# Patient Record
Sex: FEMALE | Race: WHITE | NOT HISPANIC OR LATINO | Employment: FULL TIME | URBAN - METROPOLITAN AREA
[De-identification: names, ages, dates, MRNs, and addresses within clinical notes are randomized per-mention and may not be internally consistent; named-entity substitution may affect disease eponyms.]

---

## 2017-03-17 ENCOUNTER — TRANSCRIBE ORDERS (OUTPATIENT)
Dept: LAB | Facility: HOSPITAL | Age: 32
End: 2017-03-17

## 2017-03-17 ENCOUNTER — APPOINTMENT (OUTPATIENT)
Dept: LAB | Facility: HOSPITAL | Age: 32
End: 2017-03-17
Attending: SPECIALIST

## 2017-03-17 DIAGNOSIS — Z32.00 PREGNANCY EXAMINATION OR TEST, PREGNANCY UNCONFIRMED: Primary | ICD-10-CM

## 2017-03-17 LAB — HCG INTACT+B SERPL-ACNC: 124 MIU/ML

## 2017-03-17 PROCEDURE — 36415 COLL VENOUS BLD VENIPUNCTURE: CPT | Performed by: SPECIALIST

## 2017-03-17 PROCEDURE — 84702 CHORIONIC GONADOTROPIN TEST: CPT | Performed by: SPECIALIST

## 2017-03-24 ENCOUNTER — APPOINTMENT (OUTPATIENT)
Dept: LAB | Facility: HOSPITAL | Age: 32
End: 2017-03-24

## 2017-03-24 ENCOUNTER — TRANSCRIBE ORDERS (OUTPATIENT)
Dept: LAB | Facility: HOSPITAL | Age: 32
End: 2017-03-24

## 2017-03-24 DIAGNOSIS — E07.9 THYROID DISORDER: Primary | ICD-10-CM

## 2017-03-24 LAB — TSH SERPL DL<=0.05 MIU/L-ACNC: 1.14 MIU/ML (ref 0.35–5.35)

## 2017-03-24 PROCEDURE — 36415 COLL VENOUS BLD VENIPUNCTURE: CPT

## 2017-03-24 PROCEDURE — 84443 ASSAY THYROID STIM HORMONE: CPT | Performed by: ORTHOPAEDIC SURGERY

## 2017-04-27 ENCOUNTER — LAB (OUTPATIENT)
Dept: LAB | Facility: HOSPITAL | Age: 32
End: 2017-04-27

## 2017-04-27 DIAGNOSIS — O02.1 MISSED AB: Primary | ICD-10-CM

## 2017-04-27 LAB — HCG INTACT+B SERPL-ACNC: 382 MIU/ML

## 2017-04-27 PROCEDURE — 84702 CHORIONIC GONADOTROPIN TEST: CPT | Performed by: SPECIALIST

## 2017-04-27 PROCEDURE — 36415 COLL VENOUS BLD VENIPUNCTURE: CPT

## 2017-05-19 ENCOUNTER — TRANSCRIBE ORDERS (OUTPATIENT)
Dept: LAB | Facility: HOSPITAL | Age: 32
End: 2017-05-19

## 2017-05-19 ENCOUNTER — LAB (OUTPATIENT)
Dept: LAB | Facility: HOSPITAL | Age: 32
End: 2017-05-19

## 2017-05-19 DIAGNOSIS — O02.1 MISSED AB: Primary | ICD-10-CM

## 2017-05-19 DIAGNOSIS — O02.1 MISSED AB: ICD-10-CM

## 2017-05-19 LAB — HCG INTACT+B SERPL-ACNC: <5 MIU/ML

## 2017-05-19 PROCEDURE — 84702 CHORIONIC GONADOTROPIN TEST: CPT | Performed by: SPECIALIST

## 2017-05-19 PROCEDURE — 36415 COLL VENOUS BLD VENIPUNCTURE: CPT

## 2017-10-16 ENCOUNTER — TRANSCRIBE ORDERS (OUTPATIENT)
Dept: ADMINISTRATIVE | Facility: HOSPITAL | Age: 32
End: 2017-10-16

## 2017-10-16 DIAGNOSIS — Z31.41 FERTILITY TESTING: Primary | ICD-10-CM

## 2017-10-20 ENCOUNTER — HOSPITAL ENCOUNTER (OUTPATIENT)
Dept: GENERAL RADIOLOGY | Facility: HOSPITAL | Age: 32
Discharge: HOME | End: 2017-10-20
Attending: SPECIALIST | Admitting: SPECIALIST

## 2017-10-20 ENCOUNTER — TRANSCRIBE ORDERS (OUTPATIENT)
Dept: LAB | Facility: HOSPITAL | Age: 32
End: 2017-10-20

## 2017-10-20 ENCOUNTER — LAB (OUTPATIENT)
Dept: LAB | Facility: HOSPITAL | Age: 32
End: 2017-10-20

## 2017-10-20 DIAGNOSIS — E07.9 THYROID DISORDER: Primary | ICD-10-CM

## 2017-10-20 DIAGNOSIS — Z31.41 FERTILITY TESTING: ICD-10-CM

## 2017-10-20 DIAGNOSIS — N92.6 IRREGULAR PERIODS: ICD-10-CM

## 2017-10-20 DIAGNOSIS — E07.9 THYROID DISORDER: ICD-10-CM

## 2017-10-20 LAB — TSH SERPL DL<=0.05 MIU/L-ACNC: 0.01 MIU/ML (ref 0.35–5.35)

## 2017-10-20 PROCEDURE — 0 IOPAMIDOL 61 % SOLUTION: Performed by: SPECIALIST

## 2017-10-20 PROCEDURE — 36415 COLL VENOUS BLD VENIPUNCTURE: CPT

## 2017-10-20 PROCEDURE — 82397 CHEMILUMINESCENT ASSAY: CPT | Performed by: SPECIALIST

## 2017-10-20 PROCEDURE — 74740 X-RAY FEMALE GENITAL TRACT: CPT

## 2017-10-20 PROCEDURE — 84443 ASSAY THYROID STIM HORMONE: CPT | Performed by: SPECIALIST

## 2017-10-20 RX ADMIN — IOPAMIDOL 15 ML: 612 INJECTION, SOLUTION INTRAVENOUS at 08:16

## 2017-10-23 LAB — MIS SERPL-MCNC: 3.9 NG/ML

## 2018-08-16 ENCOUNTER — LAB REQUISITION (OUTPATIENT)
Dept: LAB | Facility: HOSPITAL | Age: 33
End: 2018-08-16

## 2018-08-16 DIAGNOSIS — Z00.00 ROUTINE GENERAL MEDICAL EXAMINATION AT A HEALTH CARE FACILITY: ICD-10-CM

## 2018-08-16 LAB — HCG INTACT+B SERPL-ACNC: 16 MIU/ML

## 2018-08-16 PROCEDURE — 84702 CHORIONIC GONADOTROPIN TEST: CPT | Performed by: OBSTETRICS & GYNECOLOGY

## 2018-08-18 ENCOUNTER — LAB REQUISITION (OUTPATIENT)
Dept: LAB | Facility: HOSPITAL | Age: 33
End: 2018-08-18

## 2018-08-18 DIAGNOSIS — Z00.00 ROUTINE GENERAL MEDICAL EXAMINATION AT A HEALTH CARE FACILITY: ICD-10-CM

## 2018-08-18 DIAGNOSIS — Z32.00 ENCOUNTER FOR PREGNANCY TEST: ICD-10-CM

## 2018-08-18 LAB — HCG INTACT+B SERPL-ACNC: 23 MIU/ML

## 2018-08-18 PROCEDURE — 84702 CHORIONIC GONADOTROPIN TEST: CPT

## 2018-08-23 ENCOUNTER — LAB REQUISITION (OUTPATIENT)
Dept: LAB | Facility: HOSPITAL | Age: 33
End: 2018-08-23

## 2018-08-23 DIAGNOSIS — Z00.00 ROUTINE GENERAL MEDICAL EXAMINATION AT A HEALTH CARE FACILITY: ICD-10-CM

## 2018-08-23 LAB — HCG INTACT+B SERPL-ACNC: 136 MIU/ML

## 2018-08-23 PROCEDURE — 84702 CHORIONIC GONADOTROPIN TEST: CPT

## 2018-09-10 ENCOUNTER — LAB REQUISITION (OUTPATIENT)
Dept: LAB | Facility: HOSPITAL | Age: 33
End: 2018-09-10

## 2018-09-10 DIAGNOSIS — Z00.00 ROUTINE GENERAL MEDICAL EXAMINATION AT A HEALTH CARE FACILITY: ICD-10-CM

## 2018-09-10 PROCEDURE — 36415 COLL VENOUS BLD VENIPUNCTURE: CPT | Performed by: NURSE PRACTITIONER

## 2018-09-13 ENCOUNTER — LAB REQUISITION (OUTPATIENT)
Dept: LAB | Facility: HOSPITAL | Age: 33
End: 2018-09-13

## 2018-09-13 DIAGNOSIS — Z00.00 ROUTINE GENERAL MEDICAL EXAMINATION AT A HEALTH CARE FACILITY: ICD-10-CM

## 2018-09-13 LAB
ALBUMIN SERPL-MCNC: 4.31 G/DL (ref 3.2–4.8)
ALBUMIN/GLOB SERPL: 2 G/DL (ref 1.5–2.5)
ALP SERPL-CCNC: 101 U/L (ref 25–100)
ALT SERPL W P-5'-P-CCNC: 108 U/L (ref 7–40)
ANION GAP SERPL CALCULATED.3IONS-SCNC: 6 MMOL/L (ref 3–11)
AST SERPL-CCNC: 51 U/L (ref 0–33)
BILIRUB SERPL-MCNC: 0.4 MG/DL (ref 0.3–1.2)
BUN BLD-MCNC: 6 MG/DL (ref 9–23)
BUN/CREAT SERPL: 8.5 (ref 7–25)
CALCIUM SPEC-SCNC: 10 MG/DL (ref 8.7–10.4)
CHLORIDE SERPL-SCNC: 101 MMOL/L (ref 99–109)
CO2 SERPL-SCNC: 28 MMOL/L (ref 20–31)
CREAT BLD-MCNC: 0.71 MG/DL (ref 0.6–1.3)
GFR SERPL CREATININE-BSD FRML MDRD: 94 ML/MIN/1.73
GLOBULIN UR ELPH-MCNC: 2.2 GM/DL
GLUCOSE BLD-MCNC: 103 MG/DL (ref 70–100)
HCG INTACT+B SERPL-ACNC: 7988 MIU/ML
POTASSIUM BLD-SCNC: 4.4 MMOL/L (ref 3.5–5.5)
PROT SERPL-MCNC: 6.5 G/DL (ref 5.7–8.2)
SODIUM BLD-SCNC: 135 MMOL/L (ref 132–146)

## 2018-09-13 PROCEDURE — 80053 COMPREHEN METABOLIC PANEL: CPT | Performed by: NURSE PRACTITIONER

## 2018-09-13 PROCEDURE — 36415 COLL VENOUS BLD VENIPUNCTURE: CPT | Performed by: NURSE PRACTITIONER

## 2018-09-13 PROCEDURE — 84702 CHORIONIC GONADOTROPIN TEST: CPT | Performed by: NURSE PRACTITIONER

## 2018-09-20 ENCOUNTER — LAB REQUISITION (OUTPATIENT)
Dept: LAB | Facility: HOSPITAL | Age: 33
End: 2018-09-20

## 2018-09-20 DIAGNOSIS — Z00.00 ROUTINE GENERAL MEDICAL EXAMINATION AT A HEALTH CARE FACILITY: ICD-10-CM

## 2018-09-20 PROCEDURE — 36415 COLL VENOUS BLD VENIPUNCTURE: CPT | Performed by: NURSE PRACTITIONER

## 2018-09-27 ENCOUNTER — LAB REQUISITION (OUTPATIENT)
Dept: LAB | Facility: HOSPITAL | Age: 33
End: 2018-09-27

## 2018-09-27 DIAGNOSIS — Z00.00 ROUTINE GENERAL MEDICAL EXAMINATION AT A HEALTH CARE FACILITY: ICD-10-CM

## 2018-09-27 PROCEDURE — 36415 COLL VENOUS BLD VENIPUNCTURE: CPT | Performed by: NURSE PRACTITIONER

## 2018-10-09 ENCOUNTER — LAB REQUISITION (OUTPATIENT)
Dept: LAB | Facility: HOSPITAL | Age: 33
End: 2018-10-09

## 2018-10-09 DIAGNOSIS — Z00.00 ROUTINE GENERAL MEDICAL EXAMINATION AT A HEALTH CARE FACILITY: ICD-10-CM

## 2018-10-09 PROCEDURE — 36415 COLL VENOUS BLD VENIPUNCTURE: CPT | Performed by: NURSE PRACTITIONER

## 2018-10-15 ENCOUNTER — OFFICE VISIT (OUTPATIENT)
Dept: ORTHOPEDIC SURGERY | Facility: CLINIC | Age: 33
End: 2018-10-15

## 2018-10-15 VITALS — WEIGHT: 182.54 LBS | OXYGEN SATURATION: 98 % | HEIGHT: 66 IN | BODY MASS INDEX: 29.34 KG/M2 | HEART RATE: 103 BPM

## 2018-10-15 DIAGNOSIS — M79.672 FOOT PAIN, LEFT: Primary | ICD-10-CM

## 2018-10-15 DIAGNOSIS — S96.911A RIGHT FOOT STRAIN, INITIAL ENCOUNTER: ICD-10-CM

## 2018-10-15 PROCEDURE — 99203 OFFICE O/P NEW LOW 30 MIN: CPT | Performed by: PHYSICIAN ASSISTANT

## 2018-10-15 RX ORDER — LEVOTHYROXINE SODIUM 0.12 MG/1
68.5 TABLET ORAL
COMMUNITY
Start: 2018-09-25 | End: 2021-07-22

## 2018-10-15 RX ORDER — LAMOTRIGINE 100 MG/1
150 TABLET ORAL DAILY
COMMUNITY
Start: 2018-09-25

## 2018-10-15 NOTE — PROGRESS NOTES
[facility] [place] [facility] Note    Subjective     Pain of the Left Foot      HPI    Bianca Mercado is a 33 y.o. female.  Presents to orthopedic clinic for evaluation of left foot pain.  She states onset of pain 8-10 weeks ago.  No history of injury or trauma.  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  Pain is localized to the dorsum of foot that extends from the fourth digit into the dorsal foot.  No redness or warmth but she does note occasional swelling.  She was sick for a period 2 weeks at which time she was offered feet symptoms improved.  When she returned to weightbearing activities pain worsened.  No radiculopathy or paresthesias.  She reports current pain scale 4/10.  Severity the pain moderate.  Quality pain dull and stabbing.  Symptoms are worse with walking, stair climbing.  She states she does feel better when she is in her flip flops.  No reported fever, chills, night sweats or other constitutional symptoms.     Past Medical History:   Diagnosis Date   • Anxiety    • Hypothyroid    • Thyroid disease       Past Surgical History:   Procedure Laterality Date   • CHOLECYSTECTOMY     • KNEE SURGERY Right    • NASAL SEPTUM SURGERY     • NOSE SURGERY     • SHOULDER SURGERY Left       Family History   Problem Relation Age of Onset   • Cancer Mother      Social History     Social History   • Marital status: Single     Spouse name: N/A   • Number of children: N/A   • Years of education: N/A     Occupational History   • Not on file.     Social History Main Topics   • Smoking status: Never Smoker   • Smokeless tobacco: Never Used   • Alcohol use No   • Drug use: No   • Sexual activity: Defer     Other Topics Concern   • Not on file     Social History Narrative   • No narrative on file      No current outpatient prescriptions on file prior to visit.     No current facility-administered medications on file prior to  "visit.       Allergies   Allergen Reactions   • Sulfa Antibiotics Hives        The following portions of the patient's history were reviewed and updated as appropriate: allergies, current medications, past family history, past medical history, past social history, past surgical history and problem list.    Review of Systems   Constitutional: Negative.    HENT: Positive for congestion.    Eyes: Negative.    Respiratory: Negative.    Cardiovascular: Negative.    Gastrointestinal: Negative.    Endocrine: Negative.    Genitourinary: Negative.    Musculoskeletal: Positive for arthralgias.   Skin: Negative.    Allergic/Immunologic: Negative.    Neurological: Negative.    Hematological: Negative.    Psychiatric/Behavioral: Negative.         Objective      Physical Exam  Pulse 103   Ht 167.6 cm (66\")   Wt 82.8 kg (182 lb 8.7 oz)   SpO2 98%   BMI 29.46 kg/m²     Body mass index is 29.46 kg/m².    General  Mental Status - alert  General Appearance - cooperative, well groomed, not in acute distress  Orientation - Oriented X3  Build & Nutrition - well developed and well nourished  Posture - normal posture  Gait - normal gait     Integumentary  Global Assessment  Examination of related systems reveals - no lymphadenopathy  Ears:  No abnormality  Nose:  No mucous drainage  General Characteristics  Overall examination of the patient's skin reveals - no rashes, no evidence of scars, no suspicious lesions and no bruises.  Color - normal coloration of skin.  Vascular: Brisk capillary refill in all extremities    Ortho Exam  V:  Dorsalis Pedis:  Right: 2+; Left:2+    Posterior Tibial: Right:2+; Left:2+    Capillary Refill:  Brisk  MSK:  Hand:right handed      Tibia:  Right:  non tender and normal motor function; Left:  non tender and normal motor function      Ankle:  Right: non tender, ROM  normal and motor function  normal; Left:  non tender, ROM  normal and motor function  normal      Foot:  Right:  non tender, ROM  normal and " motor function  normal; Left:  tender from fourth and third digits extending proximally along the dorsum foot, ROM  normal and motor function  normal and Positive pain with resisted lesser toe extension.  No evidence of Ferguson's neuroma.      NEURO: Heel Walking:  Right:  normal; Left:  Painful but able to perform    Toe Walking:  Right:  normal; Left:  Painful but able to perform     [place]-Stanislaw 5.07 monofilament test: normal    Lower extremity sensation: intact     Calf Atrophy:none    Motor Function: all 5/5        Imaging/Studies  Imaging Results (last 24 hours)     Procedure Component Value Units Date/Time    XR Foot 3+ View Left [[id number]] Resulted:  10/15/18 1321     Updated:  10/15/18 1321    Narrative:       Left Foot Radiographs  Indication: left foot pain  Views: Weight-bearing AP and lateral, with oblique views of the left foot    Comparison: no prior studies available for review    Findings:  No acute or chronic bony abnormalities seen, with normal alignment.      Ordered 3 views of left foot.  Reviewed by Dr. Berry.  No acute bony abnormality fracture or dislocation.  Joint spaces are preserved.  See chart for official report.    Assessment:  1. Foot pain, left    2. Right foot strain, initial encounter        Plan:  1. Discussion was had with patient regarding exam, imaging, assessment and treatment options.    2. At this time appears as is more of a muscular strain however cannot rule out stress fracture.    3. Patient placed in a boot to give support and stability.    4. MRI will be ordered to rule out stress fracture versus inflammation/strain to the tendons versus other pathology.    5. Recommend over-the-counter medication as needed for discomfort.  6. Follow-up 3 weeks or after MRI completed to review results.  7. Question and concerns answered.    Patient was examined by Dr. Berry and he agrees with the above assessment and plan.      Medical Decision Making  Management Options :  over-the-counter medicine  Data/Risk: radiology tests    Nani Boothe PA-C  10/16/18  8:38 AM

## 2018-10-16 ENCOUNTER — LAB REQUISITION (OUTPATIENT)
Dept: LAB | Facility: HOSPITAL | Age: 33
End: 2018-10-16

## 2018-10-16 DIAGNOSIS — Z00.00 ROUTINE GENERAL MEDICAL EXAMINATION AT A HEALTH CARE FACILITY: ICD-10-CM

## 2018-10-16 PROCEDURE — 36415 COLL VENOUS BLD VENIPUNCTURE: CPT | Performed by: NURSE PRACTITIONER

## 2018-10-25 ENCOUNTER — LAB REQUISITION (OUTPATIENT)
Dept: LAB | Facility: HOSPITAL | Age: 33
End: 2018-10-25

## 2018-10-25 ENCOUNTER — HOSPITAL ENCOUNTER (OUTPATIENT)
Dept: MRI IMAGING | Facility: HOSPITAL | Age: 33
Discharge: HOME | End: 2018-10-25
Admitting: PHYSICIAN ASSISTANT

## 2018-10-25 DIAGNOSIS — Z00.00 ROUTINE GENERAL MEDICAL EXAMINATION AT A HEALTH CARE FACILITY: ICD-10-CM

## 2018-10-25 DIAGNOSIS — M79.672 FOOT PAIN, LEFT: ICD-10-CM

## 2018-10-25 DIAGNOSIS — S96.911A RIGHT FOOT STRAIN, INITIAL ENCOUNTER: ICD-10-CM

## 2018-10-25 PROCEDURE — 36415 COLL VENOUS BLD VENIPUNCTURE: CPT | Performed by: NURSE PRACTITIONER

## 2018-10-25 PROCEDURE — 73718 MRI LOWER EXTREMITY W/O DYE: CPT

## 2018-11-01 ENCOUNTER — LAB REQUISITION (OUTPATIENT)
Dept: LAB | Facility: HOSPITAL | Age: 33
End: 2018-11-01

## 2018-11-01 DIAGNOSIS — Z00.00 ROUTINE GENERAL MEDICAL EXAMINATION AT A HEALTH CARE FACILITY: ICD-10-CM

## 2018-11-01 PROCEDURE — 36415 COLL VENOUS BLD VENIPUNCTURE: CPT | Performed by: NURSE PRACTITIONER

## 2018-11-05 ENCOUNTER — OFFICE VISIT (OUTPATIENT)
Dept: ORTHOPEDIC SURGERY | Facility: CLINIC | Age: 33
End: 2018-11-05

## 2018-11-05 VITALS — HEIGHT: 66 IN | BODY MASS INDEX: 29.34 KG/M2 | HEART RATE: 110 BPM | WEIGHT: 182.54 LBS | OXYGEN SATURATION: 98 %

## 2018-11-05 DIAGNOSIS — M77.8 EXTENSOR TENDONITIS OF FOOT: ICD-10-CM

## 2018-11-05 DIAGNOSIS — M79.672 FOOT PAIN, LEFT: Primary | ICD-10-CM

## 2018-11-05 PROCEDURE — 99213 OFFICE O/P EST LOW 20 MIN: CPT | Performed by: PHYSICIAN ASSISTANT

## 2018-11-05 NOTE — PROGRESS NOTES
"    [facility] [place] Orthopa[facility] Surgery Clinic Note    Subjective     CC: Follow-up of the Left Foot (After MRI 10/25/2018)      ELADIO Mercado is a 33 y.o. female.  A she returns today for MRI results of her left foot.  No significant change since last visit.  She has been wearing the cam boot.  She still notes pain scale 2/10.  Severity the pain mild to moderate.  Quality the pain dull.  Symptoms are worse with walking, prolonged standing, stair climbing or at work where she is standing on concrete.  No reported radiculopathy or paresthesias.  Denies fever, chills, night sweats or other constitutional symptoms.       ROS:    Constiutional:Pt denies fever, chills, nausea, or vomiting.  MSK:as above    Objective      Past Medical History  Past Medical History:   Diagnosis Date   • Anxiety    • Hypothyroid    • Thyroid disease          Physical Exam  Pulse 110   Ht 167.6 cm (65.98\")   Wt 82.8 kg (182 lb 8.7 oz)   LMP  (LMP Unknown)   SpO2 98%   BMI 29.48 kg/m²     Body mass index is 29.48 kg/m².    Patient is well nourished and well developed.        Ortho Exam  V:                     Dorsalis Pedis:  Right: 2+; Left:2+                          Posterior Tibial: Right:2+; Left:2+                          Capillary Refill:  Brisk  MSK:               Hand:right handed                             Tibia:  Right:  non tender and normal motor function; Left:  non tender and normal motor function                             Ankle:  Right: non tender, ROM  normal and motor function  normal; Left:  non tender, ROM  normal and motor function  normal                             Foot:  Right:  non tender, ROM  normal and motor function  normal; Left:  tender along dorsum foot over third MT , ROM  normal and motor function  normal and Positive pain with resisted lesser toe extension.                              NEURO:         Heel Walking:  not attempted                          Toe Walking:  not attempted                  "                 [place]-Stanislaw 5.07 monofilament test: normal                          Lower extremity sensation: intact                           Calf Atrophy:none                          Motor Function: all 5/5       Imaging/Labs/EMG Reviewed:  Imaging Results (last 24 hours)     ** No results found for the last 24 hours. **      Reviewed noncontrast MRI performed on the left foot 10/25/18.  No evidence of stress fracture or marrow edema.  Evidence of tendinitis noted along third extensor tendon at level of the proximal third metatarsal which does correspond where the patient is tender.  See chart for official report.    Assessment:  1. Foot pain, left    2. Extensor tendonitis of foot        Plan:  1. Discussion was had with patient regarding exam, imaging, assessment and treatment options.    2. Continue use of CAM boot for another 3-4 weeks and then gradually transition into regular shoes.  3. Offered to send the patient physical therapy but she declined stating she's been in the past and knows which exercises to be performing.    4. Recommend over-the-counter medication as needed for discomfort.  5. Follow-up 6 weeks for repeat evaluation, sooner if issues arise or symptoms worsen/change.  6. Question and concerns answered.    Case was discussed with Dr. Berry who agreed with the above assessment and plan.      Medical Decision Making  Management Options : over-the-counter medicine  Data/Risk: radiology tests    Nani Boothe PA-C  11/06/18  10:28 AM

## 2018-11-06 ENCOUNTER — LAB REQUISITION (OUTPATIENT)
Dept: LAB | Facility: HOSPITAL | Age: 33
End: 2018-11-06

## 2018-11-06 DIAGNOSIS — Z00.00 ROUTINE GENERAL MEDICAL EXAMINATION AT A HEALTH CARE FACILITY: ICD-10-CM

## 2018-11-06 PROCEDURE — 36415 COLL VENOUS BLD VENIPUNCTURE: CPT | Performed by: NURSE PRACTITIONER

## 2018-11-21 ENCOUNTER — LAB REQUISITION (OUTPATIENT)
Dept: LAB | Facility: HOSPITAL | Age: 33
End: 2018-11-21

## 2018-11-21 DIAGNOSIS — Z00.00 ROUTINE GENERAL MEDICAL EXAMINATION AT A HEALTH CARE FACILITY: ICD-10-CM

## 2018-11-21 PROCEDURE — 36415 COLL VENOUS BLD VENIPUNCTURE: CPT | Performed by: NURSE PRACTITIONER

## 2018-12-05 ENCOUNTER — LAB REQUISITION (OUTPATIENT)
Dept: LAB | Facility: HOSPITAL | Age: 33
End: 2018-12-05

## 2018-12-05 DIAGNOSIS — Z00.00 ROUTINE GENERAL MEDICAL EXAMINATION AT A HEALTH CARE FACILITY: ICD-10-CM

## 2018-12-05 PROCEDURE — 36415 COLL VENOUS BLD VENIPUNCTURE: CPT | Performed by: NURSE PRACTITIONER

## 2018-12-17 ENCOUNTER — OFFICE VISIT (OUTPATIENT)
Dept: ORTHOPEDIC SURGERY | Facility: CLINIC | Age: 33
End: 2018-12-17

## 2018-12-17 VITALS — OXYGEN SATURATION: 99 % | BODY MASS INDEX: 30.22 KG/M2 | HEIGHT: 66 IN | HEART RATE: 106 BPM | WEIGHT: 188.05 LBS

## 2018-12-17 DIAGNOSIS — M77.8 EXTENSOR TENDONITIS OF FOOT: ICD-10-CM

## 2018-12-17 DIAGNOSIS — M79.672 FOOT PAIN, LEFT: Primary | ICD-10-CM

## 2018-12-17 PROCEDURE — 99212 OFFICE O/P EST SF 10 MIN: CPT | Performed by: PHYSICIAN ASSISTANT

## 2018-12-17 RX ORDER — OXCARBAZEPINE 300 MG/1
450 TABLET, FILM COATED ORAL NIGHTLY
COMMUNITY
Start: 2018-11-28

## 2018-12-17 RX ORDER — CLONAZEPAM 0.5 MG/1
0.5 TABLET ORAL 2 TIMES DAILY PRN
COMMUNITY
Start: 2018-11-06

## 2018-12-17 NOTE — PROGRESS NOTES
"    [facility] [place] Orthopaedic Surgery Clinic Note    Subjective     CC: Follow-up of the Left Foot (Extensor Tendonitis of Foot /6 week f/u)      HPI    Bianca Mercado is a 33 y.o. female.  Patient returns today for follow-up evaluation of her left foot.  She reports symptoms have improved.  She currently endorses 2/10 pain.  Severity the pain mild.  Quality pain dull.  Pains worse with walking, stair climbing and at work.  If she is at home doing chores she doesn't have any pain, but at work standing all day on concrete that's when she notices the pain along the dorsum of foot.  No reported radiculopathy or paresthesias.       ROS:    Constiutional:Pt denies fever, chills, nausea, or vomiting.  MSK:as above    Objective      Past Medical History  Past Medical History:   Diagnosis Date   • Anxiety    • Hypothyroid    • Thyroid disease          Physical Exam  Pulse 106   Ht 167.6 cm (65.98\")   Wt 85.3 kg (188 lb 0.8 oz)   SpO2 99%   BMI 30.37 kg/m²     Body mass index is 30.37 kg/m².    Patient is well nourished and well developed.        Ortho Exam  V:  Dorsalis Pedis:  Left:2+    Posterior Tibial: Left:2+    Capillary Refill:  Brisk  MSK:  Tibia:  Left:  non tender and normal motor function      Ankle:  Left:  non tender, ROM  normal and motor function  normal      Foot:  Left:  tender Positive dorsum of foot and along the second through fourth metatarsals, ROM  normal and motor function  normal      NEURO: Heel Walking:  Right:  normal; Left:  normal    Toe Walking:  Right:  normal; Left:  normal     [place]-Stanislaw 5.07 monofilament test: not evaluated    Lower extremity sensation: intact     Calf Atrophy:none    Motor Function: all 5/5        Imaging/Labs/EMG Reviewed:    Imaging Results (last 24 hours)     ** No results found for the last 24 hours. **      None today.    Assessment:  1. Foot pain, left    2. Extensor tendonitis of foot        Plan:  1. Discussion was had with patient regarding exam, assessment " and treatment options for her right foot pain with known extensor tendinitis.  2. Because she is starting [place] break, she knows she'll have a couple weeks off to continue to allow the foot rest.    3. We discussed the use of custom-made orthotics but because of the price she doesn't believe she'll be able to proceed with this treatment course.  Instead she'll try over-the-counter orthotics.  Regardless a prescription for custom-made orthotics was given to the patient today.  4. After she returns to work in the new year, if she has a return of the pain she'll follow back up for casting to allow complete and total rest.  Patient is amenable to this treatment plan and we'll therefore follow up after the new year as needed.       5. I did once again offered to send the patient physical therapy but she declined.    6. Recommend over-the-counter medication as needed for discomfort.  7. Follow-up as needed.  8. Question and concerns answered.    Case was discussed with Dr. Berry who agreed with the above assessment and plan.    Medical Decision Making  Management Options : over-the-counter medicine      Nani Boothe PA-C  12/18/18  8:21 AM

## 2019-02-28 ENCOUNTER — TRANSCRIBE ORDERS (OUTPATIENT)
Dept: LAB | Facility: HOSPITAL | Age: 34
End: 2019-02-28

## 2019-02-28 ENCOUNTER — LAB (OUTPATIENT)
Dept: LAB | Facility: HOSPITAL | Age: 34
End: 2019-02-28

## 2019-02-28 DIAGNOSIS — Z32.00 PREGNANCY EXAMINATION OR TEST, PREGNANCY UNCONFIRMED: ICD-10-CM

## 2019-02-28 DIAGNOSIS — Z32.00 PREGNANCY EXAMINATION OR TEST, PREGNANCY UNCONFIRMED: Primary | ICD-10-CM

## 2019-02-28 LAB — HCG INTACT+B SERPL-ACNC: <5 MIU/ML

## 2019-02-28 PROCEDURE — 84702 CHORIONIC GONADOTROPIN TEST: CPT

## 2019-02-28 PROCEDURE — 36415 COLL VENOUS BLD VENIPUNCTURE: CPT

## 2019-03-25 ENCOUNTER — LAB REQUISITION (OUTPATIENT)
Dept: LAB | Facility: HOSPITAL | Age: 34
End: 2019-03-25

## 2019-03-25 DIAGNOSIS — Z00.00 ROUTINE GENERAL MEDICAL EXAMINATION AT A HEALTH CARE FACILITY: ICD-10-CM

## 2019-03-25 LAB — HCG INTACT+B SERPL-ACNC: 184 MIU/ML

## 2019-03-25 PROCEDURE — 84702 CHORIONIC GONADOTROPIN TEST: CPT

## 2019-03-28 ENCOUNTER — LAB REQUISITION (OUTPATIENT)
Dept: LAB | Facility: HOSPITAL | Age: 34
End: 2019-03-28

## 2019-03-28 DIAGNOSIS — Z00.00 ROUTINE GENERAL MEDICAL EXAMINATION AT A HEALTH CARE FACILITY: ICD-10-CM

## 2019-03-28 LAB — HCG INTACT+B SERPL-ACNC: 724 MIU/ML

## 2019-03-28 PROCEDURE — 84702 CHORIONIC GONADOTROPIN TEST: CPT | Performed by: OBSTETRICS & GYNECOLOGY

## 2019-05-22 ENCOUNTER — LAB REQUISITION (OUTPATIENT)
Dept: LAB | Facility: HOSPITAL | Age: 34
End: 2019-05-22

## 2019-05-22 DIAGNOSIS — Z00.00 ROUTINE GENERAL MEDICAL EXAMINATION AT A HEALTH CARE FACILITY: ICD-10-CM

## 2019-05-22 PROCEDURE — 36415 COLL VENOUS BLD VENIPUNCTURE: CPT | Performed by: OBSTETRICS & GYNECOLOGY

## 2019-05-22 PROCEDURE — 36415 COLL VENOUS BLD VENIPUNCTURE: CPT

## 2019-06-12 ENCOUNTER — TRANSCRIBE ORDERS (OUTPATIENT)
Dept: WOMENS IMAGING | Facility: HOSPITAL | Age: 34
End: 2019-06-12

## 2019-06-12 DIAGNOSIS — O09.522 AMA (ADVANCED MATERNAL AGE) MULTIGRAVIDA 35+, SECOND TRIMESTER: Primary | ICD-10-CM

## 2019-07-03 ENCOUNTER — OFFICE VISIT (OUTPATIENT)
Dept: OBSTETRICS AND GYNECOLOGY | Facility: HOSPITAL | Age: 34
End: 2019-07-03

## 2019-07-03 ENCOUNTER — HOSPITAL ENCOUNTER (OUTPATIENT)
Dept: WOMENS IMAGING | Facility: HOSPITAL | Age: 34
Discharge: HOME | End: 2019-07-03
Admitting: OBSTETRICS & GYNECOLOGY

## 2019-07-03 VITALS
DIASTOLIC BLOOD PRESSURE: 78 MMHG | WEIGHT: 198 LBS | SYSTOLIC BLOOD PRESSURE: 123 MMHG | BODY MASS INDEX: 31.82 KG/M2 | HEIGHT: 66 IN

## 2019-07-03 DIAGNOSIS — O44.20 MARGINAL PLACENTA PREVIA: ICD-10-CM

## 2019-07-03 DIAGNOSIS — O09.512 ELDERLY PRIMIGRAVIDA IN SECOND TRIMESTER: Primary | ICD-10-CM

## 2019-07-03 DIAGNOSIS — O09.522 AMA (ADVANCED MATERNAL AGE) MULTIGRAVIDA 35+, SECOND TRIMESTER: ICD-10-CM

## 2019-07-03 DIAGNOSIS — Z34.90 PREGNANCY, UNSPECIFIED GESTATIONAL AGE: ICD-10-CM

## 2019-07-03 DIAGNOSIS — O35.BXX0 ECHOGENIC FOCUS OF HEART OF FETUS AFFECTING ANTEPARTUM CARE OF MOTHER, SINGLE OR UNSPECIFIED FETUS: ICD-10-CM

## 2019-07-03 PROCEDURE — 76811 OB US DETAILED SNGL FETUS: CPT | Performed by: OBSTETRICS & GYNECOLOGY

## 2019-07-03 PROCEDURE — 76811 OB US DETAILED SNGL FETUS: CPT

## 2019-07-03 RX ORDER — PRENATAL WITH FERROUS FUM AND FOLIC ACID 3080; 920; 120; 400; 22; 1.84; 3; 20; 10; 1; 12; 200; 27; 25; 2 [IU]/1; [IU]/1; MG/1; [IU]/1; MG/1; MG/1; MG/1; MG/1; MG/1; MG/1; UG/1; MG/1; MG/1; MG/1; MG/1
TABLET ORAL DAILY
COMMUNITY
End: 2021-07-22

## 2019-07-03 RX ORDER — LEVOMEFOLATE CALCIUM 15 MG
5 TABLET ORAL 3 TIMES WEEKLY
COMMUNITY
End: 2019-10-11 | Stop reason: HOSPADM

## 2019-07-03 NOTE — PROGRESS NOTES
Documentation of the ultasound findings, images, and interpretations with be available in the patient's Viewpoint report located in the Chart Review Imaging tab in [facility].

## 2019-07-03 NOTE — PROGRESS NOTES
"Pt denies problems with pregnancy.  Next OB appt 7/17/19.  Pt reports \"DS test was normal and a girl.\"  "

## 2019-08-09 ENCOUNTER — LAB REQUISITION (OUTPATIENT)
Dept: LAB | Facility: HOSPITAL | Age: 34
End: 2019-08-09

## 2019-08-09 DIAGNOSIS — Z00.00 ROUTINE GENERAL MEDICAL EXAMINATION AT A HEALTH CARE FACILITY: ICD-10-CM

## 2019-08-09 PROCEDURE — 36415 COLL VENOUS BLD VENIPUNCTURE: CPT | Performed by: NURSE PRACTITIONER

## 2019-09-09 ENCOUNTER — HOSPITAL ENCOUNTER (INPATIENT)
Facility: HOSPITAL | Age: 34
LOS: 3 days | Discharge: HOME | End: 2019-09-12
Attending: OBSTETRICS & GYNECOLOGY | Admitting: OBSTETRICS & GYNECOLOGY

## 2019-09-09 ENCOUNTER — PREP FOR SURGERY (OUTPATIENT)
Dept: OTHER | Facility: HOSPITAL | Age: 34
End: 2019-09-09

## 2019-09-09 ENCOUNTER — DOCUMENTATION (OUTPATIENT)
Dept: OBSTETRICS AND GYNECOLOGY | Facility: HOSPITAL | Age: 34
End: 2019-09-09

## 2019-09-09 DIAGNOSIS — Z3A.28 28 WEEKS GESTATION OF PREGNANCY: ICD-10-CM

## 2019-09-09 DIAGNOSIS — Z3A.28 28 WEEKS GESTATION OF PREGNANCY: Primary | ICD-10-CM

## 2019-09-09 PROBLEM — O14.90 PREECLAMPSIA: Status: ACTIVE | Noted: 2019-09-09

## 2019-09-09 LAB
ABO GROUP BLD: NORMAL
ABO GROUP BLD: NORMAL
ALP SERPL-CCNC: 122 U/L (ref 39–117)
ALT SERPL W P-5'-P-CCNC: 15 U/L (ref 1–33)
AMPHET+METHAMPHET UR QL: NEGATIVE
AMPHETAMINES UR QL: NEGATIVE
ANTI-D, PASSIVE: NORMAL
AST SERPL-CCNC: 17 U/L (ref 1–32)
BARBITURATES UR QL SCN: NEGATIVE
BENZODIAZ UR QL SCN: NEGATIVE
BILIRUB SERPL-MCNC: <0.2 MG/DL (ref 0.2–1.2)
BLD GP AB SCN SERPL QL: POSITIVE
BLD GP AB SCN SERPL QL: POSITIVE
BUPRENORPHINE SERPL-MCNC: NEGATIVE NG/ML
CANNABINOIDS SERPL QL: NEGATIVE
COCAINE UR QL: NEGATIVE
CREAT BLD-MCNC: 0.57 MG/DL (ref 0.57–1)
DEPRECATED RDW RBC AUTO: 43.5 FL (ref 37–54)
ERYTHROCYTE [DISTWIDTH] IN BLOOD BY AUTOMATED COUNT: 13.4 % (ref 12.3–15.4)
FETAL BLEED: NEGATIVE
GLUCOSE 1H P 100 G GLC PO SERPL-MCNC: 150 MG/DL (ref 74–180)
HCT VFR BLD AUTO: 34.7 % (ref 34–46.6)
HGB BLD-MCNC: 11.5 G/DL (ref 12–15.9)
LDH SERPL-CCNC: 160 U/L (ref 135–214)
MCH RBC QN AUTO: 29.6 PG (ref 26.6–33)
MCHC RBC AUTO-ENTMCNC: 33.1 G/DL (ref 31.5–35.7)
MCV RBC AUTO: 89.2 FL (ref 79–97)
METHADONE UR QL SCN: NEGATIVE
NUMBER OF DOSES: NORMAL
OPIATES UR QL: NEGATIVE
OXYCODONE UR QL SCN: NEGATIVE
PCP UR QL SCN: NEGATIVE
PLATELET # BLD AUTO: 256 10*3/MM3 (ref 140–450)
PMV BLD AUTO: 11.8 FL (ref 6–12)
PROPOXYPH UR QL: NEGATIVE
RBC # BLD AUTO: 3.89 10*6/MM3 (ref 3.77–5.28)
RH BLD: NEGATIVE
RH BLD: NEGATIVE
T&S EXPIRATION DATE: NORMAL
TRICYCLICS UR QL SCN: NEGATIVE
URATE SERPL-MCNC: 4.9 MG/DL (ref 2.4–5.7)
WBC NRBC COR # BLD: 14.87 10*3/MM3 (ref 3.4–10.8)

## 2019-09-09 PROCEDURE — 85461 HEMOGLOBIN FETAL: CPT | Performed by: OBSTETRICS & GYNECOLOGY

## 2019-09-09 PROCEDURE — 84075 ASSAY ALKALINE PHOSPHATASE: CPT | Performed by: NURSE PRACTITIONER

## 2019-09-09 PROCEDURE — 82247 BILIRUBIN TOTAL: CPT | Performed by: NURSE PRACTITIONER

## 2019-09-09 PROCEDURE — 86901 BLOOD TYPING SEROLOGIC RH(D): CPT | Performed by: NURSE PRACTITIONER

## 2019-09-09 PROCEDURE — 84450 TRANSFERASE (AST) (SGOT): CPT | Performed by: NURSE PRACTITIONER

## 2019-09-09 PROCEDURE — 59025 FETAL NON-STRESS TEST: CPT

## 2019-09-09 PROCEDURE — 84443 ASSAY THYROID STIM HORMONE: CPT | Performed by: OBSTETRICS & GYNECOLOGY

## 2019-09-09 PROCEDURE — 86870 RBC ANTIBODY IDENTIFICATION: CPT | Performed by: OBSTETRICS & GYNECOLOGY

## 2019-09-09 PROCEDURE — 85027 COMPLETE CBC AUTOMATED: CPT | Performed by: NURSE PRACTITIONER

## 2019-09-09 PROCEDURE — 83615 LACTATE (LD) (LDH) ENZYME: CPT | Performed by: NURSE PRACTITIONER

## 2019-09-09 PROCEDURE — 80306 DRUG TEST PRSMV INSTRMNT: CPT | Performed by: NURSE PRACTITIONER

## 2019-09-09 PROCEDURE — 25010000002 BETAMETHASONE ACET & SOD PHOS PER 4 MG: Performed by: NURSE PRACTITIONER

## 2019-09-09 PROCEDURE — 84460 ALANINE AMINO (ALT) (SGPT): CPT | Performed by: NURSE PRACTITIONER

## 2019-09-09 PROCEDURE — 84550 ASSAY OF BLOOD/URIC ACID: CPT | Performed by: NURSE PRACTITIONER

## 2019-09-09 PROCEDURE — 86900 BLOOD TYPING SEROLOGIC ABO: CPT | Performed by: NURSE PRACTITIONER

## 2019-09-09 PROCEDURE — 86850 RBC ANTIBODY SCREEN: CPT | Performed by: NURSE PRACTITIONER

## 2019-09-09 PROCEDURE — 82565 ASSAY OF CREATININE: CPT | Performed by: NURSE PRACTITIONER

## 2019-09-09 RX ORDER — ACETAMINOPHEN 325 MG/1
650 TABLET ORAL EVERY 4 HOURS PRN
Status: DISCONTINUED | OUTPATIENT
Start: 2019-09-09 | End: 2019-09-12 | Stop reason: HOSPADM

## 2019-09-09 RX ORDER — SODIUM CHLORIDE 0.9 % (FLUSH) 0.9 %
3 SYRINGE (ML) INJECTION EVERY 12 HOURS SCHEDULED
Status: CANCELLED | OUTPATIENT
Start: 2019-09-09

## 2019-09-09 RX ORDER — MAGNESIUM SULFATE HEPTAHYDRATE 40 MG/ML
4 INJECTION, SOLUTION INTRAVENOUS ONCE
Status: CANCELLED | OUTPATIENT
Start: 2019-09-09

## 2019-09-09 RX ORDER — SODIUM CHLORIDE 0.9 % (FLUSH) 0.9 %
3 SYRINGE (ML) INJECTION EVERY 12 HOURS SCHEDULED
Status: DISCONTINUED | OUTPATIENT
Start: 2019-09-09 | End: 2019-09-12 | Stop reason: HOSPADM

## 2019-09-09 RX ORDER — MAGNESIUM SULFATE HEPTAHYDRATE 40 MG/ML
2 INJECTION, SOLUTION INTRAVENOUS
Status: CANCELLED | OUTPATIENT
Start: 2019-09-09 | End: 2019-09-11

## 2019-09-09 RX ORDER — LEVOTHYROXINE SODIUM 0.12 MG/1
62.5 TABLET ORAL
Status: DISCONTINUED | OUTPATIENT
Start: 2019-09-10 | End: 2019-09-12 | Stop reason: HOSPADM

## 2019-09-09 RX ORDER — SODIUM CHLORIDE 0.9 % (FLUSH) 0.9 %
3-10 SYRINGE (ML) INJECTION AS NEEDED
Status: DISCONTINUED | OUTPATIENT
Start: 2019-09-09 | End: 2019-09-12 | Stop reason: HOSPADM

## 2019-09-09 RX ORDER — ONDANSETRON 4 MG/1
4 TABLET, FILM COATED ORAL EVERY 8 HOURS PRN
Status: DISCONTINUED | OUTPATIENT
Start: 2019-09-09 | End: 2019-09-12 | Stop reason: HOSPADM

## 2019-09-09 RX ORDER — CALCIUM CARBONATE 200(500)MG
1 TABLET,CHEWABLE ORAL 3 TIMES DAILY PRN
Status: DISCONTINUED | OUTPATIENT
Start: 2019-09-09 | End: 2019-09-12 | Stop reason: HOSPADM

## 2019-09-09 RX ORDER — BETAMETHASONE SODIUM PHOSPHATE AND BETAMETHASONE ACETATE 3; 3 MG/ML; MG/ML
12 INJECTION, SUSPENSION INTRA-ARTICULAR; INTRALESIONAL; INTRAMUSCULAR; SOFT TISSUE EVERY 24 HOURS
Status: COMPLETED | OUTPATIENT
Start: 2019-09-09 | End: 2019-09-10

## 2019-09-09 RX ORDER — ONDANSETRON 4 MG/1
4 TABLET, FILM COATED ORAL EVERY 8 HOURS PRN
Status: CANCELLED | OUTPATIENT
Start: 2019-09-09

## 2019-09-09 RX ORDER — BETAMETHASONE SODIUM PHOSPHATE AND BETAMETHASONE ACETATE 3; 3 MG/ML; MG/ML
12 INJECTION, SUSPENSION INTRA-ARTICULAR; INTRALESIONAL; INTRAMUSCULAR; SOFT TISSUE EVERY 24 HOURS
Status: CANCELLED | OUTPATIENT
Start: 2019-09-09 | End: 2019-09-11

## 2019-09-09 RX ORDER — OXCARBAZEPINE 150 MG/1
300 TABLET, FILM COATED ORAL NIGHTLY
Status: DISCONTINUED | OUTPATIENT
Start: 2019-09-10 | End: 2019-09-12 | Stop reason: HOSPADM

## 2019-09-09 RX ORDER — SODIUM CHLORIDE 0.9 % (FLUSH) 0.9 %
3-10 SYRINGE (ML) INJECTION AS NEEDED
Status: CANCELLED | OUTPATIENT
Start: 2019-09-09

## 2019-09-09 RX ORDER — MAGNESIUM SULFATE HEPTAHYDRATE 40 MG/ML
2 INJECTION, SOLUTION INTRAVENOUS CONTINUOUS
Status: DISCONTINUED | OUTPATIENT
Start: 2019-09-09 | End: 2019-09-11

## 2019-09-09 RX ORDER — MAGNESIUM SULFATE HEPTAHYDRATE 40 MG/ML
2 INJECTION, SOLUTION INTRAVENOUS
Status: DISCONTINUED | OUTPATIENT
Start: 2019-09-09 | End: 2019-09-09

## 2019-09-09 RX ORDER — MAGNESIUM SULFATE HEPTAHYDRATE 40 MG/ML
4 INJECTION, SOLUTION INTRAVENOUS ONCE
Status: DISCONTINUED | OUTPATIENT
Start: 2019-09-09 | End: 2019-09-09

## 2019-09-09 RX ORDER — CALCIUM CARBONATE 200(500)MG
1 TABLET,CHEWABLE ORAL 3 TIMES DAILY PRN
Status: CANCELLED | OUTPATIENT
Start: 2019-09-09

## 2019-09-09 RX ORDER — DEXTROSE AND SODIUM CHLORIDE 5; .2 G/100ML; G/100ML
96 INJECTION, SOLUTION INTRAVENOUS CONTINUOUS
Status: DISCONTINUED | OUTPATIENT
Start: 2019-09-09 | End: 2019-09-11

## 2019-09-09 RX ORDER — ACETAMINOPHEN 325 MG/1
650 TABLET ORAL EVERY 4 HOURS PRN
Status: CANCELLED | OUTPATIENT
Start: 2019-09-09

## 2019-09-09 RX ADMIN — BETAMETHASONE SODIUM PHOSPHATE AND BETAMETHASONE ACETATE 12 MG: 3; 3 INJECTION, SUSPENSION INTRA-ARTICULAR; INTRALESIONAL; INTRAMUSCULAR at 20:41

## 2019-09-09 NOTE — PROGRESS NOTES
Obstetric History and Physical    No chief complaint on file.      Subjective     Patient is a 34 y.o. female  currently at 28w1d, who presents with nausea, fatigue, and elevated BP at work (160's/90's). In office /98, 158/90. She denies current HA, vision changes, epigastric pain. Has intermittent HA and seeing spots. 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  Her previous obstetric/gynecological history is noted for is remarkable for SAB x 2.    The following portions of the patients history were reviewed and updated as appropriate: past medical history, past surgical history, past family history and past social history .       Prenatal Information:  Prenatal Results     Initial Prenatal Labs     Test Value Reference Range Date Time    Hemoglobin        Hematocrit        Platelets 351 10*3/mm3 150 - 450 10*3/mm3 16 1515    Rubella IgG 79.5 IU/mL IU/mL 16 1515      Immune  Immune 16 1515    Hepatitis B SAg Negative  Negative 16 1515    Hepatitis C Ab        RPR        ABO O   16 1515    Rh Negative   16 1515    Antibody Screen        HIV        Urine Culture        Gonorrhea        Chlamydia Negative  Negative 16 1515    TSH 0.006 mIU/mL 0.350 - 5.350 mIU/mL 10/20/17 0834          2nd and 3rd Trimester     Test Value Reference Range Date Time    Hemoglobin (repeated)        Hematocrit (repeated)        GCT        Antibody Screen (repeated)        GTT Fasting        GTT 1 Hr        GTT 2 Hr        GTT 3 Hr        Group B Strep              Drug Screening     Test Value Reference Range Date Time    Amphetamine Screen        Barbiturate Screen        Benzodiazepine Screen        Methadone Screen        Phencyclidine Screen        Opiates Screen        THC Screen        Cocaine Screen        Propoxyphene Screen        Buprenorphine Screen        Methamphetamine Screen        Oxycodone Screen        Tricyclic  Antidepressants Screen              Other (Risk screening)     Test Value Reference Range Date Time    Varicella IgG        Parvovirus IgG        CMV IgG        Cystic Fibrosis        Hemoglobin electrophoresis        NIPT        MSAFP-4        AFP (for NTD only)                  External Prenatal Results     Pregnancy Outside Results - Transcribed From Office Records - See Scanned Records For Details     Test Value Date Time    Hgb 12.7 g/dL 16 1515    Hct 38.6 % 16 1515    ABO O  16 1515    Rh Negative  16 1515    Antibody Screen Negative  16 1515    Glucose Fasting GTT       Glucose Tolerance Test 1 hour       Glucose Tolerance Test 3 hour       Gonorrhea (discrete)       Chlamydia (discrete) Negative  16 1515    RPR       VDRL       Syphilis Antibody       Rubella 79.5 IU/mL 16 1515      Immune  16 1515    HBsAg Negative  16 1515    Herpes Simplex Virus PCR       Herpes Simplex VIrus Culture       HIV       Hep C RNA Quant PCR       Hep C Antibody       AFP       Group B Strep       GBS Susceptibility to Clindamycin       GBS Susceptibility to Erythromycin       Fetal Fibronectin       Genetic Testing, Maternal Blood             Drug Screening     Test Value Date Time    Urine Drug Screen       Amphetamine Screen       Barbiturate Screen       Benzodiazepine Screen       Methadone Screen       Phencyclidine Screen       Opiates Screen       THC Screen       Cocaine Screen       Propoxyphene Screen       Buprenorphine Screen       Methamphetamine Screen       Oxycodone Screen       Tricyclic Antidepressants Screen                    Past OB History:     Obstetric History       T0      L0     SAB1   TAB0   Ectopic1   Molar0   Multiple0   Live Births0       # Outcome Date GA Lbr [place]/2nd Weight Sex Delivery Anes PTL Lv   3 Current            2 Ectopic 2018 8w0d   U       1 SAB 2017 6w0d   U              Past Medical History: Past Medical History:    Diagnosis Date   • Anxiety    • Depression    • History of pneumonia 2018   • Hypothyroid    • Thyroid disease       Past Surgical History Past Surgical History:   Procedure Laterality Date   • CHOLECYSTECTOMY     • KNEE SURGERY Right    • NASAL SEPTUM SURGERY     • NOSE SURGERY     • SHOULDER SURGERY Left       Family History: Family History   Problem Relation Age of Onset   • Cancer Mother       Social History:  reports that she has never smoked. She has never used smokeless tobacco.   reports that she drinks alcohol.   reports that she does not use drugs.        General ROS: Pertinent items are noted in HPI    Objective       Vital Signs Range for the last 24 hours  Temperature:     Temp Source:     BP: BP: ()/()    Pulse:     Respirations:     SPO2:     O2 Amount (l/min):     O2 Devices     Weight:       Physical Examination: General appearance - alert, well appearing, and in no distress    Presentation: NA   Cervix: Exam by:     Dilation:     Effacement:     Station:         Fetal Heart Rate Assessment   Method:     Beats/min:     Baseline:     Varibility:     Accels:     Decels:     Tracing Category:       Uterine Assessment   Method:     Frequency (min):     Ctx Count in 10 min:     Duration:     Intensity:     Intensity by IUPC:     Resting Tone:     Resting Tone by IUPC:     Ponder Units:       Laboratory Results: 2+proteinuria  Radiology Review:   Other Studies: NST reactive    Assessment/Plan       * No active hospital problems. *        Assessment:  1.  Intrauterine pregnancy at 28w1d weeks gestation with reactive fetal status.    2.  Pre-eclampsia   3.  Obstetrical history significant for is non-contributory.  4.  GBS status: No results found for: GBSANTIGEN  5. RH negative    Plan:  1. fetal and uterine monitoring  continuously, maternal labs, 24 hour urine for  total protein, check daily weights, fetal ultrasound for growth, steroids for fetal benefits and  Consultation  2. Plan of care  has been reviewed with patient and she VU  3.  Risks, benefits of treatment plan have been discussed.  4.  All questions have been answered.  5. Administer rhogam  6. GTT prior to steroids      Yesy Card, APRN  9/9/2019  3:55 PM

## 2019-09-10 ENCOUNTER — APPOINTMENT (OUTPATIENT)
Dept: WOMENS IMAGING | Facility: HOSPITAL | Age: 34
End: 2019-09-10

## 2019-09-10 LAB
COLLECT DURATION TIME UR: 24 HRS
CREAT UR-MCNC: 40.1 MG/DL
PROT 24H UR-MRATE: 607.7 MG/24HOURS (ref 0–150)
PROT UR-MCNC: 11.8 MG/DL
PROT UR-MCNC: 15.5 MG/DL
SPECIMEN VOL 24H UR: 5150 ML
TSH SERPL DL<=0.05 MIU/L-ACNC: 2.16 UIU/ML (ref 0.27–4.2)

## 2019-09-10 PROCEDURE — 76816 OB US FOLLOW-UP PER FETUS: CPT | Performed by: OBSTETRICS & GYNECOLOGY

## 2019-09-10 PROCEDURE — 82575 CREATININE CLEARANCE TEST: CPT | Performed by: NURSE PRACTITIONER

## 2019-09-10 PROCEDURE — 84156 ASSAY OF PROTEIN URINE: CPT | Performed by: OBSTETRICS & GYNECOLOGY

## 2019-09-10 PROCEDURE — 59025 FETAL NON-STRESS TEST: CPT

## 2019-09-10 PROCEDURE — 82570 ASSAY OF URINE CREATININE: CPT | Performed by: OBSTETRICS & GYNECOLOGY

## 2019-09-10 PROCEDURE — 25010000002 RHO D IMMUNE GLOBULIN 1500 UNIT/2ML SOLUTION PREFILLED SYRINGE: Performed by: OBSTETRICS & GYNECOLOGY

## 2019-09-10 PROCEDURE — 76816 OB US FOLLOW-UP PER FETUS: CPT

## 2019-09-10 PROCEDURE — 84156 ASSAY OF PROTEIN URINE: CPT | Performed by: NURSE PRACTITIONER

## 2019-09-10 PROCEDURE — 25010000002 BETAMETHASONE ACET & SOD PHOS PER 4 MG: Performed by: NURSE PRACTITIONER

## 2019-09-10 PROCEDURE — 81050 URINALYSIS VOLUME MEASURE: CPT | Performed by: NURSE PRACTITIONER

## 2019-09-10 RX ORDER — BUTALBITAL, ACETAMINOPHEN AND CAFFEINE 50; 325; 40 MG/1; MG/1; MG/1
1 TABLET ORAL ONCE
Status: COMPLETED | OUTPATIENT
Start: 2019-09-10 | End: 2019-09-10

## 2019-09-10 RX ORDER — NIFEDIPINE 30 MG/1
30 TABLET, EXTENDED RELEASE ORAL
Status: DISCONTINUED | OUTPATIENT
Start: 2019-09-10 | End: 2019-09-11

## 2019-09-10 RX ADMIN — LEVOTHYROXINE SODIUM 62.5 MCG: 125 TABLET ORAL at 06:16

## 2019-09-10 RX ADMIN — ACETAMINOPHEN 650 MG: 325 TABLET ORAL at 10:45

## 2019-09-10 RX ADMIN — MAGNESIUM SULFATE HEPTAHYDRATE 2 G/HR: 40 INJECTION, SOLUTION INTRAVENOUS at 12:00

## 2019-09-10 RX ADMIN — HUMAN RHO(D) IMMUNE GLOBULIN 1500 UNITS: 1500 SOLUTION INTRAMUSCULAR; INTRAVENOUS at 21:01

## 2019-09-10 RX ADMIN — MAGNESIUM SULFATE HEPTAHYDRATE 2 G/HR: 40 INJECTION, SOLUTION INTRAVENOUS at 00:13

## 2019-09-10 RX ADMIN — BUTALBITAL, ACETAMINOPHEN AND CAFFEINE 1 TABLET: 50; 325; 40 TABLET ORAL at 19:01

## 2019-09-10 RX ADMIN — OXCARBAZEPINE 300 MG: 150 TABLET, FILM COATED ORAL at 21:26

## 2019-09-10 RX ADMIN — ACETAMINOPHEN 650 MG: 325 TABLET ORAL at 04:47

## 2019-09-10 RX ADMIN — MAGNESIUM SULFATE HEPTAHYDRATE 2 G/HR: 40 INJECTION, SOLUTION INTRAVENOUS at 23:36

## 2019-09-10 RX ADMIN — DEXTROSE AND SODIUM CHLORIDE 75 ML/HR: 5; 200 INJECTION, SOLUTION INTRAVENOUS at 18:27

## 2019-09-10 RX ADMIN — ONDANSETRON HYDROCHLORIDE 4 MG: 4 TABLET, FILM COATED ORAL at 22:36

## 2019-09-10 RX ADMIN — BETAMETHASONE SODIUM PHOSPHATE AND BETAMETHASONE ACETATE 12 MG: 3; 3 INJECTION, SUSPENSION INTRA-ARTICULAR; INTRALESIONAL; INTRAMUSCULAR at 21:00

## 2019-09-10 RX ADMIN — LAMOTRIGINE 150 MG: 100 TABLET ORAL at 09:22

## 2019-09-10 RX ADMIN — OXCARBAZEPINE 300 MG: 150 TABLET, FILM COATED ORAL at 00:14

## 2019-09-10 RX ADMIN — NIFEDIPINE 30 MG: 30 TABLET, FILM COATED, EXTENDED RELEASE ORAL at 10:45

## 2019-09-10 RX ADMIN — ONDANSETRON HYDROCHLORIDE 4 MG: 4 TABLET, FILM COATED ORAL at 14:20

## 2019-09-10 NOTE — PROGRESS NOTES
9/10/2019  HD:1  34 y.o. yo female  at 28w2d    Subjective   Bianca feels well. Denies headache, reports good FM.      BPs since admission have been mild range. Labs normal.   PDC US this am normal.     She did 1h GTT last pm prior to steroids, and it was elevated at 150. She received 1st dose steroids last pm.        Objective   Temp: Temp:  [97.2 °F (36.2 °C)-98.6 °F (37 °C)] 97.2 °F (36.2 °C) Temp src: Axillary   BP: BP: (133-162)/(74-88) 133/81        Pulse: Heart Rate:  [] 100  RR: Resp:  [16-18] 16    General:  nad   Abdomen: Gravid, nontender         Lab Results   Component Value Date    WBC 14.87 (H) 2019    HGB 11.5 (L) 2019    HCT 34.7 2019    MCV 89.2 2019     2019    HEPBSAG Negative 2016     Results from last 7 days   Lab Units 19  1736   AST (SGOT) U/L 17     Results from last 7 days   Lab Units 19  1736   ALT (SGPT) U/L 15      Results from last 7 days   Lab Units 19  1736   CREATININE mg/dL 0.57      Results from last 7 days   Lab Units 19  1736   BILIRUBIN mg/dL <0.2*         Assessment  1.   34 y.o. yo female  at 28w2d  2. GHTN, currently no evidence severe disease. She is getting steroids, 24h urine pending. Will start daily procardia. Plan on turning off magnesium tomorrow. If cont to be stable, will likely be candidate for outpatient management of GHTN.   3. abnl glucola, will need 3hGTT after steroids.         This note has been electronically signed.    Monique Dasilva MD  September 10, 2019

## 2019-09-10 NOTE — H&P
History and Physical:    Subjective     Chief Complaint   Patient presents with   • Elevated Blood Pressure       Bianca Mercado is a 34 y.o. year old  with an Estimated Date of Delivery: 19 currently at 28w1d presenting with fatigue, nausea, intermittent headache, vision changes and elevated BP at work of 160/90. .    Prenatal care has been with Monique Dasilva MD.  It has been significant for low lying placenta with small amount vb at 20-21 weeks.She is also AMA, obese and used donor sperm.        Review of Systems  Pertinent items are noted in HPI.     Past Medical History:   Diagnosis Date   • Anxiety    • Depression    • History of pneumonia 2018   • Hypothyroid    • Thyroid disease      Past Surgical History:   Procedure Laterality Date   • CHOLECYSTECTOMY     • KNEE SURGERY Right    • NASAL SEPTUM SURGERY     • NOSE SURGERY     • SHOULDER SURGERY Left      Family History   Problem Relation Age of Onset   • Cancer Mother      Social History     Tobacco Use   • Smoking status: Never Smoker   • Smokeless tobacco: Never Used   Substance Use Topics   • Alcohol use: Yes     Comment: rare when not pregnant   • Drug use: No     Medications Prior to Admission   Medication Sig Dispense Refill Last Dose   • clonazePAM (KlonoPIN) 0.5 MG tablet    Past Month at Unknown time   • l-methylfolate 15 MG tablet tablet Take 5 mg by mouth 3 (Three) Times a Week.   2019 at Unknown time   • lamoTRIgine (LaMICtal) 100 MG tablet 150 mg Daily.   2019 at Unknown time   • levothyroxine (SYNTHROID, LEVOTHROID) 125 MCG tablet 62.5 mcg.   2019 at Unknown time   • OXcarbazepine (TRILEPTAL) 300 MG tablet    2019 at Unknown time   • Prenatal Vit-Fe Fumarate-FA (PRENATAL 27) 27-1 MG tablet tablet Take  by mouth Daily.   2019 at Unknown time     Allergies:  Sulfa antibiotics  OB History    Para Term  AB Living   3 0 0 0 2 0   SAB TAB Ectopic Molar Multiple Live Births   1 0 1 0 0 0      #  "Outcome Date GA Lbr [place]/2nd Weight Sex Delivery Anes PTL Lv   3 Current            2 Ectopic 2018 8w0d   U       1 SAB 2017 6w0d   U                Objective     /78 (BP Location: Left arm, Patient Position: Sitting)   Pulse 97   Temp 97.8 °F (36.6 °C) (Oral)   Resp 16   Ht 167.6 cm (66\")   Wt 98.9 kg (218 lb)   LMP 02/24/2019   SpO2 100%   Breastfeeding? Yes   BMI 35.19 kg/m²     Physical Exam    General:  No acute distress           Abdomen: Gravid, nontender       FHT's: reactive    Cervix: deferred   [employer]: Contraction are none     Lab Review   External Prenatal Results     Pregnancy Outside Results - Transcribed From Office Records - See Scanned Records For Details     Test Value Date Time    Hgb 11.5 g/dL 09/09/19 1736    Hct 34.7 % 09/09/19 1736    ABO O  09/09/19 1736    Rh Negative  09/09/19 1736    Antibody Screen Positive  09/09/19 1736    Glucose Fasting GTT       Glucose Tolerance Test 1 hour       Glucose Tolerance Test 3 hour       Gonorrhea (discrete)       Chlamydia (discrete) Negative  12/05/16 1515    RPR       VDRL       Syphilis Antibody       Rubella 79.5 IU/mL 12/05/16 1515      Immune  12/05/16 1515    HBsAg Negative  12/05/16 1515    Herpes Simplex Virus PCR       Herpes Simplex VIrus Culture       HIV       Hep C RNA Quant PCR       Hep C Antibody       AFP       Group B Strep       GBS Susceptibility to Clindamycin       GBS Susceptibility to Erythromycin       Fetal Fibronectin       Genetic Testing, Maternal Blood             Drug Screening     Test Value Date Time    Urine Drug Screen       Amphetamine Screen Negative  09/09/19 1805    Barbiturate Screen Negative  09/09/19 1805    Benzodiazepine Screen Negative  09/09/19 1805    Methadone Screen Negative  09/09/19 1805    Phencyclidine Screen Negative  09/09/19 1805    Opiates Screen Negative  09/09/19 1805    THC Screen Negative  09/09/19 1805    Cocaine Screen       Propoxyphene Screen Negative  09/09/19 1805    " Buprenorphine Screen Negative  09/09/19 1805    Methamphetamine Screen       Oxycodone Screen Negative  09/09/19 1805    Tricyclic Antidepressants Screen Negative  09/09/19 1805                    Assessment/Plan     ASSESSMENT  1. IUP at 28w1d   2Gestational HTN-   PLAN  1. Admit to [facility]   2.  Magnesium, GTT before steroid administration, BP control, labs and 24h urine.          Yue Hernandez MD  9/9/2019@

## 2019-09-11 ENCOUNTER — APPOINTMENT (OUTPATIENT)
Dept: WOMENS IMAGING | Facility: HOSPITAL | Age: 34
End: 2019-09-11

## 2019-09-11 LAB
COLLECT DURATION TIME UR: 24 HRS
CREAT CL 24H UR+SERPL-VRATE: 161.5 ML/MIN (ref 88–128)
CREAT CL 24H UR+SERPL-VRATE: 232.6 L/24 HR (ref 126.7–184.3)
CREAT UR-MCNC: 30.8 MG/DL
CREATINE 24H UR-MRATE: 1.59 G/24 HR (ref 0.7–1.6)
SPECIMEN VOL 24H UR: 5150 ML

## 2019-09-11 PROCEDURE — 59025 FETAL NON-STRESS TEST: CPT

## 2019-09-11 RX ORDER — DOCUSATE SODIUM 100 MG/1
100 CAPSULE, LIQUID FILLED ORAL 2 TIMES DAILY PRN
Status: DISCONTINUED | OUTPATIENT
Start: 2019-09-11 | End: 2019-09-12 | Stop reason: HOSPADM

## 2019-09-11 RX ADMIN — ONDANSETRON HYDROCHLORIDE 4 MG: 4 TABLET, FILM COATED ORAL at 08:40

## 2019-09-11 RX ADMIN — ACETAMINOPHEN 650 MG: 325 TABLET ORAL at 03:17

## 2019-09-11 RX ADMIN — DOCUSATE SODIUM 100 MG: 100 CAPSULE, LIQUID FILLED ORAL at 17:38

## 2019-09-11 RX ADMIN — CALCIUM CARBONATE 1 TABLET: 500 TABLET ORAL at 23:09

## 2019-09-11 RX ADMIN — LEVOTHYROXINE SODIUM 62.5 MCG: 125 TABLET ORAL at 06:12

## 2019-09-11 RX ADMIN — SODIUM CHLORIDE, PRESERVATIVE FREE 3 ML: 5 INJECTION INTRAVENOUS at 08:44

## 2019-09-11 RX ADMIN — ONDANSETRON HYDROCHLORIDE 4 MG: 4 TABLET, FILM COATED ORAL at 19:42

## 2019-09-11 RX ADMIN — OXCARBAZEPINE 300 MG: 150 TABLET, FILM COATED ORAL at 21:38

## 2019-09-11 RX ADMIN — LAMOTRIGINE 150 MG: 100 TABLET ORAL at 08:40

## 2019-09-11 NOTE — PROGRESS NOTES
2019  HD:2  34 y.o. yo female  at 28w3d    Subjective   Bianca feels well this morning. Mag was stopped this morning. No headache. BPs stable.     24h urine returned 600mg.       Objective   Temp: Temp:  [97.4 °F (36.3 °C)-98.9 °F (37.2 °C)] 98.4 °F (36.9 °C) Temp src: Oral   BP: BP: (118-143)/(61-81) 122/67        Pulse: Heart Rate:  [] 87  RR: Resp:  [15-18] 16    General:  nad   Abdomen: Gravid, nontender         Lab Results   Component Value Date    WBC 14.87 (H) 2019    HGB 11.5 (L) 2019    HCT 34.7 2019    MCV 89.2 2019     2019    HEPBSAG Negative 2016     Results from last 7 days   Lab Units 19  1736   AST (SGOT) U/L 17     Results from last 7 days   Lab Units 19  1736   ALT (SGPT) U/L 15      Results from last 7 days   Lab Units 19  1736   CREATININE mg/dL 0.57      Results from last 7 days   Lab Units 19  1736   BILIRUBIN mg/dL <0.2*     nst reviewed    Assessment  1.   34 y.o. yo female  at 28w3d  2. Preeclampsia, no severe si/sx. Given that her 24urine protein was elevated, will not use BP medication at this point. If she remains stable, she might be a candidate for outpatient management, but will monitor another day off magnesium.     Plan  1. Cont current hospital care.       This note has been electronically signed.    Monique Dasilva MD  2019

## 2019-09-11 NOTE — PLAN OF CARE
Problem: Patient Care Overview  Goal: Plan of Care Review  Outcome: Ongoing (interventions implemented as appropriate)   09/11/19 1733   Coping/Psychosocial   Plan of Care Reviewed With patient   OTHER   Outcome Summary Mag turned off this morning. Pt. doing good throughout the day. BP stable off medications.      Goal: Individualization and Mutuality  Outcome: Ongoing (interventions implemented as appropriate)   09/11/19 1733   Individualization   Patient Specific Goals (Include Timeframe) Pt. would like to go home within 1-2 days if possible and if doing better.      Goal: Discharge Needs Assessment  Outcome: Ongoing (interventions implemented as appropriate)   09/11/19 1733   Discharge Needs Assessment   Readmission Within the Last 30 Days no previous admission in last 30 days   Concerns to be Addressed no discharge needs identified   Patient/Family Anticipates Transition to home   Patient/Family Anticipated Services at Transition none   Transportation Concerns car, none   Anticipated Changes Related to Illness none   Equipment Needed After Discharge none   Disability   Equipment Currently Used at Home none     Goal: Interprofessional Rounds/Family Conf  Outcome: Ongoing (interventions implemented as appropriate)      Problem: Hypertensive Disorders in Pregnancy (Adult,Obstetrics,Pediatric)  Intervention: Monitor Neurologic Status and Fluid Balance   09/11/19 1733   Safety Management   Medication Review/Management medications reviewed     Intervention: Closely Monitor Maternal Fluid Balance   09/11/19 0616 09/11/19 0840   Edema   Edema dependent;foot, left;foot, right --    Dependent Edema --  2+ (Mild)   Generalized Edema --  1+ (Trace)   Foot, Left Edema --  2+ (Mild)   Foot, Right Edema --  2+ (Mild)       Goal: Signs and Symptoms of Listed Potential Problems Will be Absent, Minimized or Managed (Hypertensive Disorders in Pregnancy)  Outcome: Ongoing (interventions implemented as appropriate)   09/11/19 1733    Goal/Outcome Evaluation   Problems Assessed (Hypertensive Disorders in Pregnancy) all   Problems Present (Hypertensive/in PG) none

## 2019-09-12 VITALS
BODY MASS INDEX: 34.55 KG/M2 | DIASTOLIC BLOOD PRESSURE: 55 MMHG | HEART RATE: 57 BPM | HEIGHT: 66 IN | RESPIRATION RATE: 16 BRPM | OXYGEN SATURATION: 97 % | TEMPERATURE: 98.6 F | WEIGHT: 215 LBS | SYSTOLIC BLOOD PRESSURE: 113 MMHG

## 2019-09-12 LAB
ALP SERPL-CCNC: 100 U/L (ref 39–117)
ALT SERPL W P-5'-P-CCNC: 14 U/L (ref 1–33)
AST SERPL-CCNC: 12 U/L (ref 1–32)
BILIRUB SERPL-MCNC: <0.2 MG/DL (ref 0.2–1.2)
CREAT BLD-MCNC: 0.62 MG/DL (ref 0.57–1)
DEPRECATED RDW RBC AUTO: 46.8 FL (ref 37–54)
ERYTHROCYTE [DISTWIDTH] IN BLOOD BY AUTOMATED COUNT: 13.8 % (ref 12.3–15.4)
HCT VFR BLD AUTO: 30.5 % (ref 34–46.6)
HGB BLD-MCNC: 9.9 G/DL (ref 12–15.9)
LDH SERPL-CCNC: 137 U/L (ref 135–214)
MCH RBC QN AUTO: 29.8 PG (ref 26.6–33)
MCHC RBC AUTO-ENTMCNC: 32.5 G/DL (ref 31.5–35.7)
MCV RBC AUTO: 91.9 FL (ref 79–97)
PLATELET # BLD AUTO: 237 10*3/MM3 (ref 140–450)
PMV BLD AUTO: 11.3 FL (ref 6–12)
RBC # BLD AUTO: 3.32 10*6/MM3 (ref 3.77–5.28)
URATE SERPL-MCNC: 4.8 MG/DL (ref 2.4–5.7)
WBC NRBC COR # BLD: 11.79 10*3/MM3 (ref 3.4–10.8)

## 2019-09-12 PROCEDURE — 82565 ASSAY OF CREATININE: CPT | Performed by: OBSTETRICS & GYNECOLOGY

## 2019-09-12 PROCEDURE — 83615 LACTATE (LD) (LDH) ENZYME: CPT | Performed by: OBSTETRICS & GYNECOLOGY

## 2019-09-12 PROCEDURE — 84450 TRANSFERASE (AST) (SGOT): CPT | Performed by: OBSTETRICS & GYNECOLOGY

## 2019-09-12 PROCEDURE — 84460 ALANINE AMINO (ALT) (SGPT): CPT | Performed by: OBSTETRICS & GYNECOLOGY

## 2019-09-12 PROCEDURE — 85027 COMPLETE CBC AUTOMATED: CPT | Performed by: OBSTETRICS & GYNECOLOGY

## 2019-09-12 PROCEDURE — 84550 ASSAY OF BLOOD/URIC ACID: CPT | Performed by: OBSTETRICS & GYNECOLOGY

## 2019-09-12 PROCEDURE — 84075 ASSAY ALKALINE PHOSPHATASE: CPT | Performed by: OBSTETRICS & GYNECOLOGY

## 2019-09-12 PROCEDURE — 59025 FETAL NON-STRESS TEST: CPT

## 2019-09-12 PROCEDURE — 82247 BILIRUBIN TOTAL: CPT | Performed by: OBSTETRICS & GYNECOLOGY

## 2019-09-12 RX ADMIN — LAMOTRIGINE 150 MG: 100 TABLET ORAL at 08:18

## 2019-09-12 RX ADMIN — LEVOTHYROXINE SODIUM 62.5 MCG: 125 TABLET ORAL at 06:47

## 2019-09-12 NOTE — DISCHARGE INSTR - APPOINTMENTS
Keep all follow up appointments. Call you doctor if you have any questions or concerns or return to the nearest hospital

## 2019-09-12 NOTE — DISCHARGE SUMMARY
2019  HD:3  34 y.o. yo female  at 28w4d    Subjective   Bianca  Feels well, no headache, visual changes, RUQ pain.       BPs over last 24h since mag off have been normal, labs this morning normal/ unchanged. .       Objective   Temp: Temp:  [98.2 °F (36.8 °C)-98.9 °F (37.2 °C)] 98.6 °F (37 °C) Temp src: Oral   BP: BP: (113-135)/(55-68) 113/55        Pulse: Heart Rate:  [55-88] 57  RR: Resp:  [16] 16    General:  nad   Abdomen: Gravid, nontender         Lab Results   Component Value Date    WBC 11.79 (H) 2019    HGB 9.9 (L) 2019    HCT 30.5 (L) 2019    MCV 91.9 2019     2019    HEPBSAG Negative 2016     Results from last 7 days   Lab Units 19  0542 19  1736   AST (SGOT) U/L 12 17     Results from last 7 days   Lab Units 19  0542 19  1736   ALT (SGPT) U/L 14 15      Results from last 7 days   Lab Units 19  0542 19  1736   CREATININE mg/dL 0.62 0.57      Results from last 7 days   Lab Units 19  0542 19  1736   BILIRUBIN mg/dL <0.2* <0.2*     NST reactive    Assessment  1.   34 y.o. yo female  at 28w4d  2. Preeclampsia, s/p steroids and mag. Stable now with no mediation.     Plan  D/w PDC, will plan on outpatient management. Twice weekly in office monitoring. Strict precautions given.      This note has been electronically signed.    Monique Dasilva MD  2019

## 2019-09-18 ENCOUNTER — HOSPITAL ENCOUNTER (INPATIENT)
Facility: HOSPITAL | Age: 34
LOS: 23 days | Discharge: HOME | End: 2019-10-11
Attending: OBSTETRICS & GYNECOLOGY | Admitting: OBSTETRICS & GYNECOLOGY

## 2019-09-18 DIAGNOSIS — O14.92 PRE-ECLAMPSIA IN SECOND TRIMESTER: ICD-10-CM

## 2019-09-18 LAB
ABO GROUP BLD: NORMAL
ALP SERPL-CCNC: 127 U/L (ref 39–117)
ALT SERPL W P-5'-P-CCNC: 12 U/L (ref 1–33)
ANTI-D, PASSIVE: NORMAL
AST SERPL-CCNC: 13 U/L (ref 1–32)
BILIRUB SERPL-MCNC: <0.2 MG/DL (ref 0.2–1.2)
BLD GP AB SCN SERPL QL: POSITIVE
CREAT BLD-MCNC: 0.63 MG/DL (ref 0.57–1)
DEPRECATED RDW RBC AUTO: 43.8 FL (ref 37–54)
ERYTHROCYTE [DISTWIDTH] IN BLOOD BY AUTOMATED COUNT: 13.2 % (ref 12.3–15.4)
HCT VFR BLD AUTO: 35.4 % (ref 34–46.6)
HGB BLD-MCNC: 11.7 G/DL (ref 12–15.9)
LDH SERPL-CCNC: 180 U/L (ref 135–214)
MCH RBC QN AUTO: 29.8 PG (ref 26.6–33)
MCHC RBC AUTO-ENTMCNC: 33.1 G/DL (ref 31.5–35.7)
MCV RBC AUTO: 90.1 FL (ref 79–97)
PLATELET # BLD AUTO: 251 10*3/MM3 (ref 140–450)
PMV BLD AUTO: 12.4 FL (ref 6–12)
RBC # BLD AUTO: 3.93 10*6/MM3 (ref 3.77–5.28)
RH BLD: NEGATIVE
T&S EXPIRATION DATE: NORMAL
URATE SERPL-MCNC: 5.5 MG/DL (ref 2.4–5.7)
WBC NRBC COR # BLD: 12.87 10*3/MM3 (ref 3.4–10.8)

## 2019-09-18 PROCEDURE — 83615 LACTATE (LD) (LDH) ENZYME: CPT | Performed by: OBSTETRICS & GYNECOLOGY

## 2019-09-18 PROCEDURE — 82565 ASSAY OF CREATININE: CPT | Performed by: OBSTETRICS & GYNECOLOGY

## 2019-09-18 PROCEDURE — 59025 FETAL NON-STRESS TEST: CPT

## 2019-09-18 PROCEDURE — 82247 BILIRUBIN TOTAL: CPT | Performed by: OBSTETRICS & GYNECOLOGY

## 2019-09-18 PROCEDURE — 86901 BLOOD TYPING SEROLOGIC RH(D): CPT | Performed by: OBSTETRICS & GYNECOLOGY

## 2019-09-18 PROCEDURE — 84075 ASSAY ALKALINE PHOSPHATASE: CPT | Performed by: OBSTETRICS & GYNECOLOGY

## 2019-09-18 PROCEDURE — 84460 ALANINE AMINO (ALT) (SGPT): CPT | Performed by: OBSTETRICS & GYNECOLOGY

## 2019-09-18 PROCEDURE — 86870 RBC ANTIBODY IDENTIFICATION: CPT | Performed by: OBSTETRICS & GYNECOLOGY

## 2019-09-18 PROCEDURE — 84450 TRANSFERASE (AST) (SGOT): CPT | Performed by: OBSTETRICS & GYNECOLOGY

## 2019-09-18 PROCEDURE — 84550 ASSAY OF BLOOD/URIC ACID: CPT | Performed by: OBSTETRICS & GYNECOLOGY

## 2019-09-18 PROCEDURE — 85027 COMPLETE CBC AUTOMATED: CPT | Performed by: OBSTETRICS & GYNECOLOGY

## 2019-09-18 PROCEDURE — 86900 BLOOD TYPING SEROLOGIC ABO: CPT | Performed by: OBSTETRICS & GYNECOLOGY

## 2019-09-18 PROCEDURE — 86850 RBC ANTIBODY SCREEN: CPT | Performed by: OBSTETRICS & GYNECOLOGY

## 2019-09-18 RX ORDER — ACETAMINOPHEN 325 MG/1
650 TABLET ORAL EVERY 6 HOURS PRN
Status: DISCONTINUED | OUTPATIENT
Start: 2019-09-18 | End: 2019-10-07 | Stop reason: HOSPADM

## 2019-09-18 RX ORDER — OXCARBAZEPINE 150 MG/1
300 TABLET, FILM COATED ORAL NIGHTLY
Status: DISCONTINUED | OUTPATIENT
Start: 2019-09-18 | End: 2019-10-11 | Stop reason: HOSPADM

## 2019-09-18 RX ORDER — LABETALOL 200 MG/1
200 TABLET, FILM COATED ORAL 2 TIMES DAILY
COMMUNITY
End: 2019-10-11 | Stop reason: HOSPADM

## 2019-09-18 RX ORDER — LABETALOL 200 MG/1
200 TABLET, FILM COATED ORAL EVERY 12 HOURS SCHEDULED
Status: DISCONTINUED | OUTPATIENT
Start: 2019-09-18 | End: 2019-09-19

## 2019-09-18 RX ORDER — ASPIRIN 81 MG/1
81 TABLET, CHEWABLE ORAL DAILY
Status: ON HOLD | COMMUNITY
End: 2019-10-07

## 2019-09-18 RX ADMIN — OXCARBAZEPINE 300 MG: 150 TABLET, FILM COATED ORAL at 20:41

## 2019-09-18 RX ADMIN — LABETALOL HYDROCHLORIDE 200 MG: 200 TABLET, FILM COATED ORAL at 20:41

## 2019-09-19 ENCOUNTER — APPOINTMENT (OUTPATIENT)
Dept: WOMENS IMAGING | Facility: HOSPITAL | Age: 34
End: 2019-09-19

## 2019-09-19 LAB
COLLECT DURATION TIME UR: 24 HRS
GLUCOSE 1H P 100 G GLC PO SERPL-MCNC: 166 MG/DL (ref 74–180)
GLUCOSE 2H P 100 G GLC PO SERPL-MCNC: 127 MG/DL (ref 74–155)
GLUCOSE 3H P 100 G GLC PO SERPL-MCNC: 102 MG/DL (ref 74–140)
GLUCOSE P FAST SERPL-MCNC: 85 MG/DL (ref 74–106)
PROT 24H UR-MRATE: 1244.3 MG/24HOURS (ref 0–150)
PROT UR-MCNC: 71.1 MG/DL
SPECIMEN VOL 24H UR: 1750 ML

## 2019-09-19 PROCEDURE — 76819 FETAL BIOPHYS PROFIL W/O NST: CPT

## 2019-09-19 PROCEDURE — 82951 GLUCOSE TOLERANCE TEST (GTT): CPT | Performed by: OBSTETRICS & GYNECOLOGY

## 2019-09-19 PROCEDURE — 81050 URINALYSIS VOLUME MEASURE: CPT | Performed by: OBSTETRICS & GYNECOLOGY

## 2019-09-19 PROCEDURE — 84156 ASSAY OF PROTEIN URINE: CPT | Performed by: OBSTETRICS & GYNECOLOGY

## 2019-09-19 PROCEDURE — 76819 FETAL BIOPHYS PROFIL W/O NST: CPT | Performed by: OBSTETRICS & GYNECOLOGY

## 2019-09-19 PROCEDURE — 82952 GTT-ADDED SAMPLES: CPT | Performed by: OBSTETRICS & GYNECOLOGY

## 2019-09-19 PROCEDURE — 59025 FETAL NON-STRESS TEST: CPT

## 2019-09-19 RX ORDER — LABETALOL 200 MG/1
200 TABLET, FILM COATED ORAL EVERY 8 HOURS SCHEDULED
Status: DISCONTINUED | OUTPATIENT
Start: 2019-09-19 | End: 2019-09-22

## 2019-09-19 RX ORDER — LEVOTHYROXINE SODIUM 0.03 MG/1
62.5 TABLET ORAL
Status: DISCONTINUED | OUTPATIENT
Start: 2019-09-19 | End: 2019-10-11 | Stop reason: HOSPADM

## 2019-09-19 RX ADMIN — LABETALOL HYDROCHLORIDE 200 MG: 200 TABLET, FILM COATED ORAL at 08:38

## 2019-09-19 RX ADMIN — LEVOTHYROXINE SODIUM 62.5 MCG: 25 TABLET ORAL at 09:33

## 2019-09-19 RX ADMIN — LABETALOL HYDROCHLORIDE 200 MG: 200 TABLET, FILM COATED ORAL at 22:18

## 2019-09-19 RX ADMIN — LAMOTRIGINE 150 MG: 100 TABLET ORAL at 08:38

## 2019-09-19 RX ADMIN — ACETAMINOPHEN 650 MG: 325 TABLET ORAL at 03:14

## 2019-09-19 RX ADMIN — LABETALOL HYDROCHLORIDE 200 MG: 200 TABLET, FILM COATED ORAL at 15:13

## 2019-09-19 RX ADMIN — OXCARBAZEPINE 300 MG: 150 TABLET, FILM COATED ORAL at 20:54

## 2019-09-20 PROCEDURE — 59025 FETAL NON-STRESS TEST: CPT

## 2019-09-20 RX ADMIN — LAMOTRIGINE 150 MG: 100 TABLET ORAL at 08:26

## 2019-09-20 RX ADMIN — LABETALOL HYDROCHLORIDE 200 MG: 200 TABLET, FILM COATED ORAL at 14:52

## 2019-09-20 RX ADMIN — LABETALOL HYDROCHLORIDE 200 MG: 200 TABLET, FILM COATED ORAL at 23:06

## 2019-09-20 RX ADMIN — LEVOTHYROXINE SODIUM 62.5 MCG: 25 TABLET ORAL at 06:05

## 2019-09-20 RX ADMIN — LABETALOL HYDROCHLORIDE 200 MG: 200 TABLET, FILM COATED ORAL at 06:06

## 2019-09-20 RX ADMIN — OXCARBAZEPINE 300 MG: 150 TABLET, FILM COATED ORAL at 20:40

## 2019-09-21 LAB
ALP SERPL-CCNC: 135 U/L (ref 39–117)
ALT SERPL W P-5'-P-CCNC: 12 U/L (ref 1–33)
AST SERPL-CCNC: 13 U/L (ref 1–32)
BILIRUB SERPL-MCNC: <0.2 MG/DL (ref 0.2–1.2)
CREAT BLD-MCNC: 0.55 MG/DL (ref 0.57–1)
DEPRECATED RDW RBC AUTO: 44.7 FL (ref 37–54)
ERYTHROCYTE [DISTWIDTH] IN BLOOD BY AUTOMATED COUNT: 13.6 % (ref 12.3–15.4)
HCT VFR BLD AUTO: 35.2 % (ref 34–46.6)
HGB BLD-MCNC: 11.4 G/DL (ref 12–15.9)
LDH SERPL-CCNC: 132 U/L (ref 135–214)
MCH RBC QN AUTO: 29.3 PG (ref 26.6–33)
MCHC RBC AUTO-ENTMCNC: 32.4 G/DL (ref 31.5–35.7)
MCV RBC AUTO: 90.5 FL (ref 79–97)
PLATELET # BLD AUTO: 224 10*3/MM3 (ref 140–450)
PMV BLD AUTO: 12 FL (ref 6–12)
RBC # BLD AUTO: 3.89 10*6/MM3 (ref 3.77–5.28)
URATE SERPL-MCNC: 5.6 MG/DL (ref 2.4–5.7)
WBC NRBC COR # BLD: 12.43 10*3/MM3 (ref 3.4–10.8)

## 2019-09-21 PROCEDURE — 85027 COMPLETE CBC AUTOMATED: CPT | Performed by: OBSTETRICS & GYNECOLOGY

## 2019-09-21 PROCEDURE — 83615 LACTATE (LD) (LDH) ENZYME: CPT | Performed by: OBSTETRICS & GYNECOLOGY

## 2019-09-21 PROCEDURE — 84450 TRANSFERASE (AST) (SGOT): CPT | Performed by: OBSTETRICS & GYNECOLOGY

## 2019-09-21 PROCEDURE — 82247 BILIRUBIN TOTAL: CPT | Performed by: OBSTETRICS & GYNECOLOGY

## 2019-09-21 PROCEDURE — 82565 ASSAY OF CREATININE: CPT | Performed by: OBSTETRICS & GYNECOLOGY

## 2019-09-21 PROCEDURE — 84075 ASSAY ALKALINE PHOSPHATASE: CPT | Performed by: OBSTETRICS & GYNECOLOGY

## 2019-09-21 PROCEDURE — 84550 ASSAY OF BLOOD/URIC ACID: CPT | Performed by: OBSTETRICS & GYNECOLOGY

## 2019-09-21 PROCEDURE — 59025 FETAL NON-STRESS TEST: CPT

## 2019-09-21 PROCEDURE — 84460 ALANINE AMINO (ALT) (SGPT): CPT | Performed by: OBSTETRICS & GYNECOLOGY

## 2019-09-21 RX ADMIN — LABETALOL HYDROCHLORIDE 200 MG: 200 TABLET, FILM COATED ORAL at 06:08

## 2019-09-21 RX ADMIN — LEVOTHYROXINE SODIUM 62.5 MCG: 25 TABLET ORAL at 06:08

## 2019-09-21 RX ADMIN — LABETALOL HYDROCHLORIDE 200 MG: 200 TABLET, FILM COATED ORAL at 22:19

## 2019-09-21 RX ADMIN — LAMOTRIGINE 150 MG: 100 TABLET ORAL at 08:27

## 2019-09-21 RX ADMIN — LABETALOL HYDROCHLORIDE 200 MG: 200 TABLET, FILM COATED ORAL at 13:18

## 2019-09-21 RX ADMIN — OXCARBAZEPINE 300 MG: 150 TABLET, FILM COATED ORAL at 21:39

## 2019-09-22 LAB
ALP SERPL-CCNC: 129 U/L (ref 39–117)
ALT SERPL W P-5'-P-CCNC: 14 U/L (ref 1–33)
AST SERPL-CCNC: 15 U/L (ref 1–32)
BILIRUB SERPL-MCNC: <0.2 MG/DL (ref 0.2–1.2)
CREAT BLD-MCNC: 0.64 MG/DL (ref 0.57–1)
DEPRECATED RDW RBC AUTO: 44.7 FL (ref 37–54)
ERYTHROCYTE [DISTWIDTH] IN BLOOD BY AUTOMATED COUNT: 13.6 % (ref 12.3–15.4)
HCT VFR BLD AUTO: 34.6 % (ref 34–46.6)
HGB BLD-MCNC: 11.2 G/DL (ref 12–15.9)
LDH SERPL-CCNC: 138 U/L (ref 135–214)
MCH RBC QN AUTO: 29.4 PG (ref 26.6–33)
MCHC RBC AUTO-ENTMCNC: 32.4 G/DL (ref 31.5–35.7)
MCV RBC AUTO: 90.8 FL (ref 79–97)
PLATELET # BLD AUTO: 230 10*3/MM3 (ref 140–450)
PMV BLD AUTO: 11.7 FL (ref 6–12)
RBC # BLD AUTO: 3.81 10*6/MM3 (ref 3.77–5.28)
URATE SERPL-MCNC: 5.7 MG/DL (ref 2.4–5.7)
WBC NRBC COR # BLD: 12.83 10*3/MM3 (ref 3.4–10.8)

## 2019-09-22 PROCEDURE — 84450 TRANSFERASE (AST) (SGOT): CPT | Performed by: OBSTETRICS & GYNECOLOGY

## 2019-09-22 PROCEDURE — 85027 COMPLETE CBC AUTOMATED: CPT | Performed by: OBSTETRICS & GYNECOLOGY

## 2019-09-22 PROCEDURE — 59025 FETAL NON-STRESS TEST: CPT

## 2019-09-22 PROCEDURE — 84550 ASSAY OF BLOOD/URIC ACID: CPT | Performed by: OBSTETRICS & GYNECOLOGY

## 2019-09-22 PROCEDURE — 84460 ALANINE AMINO (ALT) (SGPT): CPT | Performed by: OBSTETRICS & GYNECOLOGY

## 2019-09-22 PROCEDURE — 82565 ASSAY OF CREATININE: CPT | Performed by: OBSTETRICS & GYNECOLOGY

## 2019-09-22 PROCEDURE — 83615 LACTATE (LD) (LDH) ENZYME: CPT | Performed by: OBSTETRICS & GYNECOLOGY

## 2019-09-22 PROCEDURE — 84075 ASSAY ALKALINE PHOSPHATASE: CPT | Performed by: OBSTETRICS & GYNECOLOGY

## 2019-09-22 PROCEDURE — 86870 RBC ANTIBODY IDENTIFICATION: CPT | Performed by: OBSTETRICS & GYNECOLOGY

## 2019-09-22 PROCEDURE — 82247 BILIRUBIN TOTAL: CPT | Performed by: OBSTETRICS & GYNECOLOGY

## 2019-09-22 PROCEDURE — 86900 BLOOD TYPING SEROLOGIC ABO: CPT | Performed by: OBSTETRICS & GYNECOLOGY

## 2019-09-22 PROCEDURE — 86850 RBC ANTIBODY SCREEN: CPT | Performed by: OBSTETRICS & GYNECOLOGY

## 2019-09-22 PROCEDURE — 86901 BLOOD TYPING SEROLOGIC RH(D): CPT | Performed by: OBSTETRICS & GYNECOLOGY

## 2019-09-22 RX ORDER — LABETALOL 300 MG/1
300 TABLET, FILM COATED ORAL EVERY 8 HOURS SCHEDULED
Status: DISCONTINUED | OUTPATIENT
Start: 2019-09-22 | End: 2019-09-23

## 2019-09-22 RX ADMIN — OXCARBAZEPINE 300 MG: 150 TABLET, FILM COATED ORAL at 21:07

## 2019-09-22 RX ADMIN — LEVOTHYROXINE SODIUM 62.5 MCG: 25 TABLET ORAL at 05:42

## 2019-09-22 RX ADMIN — LABETALOL HYDROCHLORIDE 300 MG: 300 TABLET, FILM COATED ORAL at 22:22

## 2019-09-22 RX ADMIN — LABETALOL HYDROCHLORIDE 300 MG: 300 TABLET, FILM COATED ORAL at 13:34

## 2019-09-22 RX ADMIN — LAMOTRIGINE 150 MG: 100 TABLET ORAL at 08:35

## 2019-09-22 RX ADMIN — LABETALOL HYDROCHLORIDE 200 MG: 200 TABLET, FILM COATED ORAL at 05:42

## 2019-09-23 ENCOUNTER — APPOINTMENT (OUTPATIENT)
Dept: WOMENS IMAGING | Facility: HOSPITAL | Age: 34
End: 2019-09-23

## 2019-09-23 LAB
ABO GROUP BLD: NORMAL
ANTI-D, PASSIVE: NORMAL
BLD GP AB SCN SERPL QL: POSITIVE
RH BLD: NEGATIVE
T&S EXPIRATION DATE: NORMAL

## 2019-09-23 PROCEDURE — 59025 FETAL NON-STRESS TEST: CPT

## 2019-09-23 PROCEDURE — 76819 FETAL BIOPHYS PROFIL W/O NST: CPT

## 2019-09-23 PROCEDURE — 76818 FETAL BIOPHYS PROFILE W/NST: CPT

## 2019-09-23 PROCEDURE — 76819 FETAL BIOPHYS PROFIL W/O NST: CPT | Performed by: OBSTETRICS & GYNECOLOGY

## 2019-09-23 PROCEDURE — 25010000002 HYDRALAZINE PER 20 MG: Performed by: OBSTETRICS & GYNECOLOGY

## 2019-09-23 RX ORDER — DOCUSATE SODIUM 100 MG/1
100 CAPSULE, LIQUID FILLED ORAL 2 TIMES DAILY PRN
Status: DISPENSED | OUTPATIENT
Start: 2019-09-23 | End: 2019-09-25

## 2019-09-23 RX ORDER — MAGNESIUM SULFATE HEPTAHYDRATE 40 MG/ML
1 INJECTION, SOLUTION INTRAVENOUS CONTINUOUS
Status: DISPENSED | OUTPATIENT
Start: 2019-09-23 | End: 2019-09-24

## 2019-09-23 RX ORDER — LIDOCAINE HYDROCHLORIDE 10 MG/ML
5 INJECTION, SOLUTION EPIDURAL; INFILTRATION; INTRACAUDAL; PERINEURAL AS NEEDED
Status: DISCONTINUED | OUTPATIENT
Start: 2019-09-23 | End: 2019-10-01

## 2019-09-23 RX ORDER — DEXTROSE AND SODIUM CHLORIDE 5; .2 G/100ML; G/100ML
25 INJECTION, SOLUTION INTRAVENOUS CONTINUOUS
Status: DISCONTINUED | OUTPATIENT
Start: 2019-09-23 | End: 2019-10-06

## 2019-09-23 RX ORDER — LABETALOL 200 MG/1
200 TABLET, FILM COATED ORAL EVERY 8 HOURS SCHEDULED
Status: DISCONTINUED | OUTPATIENT
Start: 2019-09-23 | End: 2019-09-24 | Stop reason: DRUGHIGH

## 2019-09-23 RX ORDER — BUTALBITAL, ACETAMINOPHEN AND CAFFEINE 50; 325; 40 MG/1; MG/1; MG/1
1 TABLET ORAL ONCE
Status: COMPLETED | OUTPATIENT
Start: 2019-09-23 | End: 2019-09-23

## 2019-09-23 RX ORDER — NIFEDIPINE 30 MG/1
30 TABLET, EXTENDED RELEASE ORAL
Status: DISCONTINUED | OUTPATIENT
Start: 2019-09-23 | End: 2019-09-25

## 2019-09-23 RX ORDER — HYDRALAZINE HYDROCHLORIDE 20 MG/ML
5 INJECTION INTRAMUSCULAR; INTRAVENOUS ONCE
Status: COMPLETED | OUTPATIENT
Start: 2019-09-23 | End: 2019-09-23

## 2019-09-23 RX ORDER — SODIUM CHLORIDE 0.9 % (FLUSH) 0.9 %
3 SYRINGE (ML) INJECTION EVERY 12 HOURS SCHEDULED
Status: DISCONTINUED | OUTPATIENT
Start: 2019-09-23 | End: 2019-10-01

## 2019-09-23 RX ORDER — SODIUM CHLORIDE 0.9 % (FLUSH) 0.9 %
10 SYRINGE (ML) INJECTION AS NEEDED
Status: DISCONTINUED | OUTPATIENT
Start: 2019-09-23 | End: 2019-10-01

## 2019-09-23 RX ADMIN — LEVOTHYROXINE SODIUM 62.5 MCG: 25 TABLET ORAL at 05:59

## 2019-09-23 RX ADMIN — LABETALOL HYDROCHLORIDE 300 MG: 300 TABLET, FILM COATED ORAL at 05:59

## 2019-09-23 RX ADMIN — NIFEDIPINE 30 MG: 30 TABLET, FILM COATED, EXTENDED RELEASE ORAL at 09:04

## 2019-09-23 RX ADMIN — MAGNESIUM SULFATE HEPTAHYDRATE 1 G/HR: 40 INJECTION, SOLUTION INTRAVENOUS at 09:04

## 2019-09-23 RX ADMIN — MAGNESIUM SULFATE HEPTAHYDRATE 2 G/HR: 40 INJECTION, SOLUTION INTRAVENOUS at 01:39

## 2019-09-23 RX ADMIN — LABETALOL HYDROCHLORIDE 200 MG: 200 TABLET, FILM COATED ORAL at 22:15

## 2019-09-23 RX ADMIN — OXCARBAZEPINE 300 MG: 150 TABLET, FILM COATED ORAL at 21:01

## 2019-09-23 RX ADMIN — SODIUM CHLORIDE, PRESERVATIVE FREE 3 ML: 5 INJECTION INTRAVENOUS at 02:00

## 2019-09-23 RX ADMIN — ACETAMINOPHEN 650 MG: 325 TABLET ORAL at 06:00

## 2019-09-23 RX ADMIN — LAMOTRIGINE 150 MG: 100 TABLET ORAL at 08:01

## 2019-09-23 RX ADMIN — LABETALOL HYDROCHLORIDE 200 MG: 200 TABLET, FILM COATED ORAL at 14:58

## 2019-09-23 RX ADMIN — DOCUSATE SODIUM 100 MG: 100 CAPSULE, LIQUID FILLED ORAL at 01:59

## 2019-09-23 RX ADMIN — BUTALBITAL, ACETAMINOPHEN AND CAFFEINE 1 TABLET: 50; 325; 40 TABLET ORAL at 20:02

## 2019-09-23 RX ADMIN — ACETAMINOPHEN 650 MG: 325 TABLET ORAL at 15:00

## 2019-09-23 RX ADMIN — HYDRALAZINE HYDROCHLORIDE 5 MG: 20 INJECTION INTRAMUSCULAR; INTRAVENOUS at 01:14

## 2019-09-24 PROCEDURE — 59025 FETAL NON-STRESS TEST: CPT

## 2019-09-24 RX ORDER — LABETALOL 300 MG/1
300 TABLET, FILM COATED ORAL EVERY 8 HOURS SCHEDULED
Status: COMPLETED | OUTPATIENT
Start: 2019-09-25 | End: 2019-09-26

## 2019-09-24 RX ORDER — NIFEDIPINE 10 MG/1
10 CAPSULE ORAL ONCE
Status: COMPLETED | OUTPATIENT
Start: 2019-09-24 | End: 2019-09-24

## 2019-09-24 RX ORDER — LABETALOL 300 MG/1
300 TABLET, FILM COATED ORAL EVERY 8 HOURS SCHEDULED
Status: DISCONTINUED | OUTPATIENT
Start: 2019-09-24 | End: 2019-09-24

## 2019-09-24 RX ADMIN — LABETALOL HYDROCHLORIDE 200 MG: 200 TABLET, FILM COATED ORAL at 21:52

## 2019-09-24 RX ADMIN — LEVOTHYROXINE SODIUM 62.5 MCG: 25 TABLET ORAL at 06:04

## 2019-09-24 RX ADMIN — LABETALOL HYDROCHLORIDE 200 MG: 200 TABLET, FILM COATED ORAL at 13:49

## 2019-09-24 RX ADMIN — ACETAMINOPHEN 650 MG: 325 TABLET ORAL at 08:08

## 2019-09-24 RX ADMIN — NIFEDIPINE 30 MG: 30 TABLET, FILM COATED, EXTENDED RELEASE ORAL at 08:08

## 2019-09-24 RX ADMIN — ACETAMINOPHEN 650 MG: 325 TABLET ORAL at 17:09

## 2019-09-24 RX ADMIN — SODIUM CHLORIDE, PRESERVATIVE FREE 10 ML: 5 INJECTION INTRAVENOUS at 21:03

## 2019-09-24 RX ADMIN — LABETALOL HYDROCHLORIDE 200 MG: 200 TABLET, FILM COATED ORAL at 06:05

## 2019-09-24 RX ADMIN — LAMOTRIGINE 150 MG: 100 TABLET ORAL at 08:07

## 2019-09-24 RX ADMIN — OXCARBAZEPINE 300 MG: 150 TABLET, FILM COATED ORAL at 21:03

## 2019-09-24 RX ADMIN — NIFEDIPINE 10 MG: 10 CAPSULE ORAL at 23:33

## 2019-09-24 RX ADMIN — ACETAMINOPHEN 650 MG: 325 TABLET ORAL at 02:06

## 2019-09-25 LAB
ALP SERPL-CCNC: 140 U/L (ref 39–117)
ALT SERPL W P-5'-P-CCNC: 12 U/L (ref 1–33)
AST SERPL-CCNC: 12 U/L (ref 1–32)
BILIRUB SERPL-MCNC: <0.2 MG/DL (ref 0.2–1.2)
CREAT BLD-MCNC: 0.53 MG/DL (ref 0.57–1)
DEPRECATED RDW RBC AUTO: 45.6 FL (ref 37–54)
ERYTHROCYTE [DISTWIDTH] IN BLOOD BY AUTOMATED COUNT: 13.5 % (ref 12.3–15.4)
HCT VFR BLD AUTO: 34.7 % (ref 34–46.6)
HGB BLD-MCNC: 11 G/DL (ref 12–15.9)
LDH SERPL-CCNC: 142 U/L (ref 135–214)
MCH RBC QN AUTO: 29 PG (ref 26.6–33)
MCHC RBC AUTO-ENTMCNC: 31.7 G/DL (ref 31.5–35.7)
MCV RBC AUTO: 91.6 FL (ref 79–97)
PLATELET # BLD AUTO: 232 10*3/MM3 (ref 140–450)
PMV BLD AUTO: 11.5 FL (ref 6–12)
RBC # BLD AUTO: 3.79 10*6/MM3 (ref 3.77–5.28)
URATE SERPL-MCNC: 5.5 MG/DL (ref 2.4–5.7)
WBC NRBC COR # BLD: 10.67 10*3/MM3 (ref 3.4–10.8)

## 2019-09-25 PROCEDURE — 84075 ASSAY ALKALINE PHOSPHATASE: CPT | Performed by: OBSTETRICS & GYNECOLOGY

## 2019-09-25 PROCEDURE — 82247 BILIRUBIN TOTAL: CPT | Performed by: OBSTETRICS & GYNECOLOGY

## 2019-09-25 PROCEDURE — 85027 COMPLETE CBC AUTOMATED: CPT | Performed by: OBSTETRICS & GYNECOLOGY

## 2019-09-25 PROCEDURE — 84550 ASSAY OF BLOOD/URIC ACID: CPT | Performed by: OBSTETRICS & GYNECOLOGY

## 2019-09-25 PROCEDURE — 83615 LACTATE (LD) (LDH) ENZYME: CPT | Performed by: OBSTETRICS & GYNECOLOGY

## 2019-09-25 PROCEDURE — 82565 ASSAY OF CREATININE: CPT | Performed by: OBSTETRICS & GYNECOLOGY

## 2019-09-25 PROCEDURE — 84460 ALANINE AMINO (ALT) (SGPT): CPT | Performed by: OBSTETRICS & GYNECOLOGY

## 2019-09-25 PROCEDURE — 84450 TRANSFERASE (AST) (SGOT): CPT | Performed by: OBSTETRICS & GYNECOLOGY

## 2019-09-25 PROCEDURE — 59025 FETAL NON-STRESS TEST: CPT

## 2019-09-25 RX ORDER — NIFEDIPINE 30 MG/1
30 TABLET, EXTENDED RELEASE ORAL 2 TIMES DAILY
Status: DISCONTINUED | OUTPATIENT
Start: 2019-09-25 | End: 2019-10-06

## 2019-09-25 RX ADMIN — LEVOTHYROXINE SODIUM 62.5 MCG: 25 TABLET ORAL at 05:53

## 2019-09-25 RX ADMIN — NIFEDIPINE 30 MG: 30 TABLET, FILM COATED, EXTENDED RELEASE ORAL at 20:41

## 2019-09-25 RX ADMIN — LABETALOL HYDROCHLORIDE 300 MG: 300 TABLET, FILM COATED ORAL at 05:53

## 2019-09-25 RX ADMIN — LABETALOL HYDROCHLORIDE 300 MG: 300 TABLET, FILM COATED ORAL at 14:45

## 2019-09-25 RX ADMIN — LABETALOL HYDROCHLORIDE 300 MG: 300 TABLET, FILM COATED ORAL at 21:34

## 2019-09-25 RX ADMIN — LAMOTRIGINE 150 MG: 100 TABLET ORAL at 08:36

## 2019-09-25 RX ADMIN — OXCARBAZEPINE 300 MG: 150 TABLET, FILM COATED ORAL at 20:41

## 2019-09-25 RX ADMIN — NIFEDIPINE 30 MG: 30 TABLET, FILM COATED, EXTENDED RELEASE ORAL at 08:36

## 2019-09-25 RX ADMIN — SODIUM CHLORIDE, PRESERVATIVE FREE 3 ML: 5 INJECTION INTRAVENOUS at 11:44

## 2019-09-26 ENCOUNTER — APPOINTMENT (OUTPATIENT)
Dept: WOMENS IMAGING | Facility: HOSPITAL | Age: 34
End: 2019-09-26

## 2019-09-26 PROCEDURE — 76819 FETAL BIOPHYS PROFIL W/O NST: CPT

## 2019-09-26 PROCEDURE — 76816 OB US FOLLOW-UP PER FETUS: CPT

## 2019-09-26 PROCEDURE — 76819 FETAL BIOPHYS PROFIL W/O NST: CPT | Performed by: OBSTETRICS & GYNECOLOGY

## 2019-09-26 PROCEDURE — 59025 FETAL NON-STRESS TEST: CPT

## 2019-09-26 PROCEDURE — 76816 OB US FOLLOW-UP PER FETUS: CPT | Performed by: OBSTETRICS & GYNECOLOGY

## 2019-09-26 RX ADMIN — NIFEDIPINE 30 MG: 30 TABLET, FILM COATED, EXTENDED RELEASE ORAL at 21:09

## 2019-09-26 RX ADMIN — NIFEDIPINE 30 MG: 30 TABLET, FILM COATED, EXTENDED RELEASE ORAL at 08:54

## 2019-09-26 RX ADMIN — LABETALOL HYDROCHLORIDE 300 MG: 300 TABLET, FILM COATED ORAL at 13:54

## 2019-09-26 RX ADMIN — LAMOTRIGINE 150 MG: 100 TABLET ORAL at 08:54

## 2019-09-26 RX ADMIN — LABETALOL HYDROCHLORIDE 300 MG: 300 TABLET, FILM COATED ORAL at 06:25

## 2019-09-26 RX ADMIN — LEVOTHYROXINE SODIUM 62.5 MCG: 25 TABLET ORAL at 06:25

## 2019-09-26 RX ADMIN — OXCARBAZEPINE 300 MG: 150 TABLET, FILM COATED ORAL at 21:09

## 2019-09-26 RX ADMIN — LABETALOL HYDROCHLORIDE 300 MG: 300 TABLET, FILM COATED ORAL at 21:09

## 2019-09-27 PROCEDURE — 59025 FETAL NON-STRESS TEST: CPT

## 2019-09-27 RX ORDER — LABETALOL 300 MG/1
300 TABLET, FILM COATED ORAL EVERY 8 HOURS SCHEDULED
Status: COMPLETED | OUTPATIENT
Start: 2019-09-27 | End: 2019-09-28

## 2019-09-27 RX ADMIN — LABETALOL HYDROCHLORIDE 300 MG: 300 TABLET, FILM COATED ORAL at 14:07

## 2019-09-27 RX ADMIN — OXCARBAZEPINE 300 MG: 150 TABLET, FILM COATED ORAL at 21:37

## 2019-09-27 RX ADMIN — LAMOTRIGINE 150 MG: 100 TABLET ORAL at 08:46

## 2019-09-27 RX ADMIN — LABETALOL HYDROCHLORIDE 300 MG: 300 TABLET, FILM COATED ORAL at 06:01

## 2019-09-27 RX ADMIN — LABETALOL HYDROCHLORIDE 300 MG: 300 TABLET, FILM COATED ORAL at 21:37

## 2019-09-27 RX ADMIN — NIFEDIPINE 30 MG: 30 TABLET, FILM COATED, EXTENDED RELEASE ORAL at 08:46

## 2019-09-27 RX ADMIN — NIFEDIPINE 30 MG: 30 TABLET, FILM COATED, EXTENDED RELEASE ORAL at 21:37

## 2019-09-27 RX ADMIN — LEVOTHYROXINE SODIUM 62.5 MCG: 25 TABLET ORAL at 06:00

## 2019-09-28 LAB
ALP SERPL-CCNC: 147 U/L (ref 39–117)
ALT SERPL W P-5'-P-CCNC: 15 U/L (ref 1–33)
AST SERPL-CCNC: 15 U/L (ref 1–32)
BILIRUB SERPL-MCNC: 0.2 MG/DL (ref 0.2–1.2)
CREAT BLD-MCNC: 0.56 MG/DL (ref 0.57–1)
DEPRECATED RDW RBC AUTO: 42.8 FL (ref 37–54)
ERYTHROCYTE [DISTWIDTH] IN BLOOD BY AUTOMATED COUNT: 13.2 % (ref 12.3–15.4)
HCT VFR BLD AUTO: 37.4 % (ref 34–46.6)
HGB BLD-MCNC: 12.1 G/DL (ref 12–15.9)
LDH SERPL-CCNC: 139 U/L (ref 135–214)
MCH RBC QN AUTO: 29.2 PG (ref 26.6–33)
MCHC RBC AUTO-ENTMCNC: 32.4 G/DL (ref 31.5–35.7)
MCV RBC AUTO: 90.1 FL (ref 79–97)
PLATELET # BLD AUTO: 223 10*3/MM3 (ref 140–450)
PMV BLD AUTO: 12.6 FL (ref 6–12)
RBC # BLD AUTO: 4.15 10*6/MM3 (ref 3.77–5.28)
URATE SERPL-MCNC: 6 MG/DL (ref 2.4–5.7)
WBC NRBC COR # BLD: 10.02 10*3/MM3 (ref 3.4–10.8)

## 2019-09-28 PROCEDURE — 82565 ASSAY OF CREATININE: CPT | Performed by: OBSTETRICS & GYNECOLOGY

## 2019-09-28 PROCEDURE — 59025 FETAL NON-STRESS TEST: CPT

## 2019-09-28 PROCEDURE — 82247 BILIRUBIN TOTAL: CPT | Performed by: OBSTETRICS & GYNECOLOGY

## 2019-09-28 PROCEDURE — 83615 LACTATE (LD) (LDH) ENZYME: CPT | Performed by: OBSTETRICS & GYNECOLOGY

## 2019-09-28 PROCEDURE — 84450 TRANSFERASE (AST) (SGOT): CPT | Performed by: OBSTETRICS & GYNECOLOGY

## 2019-09-28 PROCEDURE — 84075 ASSAY ALKALINE PHOSPHATASE: CPT | Performed by: OBSTETRICS & GYNECOLOGY

## 2019-09-28 PROCEDURE — 85027 COMPLETE CBC AUTOMATED: CPT | Performed by: OBSTETRICS & GYNECOLOGY

## 2019-09-28 PROCEDURE — 84460 ALANINE AMINO (ALT) (SGPT): CPT | Performed by: OBSTETRICS & GYNECOLOGY

## 2019-09-28 PROCEDURE — 84550 ASSAY OF BLOOD/URIC ACID: CPT | Performed by: OBSTETRICS & GYNECOLOGY

## 2019-09-28 RX ADMIN — LEVOTHYROXINE SODIUM 62.5 MCG: 25 TABLET ORAL at 06:05

## 2019-09-28 RX ADMIN — NIFEDIPINE 30 MG: 30 TABLET, FILM COATED, EXTENDED RELEASE ORAL at 08:45

## 2019-09-28 RX ADMIN — LABETALOL HYDROCHLORIDE 300 MG: 300 TABLET, FILM COATED ORAL at 14:06

## 2019-09-28 RX ADMIN — LABETALOL HYDROCHLORIDE 300 MG: 300 TABLET, FILM COATED ORAL at 06:02

## 2019-09-28 RX ADMIN — LAMOTRIGINE 150 MG: 100 TABLET ORAL at 08:45

## 2019-09-28 RX ADMIN — OXCARBAZEPINE 300 MG: 150 TABLET, FILM COATED ORAL at 20:00

## 2019-09-28 RX ADMIN — LABETALOL HYDROCHLORIDE 300 MG: 300 TABLET, FILM COATED ORAL at 21:22

## 2019-09-28 RX ADMIN — ACETAMINOPHEN 650 MG: 325 TABLET ORAL at 06:02

## 2019-09-28 RX ADMIN — NIFEDIPINE 30 MG: 30 TABLET, FILM COATED, EXTENDED RELEASE ORAL at 20:00

## 2019-09-29 PROCEDURE — 63710000001 PROMETHAZINE PER 25 MG: Performed by: OBSTETRICS & GYNECOLOGY

## 2019-09-29 PROCEDURE — 59025 FETAL NON-STRESS TEST: CPT

## 2019-09-29 RX ORDER — LABETALOL 300 MG/1
300 TABLET, FILM COATED ORAL EVERY 8 HOURS SCHEDULED
Status: DISCONTINUED | OUTPATIENT
Start: 2019-09-29 | End: 2019-10-03

## 2019-09-29 RX ORDER — PROMETHAZINE HYDROCHLORIDE 25 MG/1
25 TABLET ORAL EVERY 6 HOURS PRN
Status: DISCONTINUED | OUTPATIENT
Start: 2019-09-29 | End: 2019-10-07 | Stop reason: HOSPADM

## 2019-09-29 RX ORDER — LABETALOL 300 MG/1
300 TABLET, FILM COATED ORAL ONCE
Status: COMPLETED | OUTPATIENT
Start: 2019-09-29 | End: 2019-09-29

## 2019-09-29 RX ADMIN — LABETALOL HYDROCHLORIDE 300 MG: 300 TABLET, FILM COATED ORAL at 05:37

## 2019-09-29 RX ADMIN — OXCARBAZEPINE 300 MG: 150 TABLET, FILM COATED ORAL at 20:34

## 2019-09-29 RX ADMIN — LABETALOL HYDROCHLORIDE 300 MG: 300 TABLET, FILM COATED ORAL at 22:37

## 2019-09-29 RX ADMIN — PROMETHAZINE HYDROCHLORIDE 25 MG: 25 TABLET ORAL at 22:36

## 2019-09-29 RX ADMIN — LEVOTHYROXINE SODIUM 62.5 MCG: 25 TABLET ORAL at 05:37

## 2019-09-29 RX ADMIN — LAMOTRIGINE 150 MG: 100 TABLET ORAL at 08:28

## 2019-09-29 RX ADMIN — LABETALOL HYDROCHLORIDE 300 MG: 300 TABLET, FILM COATED ORAL at 13:59

## 2019-09-29 RX ADMIN — NIFEDIPINE 30 MG: 30 TABLET, FILM COATED, EXTENDED RELEASE ORAL at 20:34

## 2019-09-29 RX ADMIN — ACETAMINOPHEN 650 MG: 325 TABLET ORAL at 22:37

## 2019-09-29 RX ADMIN — NIFEDIPINE 30 MG: 30 TABLET, FILM COATED, EXTENDED RELEASE ORAL at 08:28

## 2019-09-29 RX ADMIN — PROMETHAZINE HYDROCHLORIDE 25 MG: 25 TABLET ORAL at 12:26

## 2019-09-30 ENCOUNTER — APPOINTMENT (OUTPATIENT)
Dept: WOMENS IMAGING | Facility: HOSPITAL | Age: 34
End: 2019-09-30

## 2019-09-30 PROCEDURE — 76819 FETAL BIOPHYS PROFIL W/O NST: CPT | Performed by: OBSTETRICS & GYNECOLOGY

## 2019-09-30 PROCEDURE — 63710000001 PROMETHAZINE PER 25 MG: Performed by: OBSTETRICS & GYNECOLOGY

## 2019-09-30 PROCEDURE — 76819 FETAL BIOPHYS PROFIL W/O NST: CPT

## 2019-09-30 PROCEDURE — 59025 FETAL NON-STRESS TEST: CPT

## 2019-09-30 RX ADMIN — LABETALOL HYDROCHLORIDE 300 MG: 300 TABLET, FILM COATED ORAL at 06:27

## 2019-09-30 RX ADMIN — LABETALOL HYDROCHLORIDE 300 MG: 300 TABLET, FILM COATED ORAL at 14:29

## 2019-09-30 RX ADMIN — NIFEDIPINE 30 MG: 30 TABLET, FILM COATED, EXTENDED RELEASE ORAL at 09:19

## 2019-09-30 RX ADMIN — PROMETHAZINE HYDROCHLORIDE 25 MG: 25 TABLET ORAL at 22:20

## 2019-09-30 RX ADMIN — LAMOTRIGINE 150 MG: 100 TABLET ORAL at 09:19

## 2019-09-30 RX ADMIN — OXCARBAZEPINE 300 MG: 150 TABLET, FILM COATED ORAL at 21:28

## 2019-09-30 RX ADMIN — NIFEDIPINE 30 MG: 30 TABLET, FILM COATED, EXTENDED RELEASE ORAL at 21:28

## 2019-09-30 RX ADMIN — LEVOTHYROXINE SODIUM 62.5 MCG: 25 TABLET ORAL at 06:28

## 2019-09-30 RX ADMIN — LABETALOL HYDROCHLORIDE 300 MG: 300 TABLET, FILM COATED ORAL at 22:20

## 2019-10-01 PROCEDURE — 59025 FETAL NON-STRESS TEST: CPT

## 2019-10-01 RX ADMIN — LABETALOL HYDROCHLORIDE 300 MG: 300 TABLET, FILM COATED ORAL at 22:07

## 2019-10-01 RX ADMIN — LABETALOL HYDROCHLORIDE 300 MG: 300 TABLET, FILM COATED ORAL at 14:52

## 2019-10-01 RX ADMIN — OXCARBAZEPINE 300 MG: 150 TABLET, FILM COATED ORAL at 20:30

## 2019-10-01 RX ADMIN — LEVOTHYROXINE SODIUM 62.5 MCG: 25 TABLET ORAL at 06:16

## 2019-10-01 RX ADMIN — LABETALOL HYDROCHLORIDE 300 MG: 300 TABLET, FILM COATED ORAL at 06:16

## 2019-10-01 RX ADMIN — NIFEDIPINE 30 MG: 30 TABLET, FILM COATED, EXTENDED RELEASE ORAL at 09:06

## 2019-10-01 RX ADMIN — NIFEDIPINE 30 MG: 30 TABLET, FILM COATED, EXTENDED RELEASE ORAL at 20:30

## 2019-10-01 RX ADMIN — LAMOTRIGINE 150 MG: 100 TABLET ORAL at 09:06

## 2019-10-01 NOTE — PROGRESS NOTES
10/1/2019  HD:13  34 y.o. yo female  at 31w2d    Subjective   Bianca has no complaints.    BPs over last 24 hours all normal/mild range. No severe.          Objective   Temp: Temp:  [97.9 °F (36.6 °C)-98.5 °F (36.9 °C)] 98.5 °F (36.9 °C) Temp src: Oral   BP: BP: (126-148)/(74-83) 132/83        Pulse: Heart Rate:  [77-97] 97  RR: Resp:  [15-16] 15    General:  nad   Abdomen: Gravid, nontender         Lab Results   Component Value Date    WBC 10.02 2019    HGB 12.1 2019    HCT 37.4 2019    MCV 90.1 2019     2019    HEPBSAG Negative 2016     Results from last 7 days   Lab Units 19  0801 19  0720   AST (SGOT) U/L 15 12     Results from last 7 days   Lab Units 19  0801 19  0720   ALT (SGPT) U/L 15 12      Results from last 7 days   Lab Units 19  0801 19  0720   CREATININE mg/dL 0.56* 0.53*      Results from last 7 days   Lab Units 19  0801 19  0720   BILIRUBIN mg/dL 0.2 <0.2*     NST reactive.     Assessment  1.   34 y.o. yo female  at 31w2d  2. Preeclampsia, bps stable on current regimen.     Plan  1. Cont current hospital care.   2. Twice weekly bpp/ labs    This note has been electronically signed.    Monique Dasilva MD  2019

## 2019-10-02 LAB
ALP SERPL-CCNC: 145 U/L (ref 39–117)
ALT SERPL W P-5'-P-CCNC: 12 U/L (ref 1–33)
AST SERPL-CCNC: 14 U/L (ref 1–32)
BILIRUB SERPL-MCNC: <0.2 MG/DL (ref 0.2–1.2)
CREAT BLD-MCNC: 0.56 MG/DL (ref 0.57–1)
DEPRECATED RDW RBC AUTO: 42.5 FL (ref 37–54)
ERYTHROCYTE [DISTWIDTH] IN BLOOD BY AUTOMATED COUNT: 13.1 % (ref 12.3–15.4)
HCT VFR BLD AUTO: 35.1 % (ref 34–46.6)
HGB BLD-MCNC: 11.6 G/DL (ref 12–15.9)
LDH SERPL-CCNC: 166 U/L (ref 135–214)
MCH RBC QN AUTO: 29.7 PG (ref 26.6–33)
MCHC RBC AUTO-ENTMCNC: 33 G/DL (ref 31.5–35.7)
MCV RBC AUTO: 89.8 FL (ref 79–97)
PLATELET # BLD AUTO: 200 10*3/MM3 (ref 140–450)
PMV BLD AUTO: 12.3 FL (ref 6–12)
RBC # BLD AUTO: 3.91 10*6/MM3 (ref 3.77–5.28)
URATE SERPL-MCNC: 6.3 MG/DL (ref 2.4–5.7)
WBC NRBC COR # BLD: 11.27 10*3/MM3 (ref 3.4–10.8)

## 2019-10-02 PROCEDURE — 84450 TRANSFERASE (AST) (SGOT): CPT | Performed by: OBSTETRICS & GYNECOLOGY

## 2019-10-02 PROCEDURE — 84550 ASSAY OF BLOOD/URIC ACID: CPT | Performed by: OBSTETRICS & GYNECOLOGY

## 2019-10-02 PROCEDURE — 84075 ASSAY ALKALINE PHOSPHATASE: CPT | Performed by: OBSTETRICS & GYNECOLOGY

## 2019-10-02 PROCEDURE — 83615 LACTATE (LD) (LDH) ENZYME: CPT | Performed by: OBSTETRICS & GYNECOLOGY

## 2019-10-02 PROCEDURE — 82565 ASSAY OF CREATININE: CPT | Performed by: OBSTETRICS & GYNECOLOGY

## 2019-10-02 PROCEDURE — 59025 FETAL NON-STRESS TEST: CPT

## 2019-10-02 PROCEDURE — 82247 BILIRUBIN TOTAL: CPT | Performed by: OBSTETRICS & GYNECOLOGY

## 2019-10-02 PROCEDURE — 85027 COMPLETE CBC AUTOMATED: CPT | Performed by: OBSTETRICS & GYNECOLOGY

## 2019-10-02 PROCEDURE — 84460 ALANINE AMINO (ALT) (SGPT): CPT | Performed by: OBSTETRICS & GYNECOLOGY

## 2019-10-02 PROCEDURE — 59020 FETAL CONTRACT STRESS TEST: CPT

## 2019-10-02 RX ADMIN — LABETALOL HYDROCHLORIDE 300 MG: 300 TABLET, FILM COATED ORAL at 22:14

## 2019-10-02 RX ADMIN — NIFEDIPINE 30 MG: 30 TABLET, FILM COATED, EXTENDED RELEASE ORAL at 20:25

## 2019-10-02 RX ADMIN — LAMOTRIGINE 150 MG: 100 TABLET ORAL at 08:51

## 2019-10-02 RX ADMIN — LABETALOL HYDROCHLORIDE 300 MG: 300 TABLET, FILM COATED ORAL at 06:13

## 2019-10-02 RX ADMIN — NIFEDIPINE 30 MG: 30 TABLET, FILM COATED, EXTENDED RELEASE ORAL at 08:51

## 2019-10-02 RX ADMIN — OXCARBAZEPINE 300 MG: 150 TABLET, FILM COATED ORAL at 20:25

## 2019-10-02 RX ADMIN — LEVOTHYROXINE SODIUM 62.5 MCG: 25 TABLET ORAL at 06:13

## 2019-10-02 RX ADMIN — LABETALOL HYDROCHLORIDE 300 MG: 300 TABLET, FILM COATED ORAL at 14:10

## 2019-10-02 NOTE — PLAN OF CARE
Problem: Patient Care Overview  Goal: Plan of Care Review  Outcome: Ongoing (interventions implemented as appropriate)   10/02/19 0934   Coping/Psychosocial   Plan of Care Reviewed With patient   Plan of Care Review   Progress no change     Goal: Individualization and Mutuality  Outcome: Ongoing (interventions implemented as appropriate)    Goal: Discharge Needs Assessment  Outcome: Ongoing (interventions implemented as appropriate)    Goal: Interprofessional Rounds/Family Conf  Outcome: Ongoing (interventions implemented as appropriate)      Problem: Hypertensive Disorders in Pregnancy (Adult,Obstetrics,Pediatric)  Goal: Signs and Symptoms of Listed Potential Problems Will be Absent, Minimized or Managed (Hypertensive Disorders in Pregnancy)  Outcome: Ongoing (interventions implemented as appropriate)

## 2019-10-02 NOTE — PROGRESS NOTES
10/2/2019  HD:14  34 y.o. yo female  at 31w3d    Subjective   Bianca has no complaints. No headaches.      Labs stable this morning, uric acid 6.3       Objective   Temp: Temp:  [97.8 °F (36.6 °C)-98.2 °F (36.8 °C)] 98.2 °F (36.8 °C) Temp src: Oral   BP: BP: (130-168)/(73-86) 139/83        Pulse: Heart Rate:  [] 86  RR: Resp:  [15-16] 16    General:  nad   Abdomen: Gravid, nontender         Lab Results   Component Value Date    WBC 11.27 (H) 10/02/2019    HGB 11.6 (L) 10/02/2019    HCT 35.1 10/02/2019    MCV 89.8 10/02/2019     10/02/2019    HEPBSAG Negative 2016     Results from last 7 days   Lab Units 10/02/19  0549 19  0801   AST (SGOT) U/L 14 15     Results from last 7 days   Lab Units 10/02/19  0549 19  0801   ALT (SGPT) U/L 12 15      Results from last 7 days   Lab Units 10/02/19  0549 19  0801   CREATININE mg/dL 0.56* 0.56*      Results from last 7 days   Lab Units 10/02/19  0549 19  0801   BILIRUBIN mg/dL <0.2* 0.2     nst reactivbe      Assessment  1.   34 y.o. yo female  at 31w3d  2. Preeclampsia, s/p steroids.     Plan  1. Cont current hospital care. Will be inpatient until delivery.   2.  Cont twice weekly labs/ BPP        This note has been electronically signed.    Monique Dasilva MD  2019

## 2019-10-03 ENCOUNTER — APPOINTMENT (OUTPATIENT)
Dept: WOMENS IMAGING | Facility: HOSPITAL | Age: 34
End: 2019-10-03

## 2019-10-03 PROCEDURE — 76819 FETAL BIOPHYS PROFIL W/O NST: CPT

## 2019-10-03 PROCEDURE — 59025 FETAL NON-STRESS TEST: CPT

## 2019-10-03 PROCEDURE — 25010000002 BETAMETHASONE ACET & SOD PHOS PER 4 MG: Performed by: OBSTETRICS & GYNECOLOGY

## 2019-10-03 PROCEDURE — 76819 FETAL BIOPHYS PROFIL W/O NST: CPT | Performed by: OBSTETRICS & GYNECOLOGY

## 2019-10-03 RX ORDER — LABETALOL 200 MG/1
400 TABLET, FILM COATED ORAL EVERY 8 HOURS SCHEDULED
Status: DISCONTINUED | OUTPATIENT
Start: 2019-10-03 | End: 2019-10-06

## 2019-10-03 RX ORDER — BETAMETHASONE SODIUM PHOSPHATE AND BETAMETHASONE ACETATE 3; 3 MG/ML; MG/ML
12 INJECTION, SUSPENSION INTRA-ARTICULAR; INTRALESIONAL; INTRAMUSCULAR; SOFT TISSUE EVERY 24 HOURS
Status: COMPLETED | OUTPATIENT
Start: 2019-10-03 | End: 2019-10-04

## 2019-10-03 RX ADMIN — NIFEDIPINE 30 MG: 30 TABLET, FILM COATED, EXTENDED RELEASE ORAL at 09:55

## 2019-10-03 RX ADMIN — LABETALOL HYDROCHLORIDE 300 MG: 300 TABLET, FILM COATED ORAL at 05:43

## 2019-10-03 RX ADMIN — LAMOTRIGINE 150 MG: 100 TABLET ORAL at 09:55

## 2019-10-03 RX ADMIN — LABETALOL HYDROCHLORIDE 300 MG: 300 TABLET, FILM COATED ORAL at 14:45

## 2019-10-03 RX ADMIN — OXCARBAZEPINE 300 MG: 150 TABLET, FILM COATED ORAL at 20:01

## 2019-10-03 RX ADMIN — NIFEDIPINE 30 MG: 30 TABLET, FILM COATED, EXTENDED RELEASE ORAL at 20:00

## 2019-10-03 RX ADMIN — BETAMETHASONE SODIUM PHOSPHATE AND BETAMETHASONE ACETATE 12 MG: 3; 3 INJECTION, SUSPENSION INTRA-ARTICULAR; INTRALESIONAL; INTRAMUSCULAR at 21:08

## 2019-10-03 RX ADMIN — LABETALOL HYDROCHLORIDE 400 MG: 200 TABLET, FILM COATED ORAL at 21:07

## 2019-10-03 RX ADMIN — LEVOTHYROXINE SODIUM 62.5 MCG: 25 TABLET ORAL at 05:43

## 2019-10-03 NOTE — PROGRESS NOTES
Daily Progress Note    Patient name: Bianca Mercado  YOB: 1985   MRN: [id number]  Referring Provider: Monique Dasilva  Admission Date: 2019  Date of Service: 10/3/2019    Bianca Mercado is a 34 y.o.    at 31w4d  admitted on 2019 for <principal problem not specified>    Hospital day 15    Diagnoses:   Patient Active Problem List   Diagnosis   • Elderly primigravida in second trimester   • Pregnancy   • Marginal placenta previa   • Echogenic focus of heart of fetus affecting antepartum care of mother   • Preeclampsia       Prenatal Labs  Lab Results   Component Value Date    HGB 11.6 (L) 10/02/2019    HEPBSAG Negative 2016    ABO O 2019    RH Negative 2019    ABSCRN Positive 2019       Subjective:      Bianca has no complaints today.  Reports fetal movement is normal  No contractions  Denies leakage of amniotic fluid.  Denies vaginal bleeding    Objective:     Vital signs:  Vital Signs Range for the last 24 hours  Temperature: Temp:  [97.7 °F (36.5 °C)-98.3 °F (36.8 °C)] 97.8 °F (36.6 °C)   Temp Source: Temp src: Oral   BP: BP: (127-160)/(69-88) 145/74   Pulse: Heart Rate:  [] 80   Respirations: Resp:  [16] 16   Weight: 94.8 kg (209 lb)     General: Alert & oriented x4, in no apparent distress  HEENT: Grossly normal  Resp: Unlabored breathing  Abdomen: Soft, nontender  Uterus: Gravid, nontender  Extremities: Nontender, no pain, no edema   Neurologic:  Alert & oriented x 4,  no focal deficits noted  Psychiatric:  Speech and behavior appropriate     Non-Stress Test:  Fetal Heart Rate Assessment   Method: Fetal HR Assessment Method: external   Beats/min: Fetal HR (beats/min): 145   Baseline: Fetal Heart Baseline Rate: normal range   Varibility: Fetal HR Variability: moderate (amplitude range 6 to 25 bpm)   Accels: Fetal HR Accelerations: greater than/equal to 10 bpm (32 wks gest or less)   Decels: Fetal HR Decelerations: absent   Tracing Category:        Uterine Assessment   Method: Method: external tocotransducer   Frequency (min): Contraction Frequency (Minutes): x1   Ctx Count in 10 min:     Duration:     Intensity: Contraction Intensity: no contractions   Intensity by IUPC:     Resting Tone: Uterine Resting Tone: soft by palpation   Resting Tone by IUPC:     Ranjana Units:       Cervix: Exam by:     Dilation:     Effacement:     Station:         Most recent ultrasound:  Patient is getting ultrasound today it is pending    Medications:    labetalol 300 mg Oral Q8H   lamoTRIgine 150 mg Oral Daily   levothyroxine 62.5 mcg Oral Q AM   NIFEdipine XL 30 mg Oral BID   OXcarbazepine 300 mg Oral Nightly      •  acetaminophen  •  influenza vaccine  •  promethazine    Labs:  Lab Results   Component Value Date    WBC 11.27 (H) 10/02/2019    HGB 11.6 (L) 10/02/2019    HCT 35.1 10/02/2019    MCV 89.8 10/02/2019     10/02/2019    URICACID 6.3 (H) 10/02/2019    AST 14 10/02/2019    ALT 12 10/02/2019     10/02/2019           Assessment/Plan:      Bianca is a 34 y.o.    at 31w4d with <principal problem not specified>    Hospital Problem List     Preeclampsia    Hypertension is unchanged.  Continue current treatment regimen.  Blood pressure will be reassessed .            I spent 15 minutes with this patient of which greater than 50% was face to face counseling and coordination of care.  All questions were answered to the best of my ability.    Daniela Bond MD  10/3/2019 8:53 AM

## 2019-10-04 PROCEDURE — 63710000001 PROMETHAZINE PER 25 MG: Performed by: OBSTETRICS & GYNECOLOGY

## 2019-10-04 PROCEDURE — 59025 FETAL NON-STRESS TEST: CPT

## 2019-10-04 PROCEDURE — 25010000002 BETAMETHASONE ACET & SOD PHOS PER 4 MG: Performed by: OBSTETRICS & GYNECOLOGY

## 2019-10-04 RX ADMIN — PROMETHAZINE HYDROCHLORIDE 25 MG: 25 TABLET ORAL at 02:44

## 2019-10-04 RX ADMIN — LABETALOL HYDROCHLORIDE 400 MG: 200 TABLET, FILM COATED ORAL at 14:23

## 2019-10-04 RX ADMIN — OXCARBAZEPINE 300 MG: 150 TABLET, FILM COATED ORAL at 20:28

## 2019-10-04 RX ADMIN — LABETALOL HYDROCHLORIDE 400 MG: 200 TABLET, FILM COATED ORAL at 06:07

## 2019-10-04 RX ADMIN — NIFEDIPINE 30 MG: 30 TABLET, FILM COATED, EXTENDED RELEASE ORAL at 20:28

## 2019-10-04 RX ADMIN — LABETALOL HYDROCHLORIDE 400 MG: 200 TABLET, FILM COATED ORAL at 22:12

## 2019-10-04 RX ADMIN — BETAMETHASONE SODIUM PHOSPHATE AND BETAMETHASONE ACETATE 12 MG: 3; 3 INJECTION, SUSPENSION INTRA-ARTICULAR; INTRALESIONAL; INTRAMUSCULAR at 22:12

## 2019-10-04 RX ADMIN — LEVOTHYROXINE SODIUM 62.5 MCG: 25 TABLET ORAL at 06:06

## 2019-10-04 RX ADMIN — LAMOTRIGINE 150 MG: 100 TABLET ORAL at 09:02

## 2019-10-04 RX ADMIN — NIFEDIPINE 30 MG: 30 TABLET, FILM COATED, EXTENDED RELEASE ORAL at 09:02

## 2019-10-04 NOTE — PROGRESS NOTES
Daily Progress Note    Patient name: Bianca Mercado  YOB: 1985   MRN: [id number]  Referring Provider: Monique Dasilva  Admission Date: 2019  Date of Service: 10/4/2019    Bianca Mercado is a 34 y.o.    at 31w5d  admitted on 2019 for <principal problem not specified>    Hospital day 16    Diagnoses:   Patient Active Problem List   Diagnosis   • Elderly primigravida in second trimester   • Pregnancy   • Marginal placenta previa   • Echogenic focus of heart of fetus affecting antepartum care of mother   • Preeclampsia       Prenatal Labs  Lab Results   Component Value Date    HGB 11.6 (L) 10/02/2019    HEPBSAG Negative 2016    ABO O 2019    RH Negative 2019    ABSCRN Positive 2019       Subjective:      Bianca has no complaints today.  Reports fetal movement is normal  No contractions  Denies leakage of amniotic fluid.  Denies vaginal bleeding    Objective:     Vital signs:  Vital Signs Range for the last 24 hours  Temperature: Temp:  [97.6 °F (36.4 °C)-98.1 °F (36.7 °C)] 97.9 °F (36.6 °C)   Temp Source: Temp src: Oral   BP: BP: (130-183)/(71-96) 139/78   Pulse: Heart Rate:  [80-92] 87   Respirations: Resp:  [18] 18   Weight: 94.8 kg (209 lb)     General: Alert & oriented x4, in no apparent distress  HEENT: Grossly normal  Resp: Unlabored breathing  Abdomen: Soft, nontender  Uterus: Gravid, nontender  Extremities: Nontender, no pain, no edema   Neurologic:  Alert & oriented x 4,  no focal deficits noted  Psychiatric:  Speech and behavior appropriate     Non-Stress Test:  Fetal Heart Rate Assessment   Method: Fetal HR Assessment Method: external   Beats/min: Fetal HR (beats/min): 140   Baseline: Fetal Heart Baseline Rate: normal range   Varibility: Fetal HR Variability: moderate (amplitude range 6 to 25 bpm)   Accels: Fetal HR Accelerations: greater than/equal to 15 bpm, lasting at least 15 seconds   Decels: Fetal HR Decelerations: variable   Tracing  Category:       Uterine Assessment   Method: Method: external tocotransducer   Frequency (min): Contraction Frequency (Minutes): x1   Ctx Count in 10 min:     Duration:     Intensity: Contraction Intensity: no contractions   Intensity by IUPC:     Resting Tone: Uterine Resting Tone: soft by palpation   Resting Tone by IUPC:     Ranjana Units:       Cervix: Exam by:     Dilation:     Effacement:     Station:         Most recent ultrasound:      Medications:    betamethasone acetate-betamethasone sodium phosphate 12 mg Intramuscular Q24H   labetalol 400 mg Oral Q8H   lamoTRIgine 150 mg Oral Daily   levothyroxine 62.5 mcg Oral Q AM   NIFEdipine XL 30 mg Oral BID   OXcarbazepine 300 mg Oral Nightly      •  acetaminophen  •  influenza vaccine  •  promethazine    Labs:  Lab Results   Component Value Date    WBC 11.27 (H) 10/02/2019    HGB 11.6 (L) 10/02/2019    HCT 35.1 10/02/2019    MCV 89.8 10/02/2019     10/02/2019    URICACID 6.3 (H) 10/02/2019    AST 14 10/02/2019    ALT 12 10/02/2019     10/02/2019           Assessment/Plan:      Bianca is a 34 y.o.    at 31w5d with <principal problem not specified>    Hospital Problem List     Preeclampsia    Hypertension is unchanged.    Blood pressure will be reassessed .        Patient's blood pressure spiked last night.  Second dose of steroid was ordered and the second dose is tonight.  Patient's labetalol was increased to 400 3 times daily.  I spent 15 minutes with this patient of which greater than 50% was face to face counseling and coordination of care.  All questions were answered to the best of my ability.    Daniela Bond MD  10/4/2019 8:48 AM

## 2019-10-04 NOTE — PROGRESS NOTES
Patient's blood pressure spiked last night.  She was given a second round of steroids.  Last dose is given tonight.  Her labetalol was increased to 400 3 times daily.  We will order labs for in the morning.  Her ultrasound was normal on Thursday.  Baby continues to look normal.

## 2019-10-04 NOTE — PROGRESS NOTES
Labourist:      Ms. Mercado has another run of severe range blood pressures.  She is asymptomatic and her recent lab are jillian as are her fetal scan and dopplers.    Plan:    1.  Increase her Labetalol to 400 mg TID  2.  Start rescue course of steroids given it has been over 2 weeks since her last course.  3.  Dr. Bond (covering for Dr. Dasilva) aware and agrees.

## 2019-10-05 LAB
ALP SERPL-CCNC: 158 U/L (ref 39–117)
ALT SERPL W P-5'-P-CCNC: 15 U/L (ref 1–33)
AST SERPL-CCNC: 16 U/L (ref 1–32)
BILIRUB SERPL-MCNC: <0.2 MG/DL (ref 0.2–1.2)
CREAT BLD-MCNC: 0.57 MG/DL (ref 0.57–1)
DEPRECATED RDW RBC AUTO: 42.8 FL (ref 37–54)
ERYTHROCYTE [DISTWIDTH] IN BLOOD BY AUTOMATED COUNT: 13.1 % (ref 12.3–15.4)
HCT VFR BLD AUTO: 35.2 % (ref 34–46.6)
HGB BLD-MCNC: 11.5 G/DL (ref 12–15.9)
LDH SERPL-CCNC: 168 U/L (ref 135–214)
MCH RBC QN AUTO: 29.1 PG (ref 26.6–33)
MCHC RBC AUTO-ENTMCNC: 32.7 G/DL (ref 31.5–35.7)
MCV RBC AUTO: 89.1 FL (ref 79–97)
PLATELET # BLD AUTO: 239 10*3/MM3 (ref 140–450)
PMV BLD AUTO: 12.5 FL (ref 6–12)
RBC # BLD AUTO: 3.95 10*6/MM3 (ref 3.77–5.28)
URATE SERPL-MCNC: 6.5 MG/DL (ref 2.4–5.7)
WBC NRBC COR # BLD: 14.13 10*3/MM3 (ref 3.4–10.8)

## 2019-10-05 PROCEDURE — 84460 ALANINE AMINO (ALT) (SGPT): CPT | Performed by: OBSTETRICS & GYNECOLOGY

## 2019-10-05 PROCEDURE — 82247 BILIRUBIN TOTAL: CPT | Performed by: OBSTETRICS & GYNECOLOGY

## 2019-10-05 PROCEDURE — 84075 ASSAY ALKALINE PHOSPHATASE: CPT | Performed by: OBSTETRICS & GYNECOLOGY

## 2019-10-05 PROCEDURE — 59025 FETAL NON-STRESS TEST: CPT

## 2019-10-05 PROCEDURE — 83615 LACTATE (LD) (LDH) ENZYME: CPT | Performed by: OBSTETRICS & GYNECOLOGY

## 2019-10-05 PROCEDURE — 84550 ASSAY OF BLOOD/URIC ACID: CPT | Performed by: OBSTETRICS & GYNECOLOGY

## 2019-10-05 PROCEDURE — 82565 ASSAY OF CREATININE: CPT | Performed by: OBSTETRICS & GYNECOLOGY

## 2019-10-05 PROCEDURE — 85027 COMPLETE CBC AUTOMATED: CPT | Performed by: OBSTETRICS & GYNECOLOGY

## 2019-10-05 PROCEDURE — 84450 TRANSFERASE (AST) (SGOT): CPT | Performed by: OBSTETRICS & GYNECOLOGY

## 2019-10-05 RX ORDER — NIFEDIPINE 10 MG/1
10 CAPSULE ORAL ONCE
Status: COMPLETED | OUTPATIENT
Start: 2019-10-05 | End: 2019-10-05

## 2019-10-05 RX ADMIN — LABETALOL HYDROCHLORIDE 400 MG: 200 TABLET, FILM COATED ORAL at 05:56

## 2019-10-05 RX ADMIN — LAMOTRIGINE 150 MG: 100 TABLET ORAL at 08:23

## 2019-10-05 RX ADMIN — OXCARBAZEPINE 300 MG: 150 TABLET, FILM COATED ORAL at 20:02

## 2019-10-05 RX ADMIN — LEVOTHYROXINE SODIUM 62.5 MCG: 25 TABLET ORAL at 05:56

## 2019-10-05 RX ADMIN — LABETALOL HYDROCHLORIDE 400 MG: 200 TABLET, FILM COATED ORAL at 21:31

## 2019-10-05 RX ADMIN — NIFEDIPINE 30 MG: 30 TABLET, FILM COATED, EXTENDED RELEASE ORAL at 20:02

## 2019-10-05 RX ADMIN — LABETALOL HYDROCHLORIDE 400 MG: 200 TABLET, FILM COATED ORAL at 13:28

## 2019-10-05 RX ADMIN — NIFEDIPINE 10 MG: 10 CAPSULE ORAL at 20:25

## 2019-10-05 RX ADMIN — NIFEDIPINE 30 MG: 30 TABLET, FILM COATED, EXTENDED RELEASE ORAL at 08:23

## 2019-10-05 NOTE — PLAN OF CARE
Problem: Patient Care Overview  Goal: Plan of Care Review  Outcome: Ongoing (interventions implemented as appropriate)   10/05/19 0600   Coping/Psychosocial   Plan of Care Reviewed With patient   Plan of Care Review   Progress improving   OTHER   Outcome Summary VSS, no complaints this shift       Problem: Hypertensive Disorders in Pregnancy (Adult,Obstetrics,Pediatric)  Goal: Signs and Symptoms of Listed Potential Problems Will be Absent, Minimized or Managed (Hypertensive Disorders in Pregnancy)  Outcome: Ongoing (interventions implemented as appropriate)   10/05/19 0600   Goal/Outcome Evaluation   Problems Assessed (Hypertensive Disorders in Pregnancy) all   Problems Present (Hypertensive/in PG) none

## 2019-10-05 NOTE — PROGRESS NOTES
34-year-old  A2 at 31 weeks and 6 days  She with gestational hypertension  Blood pressure is good today, currently on labetalol 403 times a day  Labs have been reviewed  NSTs are reactive with good fetal movement  We will continue inpatient care  We do have a little room to go up on her labetalol if needed

## 2019-10-06 PROCEDURE — 86900 BLOOD TYPING SEROLOGIC ABO: CPT | Performed by: OBSTETRICS & GYNECOLOGY

## 2019-10-06 PROCEDURE — 86850 RBC ANTIBODY SCREEN: CPT | Performed by: OBSTETRICS & GYNECOLOGY

## 2019-10-06 PROCEDURE — 63710000001 PROMETHAZINE PER 25 MG: Performed by: OBSTETRICS & GYNECOLOGY

## 2019-10-06 PROCEDURE — 59025 FETAL NON-STRESS TEST: CPT

## 2019-10-06 PROCEDURE — 86901 BLOOD TYPING SEROLOGIC RH(D): CPT | Performed by: OBSTETRICS & GYNECOLOGY

## 2019-10-06 PROCEDURE — 86870 RBC ANTIBODY IDENTIFICATION: CPT | Performed by: OBSTETRICS & GYNECOLOGY

## 2019-10-06 RX ORDER — LABETALOL 300 MG/1
600 TABLET, FILM COATED ORAL EVERY 8 HOURS SCHEDULED
Status: DISCONTINUED | OUTPATIENT
Start: 2019-10-06 | End: 2019-10-07

## 2019-10-06 RX ORDER — LABETALOL 200 MG/1
200 TABLET, FILM COATED ORAL ONCE
Status: COMPLETED | OUTPATIENT
Start: 2019-10-06 | End: 2019-10-06

## 2019-10-06 RX ADMIN — LABETALOL HYDROCHLORIDE 600 MG: 300 TABLET, FILM COATED ORAL at 06:56

## 2019-10-06 RX ADMIN — LABETALOL HYDROCHLORIDE 200 MG: 200 TABLET, FILM COATED ORAL at 00:27

## 2019-10-06 RX ADMIN — LAMOTRIGINE 150 MG: 100 TABLET ORAL at 08:20

## 2019-10-06 RX ADMIN — OXCARBAZEPINE 300 MG: 150 TABLET, FILM COATED ORAL at 21:42

## 2019-10-06 RX ADMIN — PROMETHAZINE HYDROCHLORIDE 25 MG: 25 TABLET ORAL at 21:42

## 2019-10-06 RX ADMIN — NIFEDIPINE 30 MG: 30 TABLET, FILM COATED, EXTENDED RELEASE ORAL at 08:16

## 2019-10-06 RX ADMIN — LEVOTHYROXINE SODIUM 62.5 MCG: 25 TABLET ORAL at 06:59

## 2019-10-06 RX ADMIN — LABETALOL HYDROCHLORIDE 600 MG: 300 TABLET, FILM COATED ORAL at 21:42

## 2019-10-06 RX ADMIN — LABETALOL HYDROCHLORIDE 600 MG: 300 TABLET, FILM COATED ORAL at 14:05

## 2019-10-06 NOTE — PROGRESS NOTES
Daily Progress Note    Patient name: Bianca Mercado  YOB: 1985   MRN: [id number]  Referring Provider: Monique Dasilva  Admission Date: 2019  Date of Service: 10/6/2019    Bianca Mercado is a 34 y.o.    at 32w0d  admitted on 2019 for   preeclampsia  Hospital day 18    Diagnoses:   Patient Active Problem List   Diagnosis   • Elderly primigravida in second trimester   • Pregnancy   • Marginal placenta previa   • Echogenic focus of heart of fetus affecting antepartum care of mother   • Preeclampsia       Prenatal Labs  Lab Results   Component Value Date    HGB 11.5 (L) 10/05/2019    HEPBSAG Negative 2016    ABO O 2019    RH Negative 2019    ABSCRN Positive 2019       Subjective:      Bianca has no complaints today.  Reports fetal movement is normal  No contractions, Rare contractions  Denies leakage of amniotic fluid.  Denies vaginal bleeding    Objective:     Vital signs:  Vital Signs Range for the last 24 hours  Temperature: Temp:  [97.7 °F (36.5 °C)-98.6 °F (37 °C)] 97.7 °F (36.5 °C)   Temp Source: Temp src: Oral   BP: BP: (130-169)/(72-84) 142/74   Pulse: Heart Rate:  [71-96] 82   Respirations: Resp:  [14-16] 16   Weight: 94.8 kg (209 lb)     General: Alert & oriented x4, in no apparent distress  HEENT: Grossly normal  Resp: Unlabored breathing  Abdomen: Soft, nontender  Uterus: Gravid, nontender  Extremities: Nontender, no pain, no edema   Neurologic:  Alert & oriented x 4,  no focal deficits noted  Psychiatric:  Speech and behavior appropriate     Non-Stress Test:  Fetal Heart Rate Assessment   Method: Fetal HR Assessment Method: external   Beats/min: Fetal HR (beats/min): 140   Baseline: Fetal Heart Baseline Rate: normal range   Varibility: Fetal HR Variability: moderate (amplitude range 6 to 25 bpm)   Accels: Fetal HR Accelerations: absent   Decels: Fetal HR Decelerations: absent   Tracing Category:       Uterine Assessment   Method: Method:  external tocotransducer   Frequency (min): Contraction Frequency (Minutes): x1   Ctx Count in 10 min:     Duration:     Intensity: Contraction Intensity: no contractions   Intensity by IUPC:     Resting Tone: Uterine Resting Tone: soft by palpation   Resting Tone by IUPC:     Ranjana Units:       Cervix: Exam by:     Dilation:     Effacement:     Station:         Most recent ultrasound:  PDC this admission    Medications:    labetalol 600 mg Oral Q8H   lamoTRIgine 150 mg Oral Daily   levothyroxine 62.5 mcg Oral Q AM   OXcarbazepine 300 mg Oral Nightly      •  acetaminophen  •  influenza vaccine  •  promethazine    Labs:  Lab Results   Component Value Date    WBC 14.13 (H) 10/05/2019    HGB 11.5 (L) 10/05/2019    HCT 35.2 10/05/2019    MCV 89.1 10/05/2019     10/05/2019    URICACID 6.5 (H) 10/05/2019    AST 16 10/05/2019    ALT 15 10/05/2019     10/05/2019           Assessment/Plan:      Bianca is a 34 y.o.    at 32w0d with <principal problem not specified>    Hospital Problem List     Preeclampsia    Hypertension is unchanged.  Medication changes per orders.  .            Repeat PDC US in AM    Miguel Edmonds MD  10/6/2019 10:34 AM

## 2019-10-07 ENCOUNTER — APPOINTMENT (OUTPATIENT)
Dept: WOMENS IMAGING | Facility: HOSPITAL | Age: 34
End: 2019-10-07

## 2019-10-07 ENCOUNTER — ANESTHESIA (OUTPATIENT)
Dept: LABOR AND DELIVERY | Facility: HOSPITAL | Age: 34
End: 2019-10-07

## 2019-10-07 ENCOUNTER — ANESTHESIA EVENT (OUTPATIENT)
Dept: LABOR AND DELIVERY | Facility: HOSPITAL | Age: 34
End: 2019-10-07

## 2019-10-07 PROBLEM — O44.20 MARGINAL PLACENTA PREVIA: Status: RESOLVED | Noted: 2019-07-03 | Resolved: 2019-10-07

## 2019-10-07 LAB
ALP SERPL-CCNC: 147 U/L (ref 39–117)
ALT SERPL W P-5'-P-CCNC: 15 U/L (ref 1–33)
AST SERPL-CCNC: 16 U/L (ref 1–32)
ATMOSPHERIC PRESS: ABNORMAL MM[HG]
ATMOSPHERIC PRESS: ABNORMAL MM[HG]
BASE EXCESS BLDCOA CALC-SCNC: -3.2 MMOL/L (ref 0–2)
BASE EXCESS BLDCOV CALC-SCNC: -3.2 MMOL/L (ref 0–2)
BDY SITE: ABNORMAL
BDY SITE: ABNORMAL
BILIRUB SERPL-MCNC: <0.2 MG/DL (ref 0.2–1.2)
BODY TEMPERATURE: 37 C
BODY TEMPERATURE: 37 C
CO2 BLDA-SCNC: 25.2 MMOL/L (ref 22–33)
CO2 BLDA-SCNC: 27 MMOL/L (ref 22–33)
COLLECT TME SMN: ABNORMAL
CREAT BLD-MCNC: 0.58 MG/DL (ref 0.57–1)
DEPRECATED RDW RBC AUTO: 42.5 FL (ref 37–54)
ERYTHROCYTE [DISTWIDTH] IN BLOOD BY AUTOMATED COUNT: 13.1 % (ref 12.3–15.4)
HCO3 BLDCOA-SCNC: 25.2 MMOL/L (ref 16.9–20.5)
HCO3 BLDCOV-SCNC: 23.7 MMOL/L (ref 18.6–21.4)
HCT VFR BLD AUTO: 37.4 % (ref 34–46.6)
HGB BLD-MCNC: 12.3 G/DL (ref 12–15.9)
HGB BLDA-MCNC: 16.7 G/DL (ref 14–18)
HGB BLDA-MCNC: 17.3 G/DL (ref 14–18)
HOROWITZ INDEX BLD+IHG-RTO: 21 %
HOROWITZ INDEX BLD+IHG-RTO: 21 %
LDH SERPL-CCNC: 174 U/L (ref 135–214)
MCH RBC QN AUTO: 29.1 PG (ref 26.6–33)
MCHC RBC AUTO-ENTMCNC: 32.9 G/DL (ref 31.5–35.7)
MCV RBC AUTO: 88.6 FL (ref 79–97)
MODALITY: ABNORMAL
MODALITY: ABNORMAL
NOTE: ABNORMAL
NOTE: ABNORMAL
PCO2 BLDCOA: 56.8 MMHG (ref 43.3–54.9)
PCO2 BLDCOV: 47.9 MM HG
PH BLDCOA: 7.26 PH UNITS (ref 7.22–7.3)
PH BLDCOV: 7.3 PH UNITS
PLATELET # BLD AUTO: 249 10*3/MM3 (ref 140–450)
PMV BLD AUTO: 11.6 FL (ref 6–12)
PO2 BLDCOA: 8 MMHG (ref 11.5–43.3)
PO2 BLDCOV: 18.7 MM HG
RBC # BLD AUTO: 4.22 10*6/MM3 (ref 3.77–5.28)
SAO2 % BLDCOA: 8.9 %
SAO2 % BLDCOA: ABNORMAL %
SAO2 % BLDCOV: 37.3 %
URATE SERPL-MCNC: 7.3 MG/DL (ref 2.4–5.7)
WBC NRBC COR # BLD: 13.12 10*3/MM3 (ref 3.4–10.8)

## 2019-10-07 PROCEDURE — 85027 COMPLETE CBC AUTOMATED: CPT | Performed by: OBSTETRICS & GYNECOLOGY

## 2019-10-07 PROCEDURE — 88307 TISSUE EXAM BY PATHOLOGIST: CPT | Performed by: OBSTETRICS & GYNECOLOGY

## 2019-10-07 PROCEDURE — 25010000002 ONDANSETRON PER 1 MG: Performed by: ANESTHESIOLOGY

## 2019-10-07 PROCEDURE — 25010000003 CEFAZOLIN IN DEXTROSE 2-4 GM/100ML-% SOLUTION: Performed by: OBSTETRICS & GYNECOLOGY

## 2019-10-07 PROCEDURE — 83615 LACTATE (LD) (LDH) ENZYME: CPT | Performed by: OBSTETRICS & GYNECOLOGY

## 2019-10-07 PROCEDURE — 76819 FETAL BIOPHYS PROFIL W/O NST: CPT | Performed by: OBSTETRICS & GYNECOLOGY

## 2019-10-07 PROCEDURE — 63710000001 PROMETHAZINE PER 25 MG: Performed by: OBSTETRICS & GYNECOLOGY

## 2019-10-07 PROCEDURE — 25010000002 PROMETHAZINE PER 50 MG: Performed by: OBSTETRICS & GYNECOLOGY

## 2019-10-07 PROCEDURE — 82247 BILIRUBIN TOTAL: CPT | Performed by: OBSTETRICS & GYNECOLOGY

## 2019-10-07 PROCEDURE — 82805 BLOOD GASES W/O2 SATURATION: CPT

## 2019-10-07 PROCEDURE — 25010000002 FENTANYL CITRATE (PF) 100 MCG/2ML SOLUTION: Performed by: ANESTHESIOLOGY

## 2019-10-07 PROCEDURE — 84075 ASSAY ALKALINE PHOSPHATASE: CPT | Performed by: OBSTETRICS & GYNECOLOGY

## 2019-10-07 PROCEDURE — 82565 ASSAY OF CREATININE: CPT | Performed by: OBSTETRICS & GYNECOLOGY

## 2019-10-07 PROCEDURE — 84550 ASSAY OF BLOOD/URIC ACID: CPT | Performed by: OBSTETRICS & GYNECOLOGY

## 2019-10-07 PROCEDURE — 76819 FETAL BIOPHYS PROFIL W/O NST: CPT

## 2019-10-07 PROCEDURE — 25010000003 MORPHINE PER 10 MG: Performed by: ANESTHESIOLOGY

## 2019-10-07 PROCEDURE — 84460 ALANINE AMINO (ALT) (SGPT): CPT | Performed by: OBSTETRICS & GYNECOLOGY

## 2019-10-07 PROCEDURE — 99231 SBSQ HOSP IP/OBS SF/LOW 25: CPT | Performed by: OBSTETRICS & GYNECOLOGY

## 2019-10-07 PROCEDURE — 51702 INSERT TEMP BLADDER CATH: CPT

## 2019-10-07 PROCEDURE — 84450 TRANSFERASE (AST) (SGOT): CPT | Performed by: OBSTETRICS & GYNECOLOGY

## 2019-10-07 RX ORDER — MISOPROSTOL 200 UG/1
800 TABLET ORAL AS NEEDED
Status: DISCONTINUED | OUTPATIENT
Start: 2019-10-07 | End: 2019-10-11 | Stop reason: HOSPADM

## 2019-10-07 RX ORDER — ONDANSETRON 2 MG/ML
INJECTION INTRAMUSCULAR; INTRAVENOUS AS NEEDED
Status: DISCONTINUED | OUTPATIENT
Start: 2019-10-07 | End: 2019-10-07 | Stop reason: SURG

## 2019-10-07 RX ORDER — PROMETHAZINE HYDROCHLORIDE 12.5 MG/1
12.5 SUPPOSITORY RECTAL EVERY 6 HOURS PRN
Status: DISCONTINUED | OUTPATIENT
Start: 2019-10-07 | End: 2019-10-11 | Stop reason: HOSPADM

## 2019-10-07 RX ORDER — DEXTROSE AND SODIUM CHLORIDE 5; .2 G/100ML; G/100ML
75 INJECTION, SOLUTION INTRAVENOUS CONTINUOUS
Status: DISCONTINUED | OUTPATIENT
Start: 2019-10-07 | End: 2019-10-11 | Stop reason: HOSPADM

## 2019-10-07 RX ORDER — FAMOTIDINE 10 MG/ML
INJECTION, SOLUTION INTRAVENOUS AS NEEDED
Status: DISCONTINUED | OUTPATIENT
Start: 2019-10-07 | End: 2019-10-07 | Stop reason: SURG

## 2019-10-07 RX ORDER — NALOXONE HCL 0.4 MG/ML
0.4 VIAL (ML) INJECTION
Status: DISCONTINUED | OUTPATIENT
Start: 2019-10-07 | End: 2019-10-11 | Stop reason: HOSPADM

## 2019-10-07 RX ORDER — LABETALOL HYDROCHLORIDE 5 MG/ML
20-80 INJECTION, SOLUTION INTRAVENOUS
Status: DISCONTINUED | OUTPATIENT
Start: 2019-10-07 | End: 2019-10-07 | Stop reason: HOSPADM

## 2019-10-07 RX ORDER — IBUPROFEN 600 MG/1
600 TABLET ORAL EVERY 6 HOURS PRN
Status: DISCONTINUED | OUTPATIENT
Start: 2019-10-07 | End: 2019-10-11 | Stop reason: HOSPADM

## 2019-10-07 RX ORDER — MORPHINE SULFATE 2 MG/ML
2 INJECTION, SOLUTION INTRAMUSCULAR; INTRAVENOUS EVERY 4 HOURS PRN
Status: DISCONTINUED | OUTPATIENT
Start: 2019-10-07 | End: 2019-10-11 | Stop reason: HOSPADM

## 2019-10-07 RX ORDER — OXYCODONE AND ACETAMINOPHEN 10; 325 MG/1; MG/1
1 TABLET ORAL EVERY 4 HOURS PRN
Status: DISCONTINUED | OUTPATIENT
Start: 2019-10-07 | End: 2019-10-11 | Stop reason: HOSPADM

## 2019-10-07 RX ORDER — CARBOPROST TROMETHAMINE 250 UG/ML
250 INJECTION, SOLUTION INTRAMUSCULAR ONCE AS NEEDED
Status: DISCONTINUED | OUTPATIENT
Start: 2019-10-07 | End: 2019-10-11 | Stop reason: HOSPADM

## 2019-10-07 RX ORDER — SODIUM CHLORIDE, SODIUM LACTATE, POTASSIUM CHLORIDE, CALCIUM CHLORIDE 600; 310; 30; 20 MG/100ML; MG/100ML; MG/100ML; MG/100ML
INJECTION, SOLUTION INTRAVENOUS CONTINUOUS PRN
Status: DISCONTINUED | OUTPATIENT
Start: 2019-10-07 | End: 2019-10-07 | Stop reason: SURG

## 2019-10-07 RX ORDER — DIPHENHYDRAMINE HYDROCHLORIDE 50 MG/ML
25 INJECTION INTRAMUSCULAR; INTRAVENOUS EVERY 4 HOURS PRN
Status: DISCONTINUED | OUTPATIENT
Start: 2019-10-07 | End: 2019-10-11 | Stop reason: HOSPADM

## 2019-10-07 RX ORDER — ONDANSETRON 2 MG/ML
4 INJECTION INTRAMUSCULAR; INTRAVENOUS ONCE
Status: COMPLETED | OUTPATIENT
Start: 2019-10-07 | End: 2019-10-07

## 2019-10-07 RX ORDER — SIMETHICONE 80 MG
80 TABLET,CHEWABLE ORAL 4 TIMES DAILY PRN
Status: DISCONTINUED | OUTPATIENT
Start: 2019-10-07 | End: 2019-10-11 | Stop reason: HOSPADM

## 2019-10-07 RX ORDER — OXYCODONE AND ACETAMINOPHEN 7.5; 325 MG/1; MG/1
1 TABLET ORAL ONCE AS NEEDED
Status: COMPLETED | OUTPATIENT
Start: 2019-10-07 | End: 2019-10-07

## 2019-10-07 RX ORDER — NIFEDIPINE 30 MG/1
30 TABLET, EXTENDED RELEASE ORAL
Status: DISCONTINUED | OUTPATIENT
Start: 2019-10-07 | End: 2019-10-10

## 2019-10-07 RX ORDER — FENTANYL CITRATE 50 UG/ML
INJECTION, SOLUTION INTRAMUSCULAR; INTRAVENOUS AS NEEDED
Status: DISCONTINUED | OUTPATIENT
Start: 2019-10-07 | End: 2019-10-07 | Stop reason: SURG

## 2019-10-07 RX ORDER — MAGNESIUM SULFATE HEPTAHYDRATE 40 MG/ML
2 INJECTION, SOLUTION INTRAVENOUS CONTINUOUS
Status: DISCONTINUED | OUTPATIENT
Start: 2019-10-07 | End: 2019-10-11 | Stop reason: HOSPADM

## 2019-10-07 RX ORDER — CEFAZOLIN SODIUM 2 G/100ML
2 INJECTION, SOLUTION INTRAVENOUS ONCE
Status: DISCONTINUED | OUTPATIENT
Start: 2019-10-07 | End: 2019-10-07 | Stop reason: HOSPADM

## 2019-10-07 RX ORDER — PROMETHAZINE HYDROCHLORIDE 25 MG/1
25 TABLET ORAL EVERY 6 HOURS PRN
Status: DISCONTINUED | OUTPATIENT
Start: 2019-10-07 | End: 2019-10-11 | Stop reason: HOSPADM

## 2019-10-07 RX ORDER — DEXTROSE AND SODIUM CHLORIDE 5; .2 G/100ML; G/100ML
75 INJECTION, SOLUTION INTRAVENOUS CONTINUOUS
Status: DISCONTINUED | OUTPATIENT
Start: 2019-10-07 | End: 2019-10-07 | Stop reason: HOSPADM

## 2019-10-07 RX ORDER — BUPIVACAINE HYDROCHLORIDE 7.5 MG/ML
INJECTION, SOLUTION INTRASPINAL AS NEEDED
Status: DISCONTINUED | OUTPATIENT
Start: 2019-10-07 | End: 2019-10-07 | Stop reason: SURG

## 2019-10-07 RX ORDER — MORPHINE SULFATE 0.5 MG/ML
INJECTION, SOLUTION EPIDURAL; INTRATHECAL; INTRAVENOUS AS NEEDED
Status: DISCONTINUED | OUTPATIENT
Start: 2019-10-07 | End: 2019-10-07 | Stop reason: SURG

## 2019-10-07 RX ORDER — HYDROMORPHONE HYDROCHLORIDE 1 MG/ML
0.5 INJECTION, SOLUTION INTRAMUSCULAR; INTRAVENOUS; SUBCUTANEOUS
Status: ACTIVE | OUTPATIENT
Start: 2019-10-07 | End: 2019-10-08

## 2019-10-07 RX ORDER — DIPHENHYDRAMINE HCL 25 MG
25 CAPSULE ORAL EVERY 4 HOURS PRN
Status: DISCONTINUED | OUTPATIENT
Start: 2019-10-07 | End: 2019-10-11 | Stop reason: HOSPADM

## 2019-10-07 RX ORDER — PROMETHAZINE HYDROCHLORIDE 25 MG/ML
12.5 INJECTION, SOLUTION INTRAMUSCULAR; INTRAVENOUS ONCE
Status: COMPLETED | OUTPATIENT
Start: 2019-10-07 | End: 2019-10-07

## 2019-10-07 RX ORDER — LABETALOL 200 MG/1
300 TABLET, FILM COATED ORAL EVERY 8 HOURS SCHEDULED
Status: DISCONTINUED | OUTPATIENT
Start: 2019-10-08 | End: 2019-10-11 | Stop reason: HOSPADM

## 2019-10-07 RX ORDER — NALOXONE HCL 0.4 MG/ML
0.4 VIAL (ML) INJECTION
Status: ACTIVE | OUTPATIENT
Start: 2019-10-07 | End: 2019-10-08

## 2019-10-07 RX ORDER — OXYCODONE HYDROCHLORIDE AND ACETAMINOPHEN 5; 325 MG/1; MG/1
1 TABLET ORAL EVERY 4 HOURS PRN
Status: DISCONTINUED | OUTPATIENT
Start: 2019-10-07 | End: 2019-10-11 | Stop reason: HOSPADM

## 2019-10-07 RX ORDER — DOCUSATE SODIUM 100 MG/1
100 CAPSULE, LIQUID FILLED ORAL 2 TIMES DAILY PRN
Status: DISCONTINUED | OUTPATIENT
Start: 2019-10-07 | End: 2019-10-11 | Stop reason: HOSPADM

## 2019-10-07 RX ORDER — METHYLERGONOVINE MALEATE 0.2 MG/ML
200 INJECTION INTRAVENOUS ONCE AS NEEDED
Status: DISCONTINUED | OUTPATIENT
Start: 2019-10-07 | End: 2019-10-11 | Stop reason: HOSPADM

## 2019-10-07 RX ORDER — CARBOPROST TROMETHAMINE 250 UG/ML
250 INJECTION, SOLUTION INTRAMUSCULAR ONCE
Status: DISCONTINUED | OUTPATIENT
Start: 2019-10-07 | End: 2019-10-07

## 2019-10-07 RX ORDER — OXYTOCIN 10 [USP'U]/ML
INJECTION, SOLUTION INTRAMUSCULAR; INTRAVENOUS AS NEEDED
Status: DISCONTINUED | OUTPATIENT
Start: 2019-10-07 | End: 2019-10-07 | Stop reason: SURG

## 2019-10-07 RX ORDER — PROMETHAZINE HYDROCHLORIDE 25 MG/ML
12.5 INJECTION, SOLUTION INTRAMUSCULAR; INTRAVENOUS EVERY 6 HOURS PRN
Status: DISCONTINUED | OUTPATIENT
Start: 2019-10-07 | End: 2019-10-11 | Stop reason: HOSPADM

## 2019-10-07 RX ORDER — METHYLERGONOVINE MALEATE 0.2 MG/ML
200 INJECTION INTRAVENOUS ONCE
Status: DISCONTINUED | OUTPATIENT
Start: 2019-10-07 | End: 2019-10-07

## 2019-10-07 RX ORDER — NIFEDIPINE 10 MG/1
10 CAPSULE ORAL ONCE
Status: COMPLETED | OUTPATIENT
Start: 2019-10-07 | End: 2019-10-07

## 2019-10-07 RX ADMIN — NIFEDIPINE 30 MG: 30 TABLET, FILM COATED, EXTENDED RELEASE ORAL at 09:10

## 2019-10-07 RX ADMIN — MAGNESIUM SULFATE HEPTAHYDRATE 2 G/HR: 40 INJECTION, SOLUTION INTRAVENOUS at 17:23

## 2019-10-07 RX ADMIN — SODIUM CHLORIDE, POTASSIUM CHLORIDE, SODIUM LACTATE AND CALCIUM CHLORIDE: 600; 310; 30; 20 INJECTION, SOLUTION INTRAVENOUS at 11:56

## 2019-10-07 RX ADMIN — LABETALOL HYDROCHLORIDE 300 MG: 300 TABLET, FILM COATED ORAL at 23:25

## 2019-10-07 RX ADMIN — ONDANSETRON 4 MG: 2 INJECTION INTRAMUSCULAR; INTRAVENOUS at 14:19

## 2019-10-07 RX ADMIN — OXYCODONE HYDROCHLORIDE AND ACETAMINOPHEN 1 TABLET: 10; 325 TABLET ORAL at 14:19

## 2019-10-07 RX ADMIN — LABETALOL 20 MG/4 ML (5 MG/ML) INTRAVENOUS SYRINGE 20 MG: at 10:22

## 2019-10-07 RX ADMIN — ONDANSETRON 4 MG: 2 INJECTION INTRAMUSCULAR; INTRAVENOUS at 11:57

## 2019-10-07 RX ADMIN — MORPHINE SULFATE 0.2 MG: 0.5 INJECTION, SOLUTION EPIDURAL; INTRATHECAL; INTRAVENOUS at 12:08

## 2019-10-07 RX ADMIN — CEFAZOLIN SODIUM 2 G: 2 INJECTION, SOLUTION INTRAVENOUS at 11:45

## 2019-10-07 RX ADMIN — NIFEDIPINE 10 MG: 10 CAPSULE ORAL at 04:10

## 2019-10-07 RX ADMIN — OXYCODONE HYDROCHLORIDE AND ACETAMINOPHEN 1 TABLET: 7.5; 325 TABLET ORAL at 19:58

## 2019-10-07 RX ADMIN — LABETALOL HYDROCHLORIDE 600 MG: 300 TABLET, FILM COATED ORAL at 17:22

## 2019-10-07 RX ADMIN — PROMETHAZINE HYDROCHLORIDE 25 MG: 25 TABLET ORAL at 19:58

## 2019-10-07 RX ADMIN — DEXTROSE AND SODIUM CHLORIDE 75 ML/HR: 5; 200 INJECTION, SOLUTION INTRAVENOUS at 19:59

## 2019-10-07 RX ADMIN — LAMOTRIGINE 150 MG: 100 TABLET ORAL at 08:37

## 2019-10-07 RX ADMIN — OXCARBAZEPINE 300 MG: 150 TABLET, FILM COATED ORAL at 21:05

## 2019-10-07 RX ADMIN — LABETALOL HYDROCHLORIDE 600 MG: 300 TABLET, FILM COATED ORAL at 05:40

## 2019-10-07 RX ADMIN — PROMETHAZINE HYDROCHLORIDE 12.5 MG: 25 INJECTION INTRAMUSCULAR; INTRAVENOUS at 10:57

## 2019-10-07 RX ADMIN — BUPIVACAINE HYDROCHLORIDE IN DEXTROSE 1.7 ML: 7.5 INJECTION, SOLUTION SUBARACHNOID at 12:08

## 2019-10-07 RX ADMIN — FAMOTIDINE 20 MG: 10 INJECTION, SOLUTION INTRAVENOUS at 11:57

## 2019-10-07 RX ADMIN — FENTANYL CITRATE 20 MCG: 50 INJECTION, SOLUTION INTRAMUSCULAR; INTRAVENOUS at 12:08

## 2019-10-07 RX ADMIN — OXYTOCIN 30 UNITS: 10 INJECTION, SOLUTION INTRAMUSCULAR; INTRAVENOUS at 12:27

## 2019-10-07 RX ADMIN — DEXTROSE AND SODIUM CHLORIDE 75 ML/HR: 5; 200 INJECTION, SOLUTION INTRAVENOUS at 10:44

## 2019-10-07 RX ADMIN — ACETAMINOPHEN 650 MG: 325 TABLET ORAL at 08:37

## 2019-10-07 RX ADMIN — LEVOTHYROXINE SODIUM 62.5 MCG: 25 TABLET ORAL at 05:40

## 2019-10-07 NOTE — OP NOTE
Operative Note    Patient name: Bianca Mercado  YOB: 1985   MRN: [id number]  Admission Date: 2019  Referring Provider: Monique Dasilva MD    ID: 34 y.o.  at 32w1d    Preoperative Diagnosis:   Patient Active Problem List   Diagnosis   • Elderly primigravida in second trimester   • Pregnancy   • Echogenic focus of heart of fetus affecting antepartum care of mother   • Preeclampsia   preclampsia with severe features    Postoperative Diagnosis: Same as above.    Procedure(s): primarylow transverse  delivery     Surgeons: Surgeon(s) and Role:     * Monique Dasilva MD - Primary    Anesthesia: Spinal    Estimated Blood Loss: 800 mL      Preoperative antibiotic: Ancef (cefazolin) 2 grams    Blood products:   Blood Administration Record (From admission, onward)    None          Pathology: * No orders in the log *    Drains: Valdivia catheter to gravity    Complications: None    Condition: Stable to recovery room                                          Infant:                 Gender: female  infant    Weight: No birth weight on file.     Apgars:    @ 1 minute /        @ 5 minutes    Cord gases: Venous:  @BABYNOHDR(BRIEFLAB, PHCVEN, BECVEN)@     Arterial:  @BABYNOHDR(BRIEFLAB, PHCART, BECART)@         Operative Summary:   After obtaining informed consent the patient was taken to the operating room where adequate anesthesia was obtained.  Valdivia catheter was placed in the bladder preoperatively.  IV antibiotics were given preoperatively.       The abdomen was prepped and draped in the usual sterile fashion for  delivery.  After confirming adequate anesthesia a Pfannenstiel skin incision was made with the scalpel and carried through to the underlying layer of fascia.  The fascia was incised in the midline and the incision extended laterally with the Pa scissors and with blunt dissection.       The upper aspect of the fascia was grasped with 2 Kocher clamps, elevated, and dissected off  the underlying rectus muscles bluntly and with the Pa scissors.  The Kocher clamps were removed and applied to the inferior aspect of the fascia.  The fascia was dissected off of the rectus muscles in the same fashion.  The peritoneum was entered bluntly.  The incision was stretched and the bladder blade and Aguilar retractor inserted for visualization of the uterus. A bladder flap was made and bladder blade replaced.        The uterus was incised with the scalpel in a low transverse fashion.  The uterine incision was entered digitally and the incision extended bluntly in a cranial-caudal fashion.  Retractors were removed and membranes were ruptured.  The infant was delivered atraumatically from vtx presentation.  The umbilical cord was milked 4 times, clamped and cut and the nose and mouth bulb suctioned.  The infant was handed off to waiting pediatric staff.       Cord blood gases were collected from a clamped segment of umbilical cord.  Cord blood was collected.  The placenta was removed using cord traction and uterine massage.  The uterus was exteriorized and cleared of all clots and debris.  The uterine incision was repaired with #1 chromic in a running locked fashion. A double layer technique was used.  Additional hemostatic measures required: none.    The incision was inspected and excellent hemostasis was noted.  The tubes and ovaries were noted to be normal.   The uterus was returned to the abdomen.  The gutters were cleared of all clots and debris.  Irrigation was used.  The uterine incision was again inspected and found to be hemostatic.       The peritoneum was reapproximated with 2-0 vicryl in a running fashion.  The fascia was closed with 0 vicryl in a running fashion.  The subcutaneous space was reapproximated using 3-0 plain gut in interrupted stiches.      The skin was closed using staples, and dressing placed. All sharp, instrument, and sponge counts were correct. The patient was transferred  to the recovery room in stable condition.    Monique Dasilva MD  10/7/2019

## 2019-10-07 NOTE — ANESTHESIA POSTPROCEDURE EVALUATION
Patient: Bianca Mercado    Procedure Summary     Date:  10/07/19 Room / Location:  [facility] LABOR DELIVERY 2 /  [facility]    Anesthesia Start:  1156 Anesthesia Stop:  1254    Procedure:   SECTION PRIMARY (N/A Abdomen) Diagnosis:      Surgeon:  Monqiue Dasilva MD Provider:  Robb Oleary DO    Anesthesia Type:  spinal, ITN ASA Status:  3 - Emergent          Anesthesia Type: spinal, ITN  Last vitals  /71   Temp 97.5   Pulse 78   Resp 17   SpO2 95     Post Anesthesia Care and Evaluation    Patient location during evaluation: bedside  Patient participation: complete - patient participated  Level of consciousness: awake and alert  Pain score: 0  Pain management: satisfactory to patient  Airway patency: patent  Anesthetic complications: No anesthetic complications  PONV Status: none  Cardiovascular status: acceptable  Respiratory status: acceptable  Hydration status: acceptable

## 2019-10-07 NOTE — PROGRESS NOTES
Maternal-Fetal Medicine   Progress Note    Patient name: Bianca Mercado  YOB: 1985   MRN: [id number]  Admission Date: 2019  Date of Service: 10/7/2019  Referring Provider: Monique Dasilva       Bianca Mercado is a 34 y.o.    at 32w1d  admitted on 2019 for Preeclampsia    Hospital day 19    Chief Complaint   Patient presents with   • Elevated Blood Pressure     preeclampsia       Diagnoses:   Patient Active Problem List   Diagnosis   • Elderly primigravida in second trimester   • Pregnancy   • Echogenic focus of heart of fetus affecting antepartum care of mother   • Preeclampsia       Subjective:      Bianca has complaints today of a mild headache as well as mild right upper quadrant pain.  She has not taken any medication for her headache.   Patient reports headache:   Patient reports right upper quadrant pain:   Reports fetal movement is normal  Denies leakage of amniotic fluid.  Denies vaginal bleeding    Objective:     Vital signs:  Temp:  [97.9 °F (36.6 °C)-98.5 °F (36.9 °C)] 98.1 °F (36.7 °C)  Heart Rate:  [70-84] 84  Resp:  [14-16] 16  BP: (142-189)/(74-93) 146/85    General appearance: She has moderate facial edema  Abdomen: soft, nontender  Uterus: gravid, nontender.  The fetus is in a vertex presentation  Extremities: nontender; 2+ edema     Fetal heart rate tracing review  FHT's: Reactive by 10 x 10 criteria.  No periodic decelerations  Cervix: last check      Most recent ultrasound:  Vertex.  Biophysical profile 8 of 8.  Fluid volume slightly increased with an JOLENE of 21.9 cm.  Umbilical artery Dopplers are mildly elevated ranging from 4.56-5.36 to 1    Medications:    labetalol 600 mg Oral Q8H   lamoTRIgine 150 mg Oral Daily   levothyroxine 62.5 mcg Oral Q AM   NIFEdipine XL 30 mg Oral Q24H   OXcarbazepine 300 mg Oral Nightly      •  acetaminophen  •  influenza vaccine  •  labetalol  •  promethazine    Labs:  Lab Results   Component Value Date    HGB 11.5 (L)  10/05/2019     Lab Results   Component Value Date    GLUCOSE 103 (H) 2018         Assessment/Plan:      Bianca is a 34 y.o.    at 32w1d.  1. Preeclampsia: Complains today of mild headache and mild right upper quadrant pain  2.  Recent severe range blood pressure although currently stable  3.  Elevated umbilical artery Doppler studies  4.  Normal biophysical profile  5.  Nonstress test reactive by 10 x 10 criteria  6.  The previously noted placenta previa has resolved    Plan:   1.  CBC and preeclampsia panel  2.  Restart nifedipine 30 mg XL p.o. daily  3.  Delivery for achievement of 34 weeks gestation or worsening parameters of preeclampsia from either maternal or fetal standpoint  4.  Twice weekly biophysical profiles    I spent 15 minutes with this patient of which greater than 50% was face to face counseling and coordination of care.  All questions were answered to the best of my ablity.    Carlin Iverson MD  10/7/2019  8:33 AM

## 2019-10-07 NOTE — ANESTHESIA PREPROCEDURE EVALUATION
Anesthesia Evaluation     Patient summary reviewed and Nursing notes reviewed   NPO Solid Status: > 6 hours  NPO Liquid Status: > 6 hours           Airway   Mallampati: II  TM distance: >3 FB  Neck ROM: full  No difficulty expected  Dental      Pulmonary - negative pulmonary ROS   Cardiovascular - negative cardio ROS        Neuro/Psych  (+) psychiatric history Anxiety and Depression,     GI/Hepatic/Renal/Endo    (+)   hypothyroidism,     Musculoskeletal (-) negative ROS    Abdominal    Substance History - negative use     OB/GYN    (+) Pregnant, Preeclampsia,         Other                      Anesthesia Plan    ASA 3 - emergent     spinal and ITN     Anesthetic plan, all risks, benefits, and alternatives have been provided, discussed and informed consent has been obtained with: patient.  Use of blood products discussed with patient .

## 2019-10-07 NOTE — PROGRESS NOTES
Bianca continues to complain of headache and RUQ pain, labs and PDC US reviewed. She has now had 2 more severe range pressures. S/p steroids, plus rescue dose. Discussed with dr cook who rec magnesium and delivery for preeclampsia now with severe features.  Will notify nicu. She is agreeable to this plan.

## 2019-10-07 NOTE — PLAN OF CARE
Problem: Hypertensive Disorders in Pregnancy (Adult,Obstetrics,Pediatric)  Goal: Signs and Symptoms of Listed Potential Problems Will be Absent, Minimized or Managed (Hypertensive Disorders in Pregnancy)  Outcome: Ongoing (interventions implemented as appropriate)      Problem:  Delivery (Adult,Obstetrics,Pediatric)  Goal: Signs and Symptoms of Listed Potential Problems Will be Absent, Minimized or Managed ( Delivery)  Outcome: Ongoing (interventions implemented as appropriate)

## 2019-10-07 NOTE — ANESTHESIA PROCEDURE NOTES
Spinal Block      Patient reassessed immediately prior to procedure    Patient location during procedure: OB  Performed By  Anesthesiologist: Robb Oleary DO  Preanesthetic Checklist  Completed: patient identified, site marked, surgical consent, pre-op evaluation, timeout performed, IV checked, risks and benefits discussed and monitors and equipment checked  Spinal Block Prep:  Patient Position:sitting  Sterile Tech:cap, gloves, mask and sterile barriers  Prep:Chloraprep  Patient Monitoring:blood pressure monitoring, continuous pulse oximetry and EKG  Spinal Block Procedure  Approach:midline  Guidance:landmark technique and palpation technique  Location:L3-L4  Needle Type:Russel  Needle Gauge:25 G  Placement of Spinal needle event:cerebrospinal fluid aspirated  Paresthesia: no  Fluid Appearance:clear     Post Assessment  Patient Tolerance:patient tolerated the procedure well with no apparent complications  Complications no

## 2019-10-08 LAB
ABO GROUP BLD: NORMAL
ALP SERPL-CCNC: 147 U/L (ref 39–117)
ALT SERPL W P-5'-P-CCNC: 14 U/L (ref 1–33)
AST SERPL-CCNC: 16 U/L (ref 1–32)
BASOPHILS # BLD AUTO: 0.03 10*3/MM3 (ref 0–0.2)
BASOPHILS NFR BLD AUTO: 0.2 % (ref 0–1.5)
BILIRUB SERPL-MCNC: <0.2 MG/DL (ref 0.2–1.2)
CREAT BLD-MCNC: 0.6 MG/DL (ref 0.57–1)
CYTO UR: NORMAL
DEPRECATED RDW RBC AUTO: 43.1 FL (ref 37–54)
EOSINOPHIL # BLD AUTO: 0.11 10*3/MM3 (ref 0–0.4)
EOSINOPHIL NFR BLD AUTO: 0.7 % (ref 0.3–6.2)
ERYTHROCYTE [DISTWIDTH] IN BLOOD BY AUTOMATED COUNT: 13.2 % (ref 12.3–15.4)
FETAL BLEED: NEGATIVE
HCT VFR BLD AUTO: 32.4 % (ref 34–46.6)
HGB BLD-MCNC: 10.7 G/DL (ref 12–15.9)
IMM GRANULOCYTES # BLD AUTO: 0.16 10*3/MM3 (ref 0–0.05)
IMM GRANULOCYTES NFR BLD AUTO: 1 % (ref 0–0.5)
LAB AP CASE REPORT: NORMAL
LAB AP CLINICAL INFORMATION: NORMAL
LAB AP DIAGNOSIS COMMENT: NORMAL
LDH SERPL-CCNC: 233 U/L (ref 135–214)
LYMPHOCYTES # BLD AUTO: 2.25 10*3/MM3 (ref 0.7–3.1)
LYMPHOCYTES NFR BLD AUTO: 13.7 % (ref 19.6–45.3)
MCH RBC QN AUTO: 29.8 PG (ref 26.6–33)
MCHC RBC AUTO-ENTMCNC: 33 G/DL (ref 31.5–35.7)
MCV RBC AUTO: 90.3 FL (ref 79–97)
MONOCYTES # BLD AUTO: 1.13 10*3/MM3 (ref 0.1–0.9)
MONOCYTES NFR BLD AUTO: 6.9 % (ref 5–12)
NEUTROPHILS # BLD AUTO: 12.69 10*3/MM3 (ref 1.7–7)
NEUTROPHILS NFR BLD AUTO: 77.5 % (ref 42.7–76)
NRBC BLD AUTO-RTO: 0.1 /100 WBC (ref 0–0.2)
NUMBER OF DOSES: NORMAL
PATH REPORT.FINAL DX SPEC: NORMAL
PATH REPORT.GROSS SPEC: NORMAL
PLATELET # BLD AUTO: 254 10*3/MM3 (ref 140–450)
PMV BLD AUTO: 11.9 FL (ref 6–12)
RBC # BLD AUTO: 3.59 10*6/MM3 (ref 3.77–5.28)
RH BLD: NEGATIVE
URATE SERPL-MCNC: 7.1 MG/DL (ref 2.4–5.7)
WBC NRBC COR # BLD: 16.37 10*3/MM3 (ref 3.4–10.8)

## 2019-10-08 PROCEDURE — 83615 LACTATE (LD) (LDH) ENZYME: CPT | Performed by: OBSTETRICS & GYNECOLOGY

## 2019-10-08 PROCEDURE — 86901 BLOOD TYPING SEROLOGIC RH(D): CPT | Performed by: OBSTETRICS & GYNECOLOGY

## 2019-10-08 PROCEDURE — 84075 ASSAY ALKALINE PHOSPHATASE: CPT | Performed by: OBSTETRICS & GYNECOLOGY

## 2019-10-08 PROCEDURE — 82565 ASSAY OF CREATININE: CPT | Performed by: OBSTETRICS & GYNECOLOGY

## 2019-10-08 PROCEDURE — 86900 BLOOD TYPING SEROLOGIC ABO: CPT | Performed by: OBSTETRICS & GYNECOLOGY

## 2019-10-08 PROCEDURE — 84460 ALANINE AMINO (ALT) (SGPT): CPT | Performed by: OBSTETRICS & GYNECOLOGY

## 2019-10-08 PROCEDURE — 84450 TRANSFERASE (AST) (SGOT): CPT | Performed by: OBSTETRICS & GYNECOLOGY

## 2019-10-08 PROCEDURE — 85025 COMPLETE CBC W/AUTO DIFF WBC: CPT | Performed by: OBSTETRICS & GYNECOLOGY

## 2019-10-08 PROCEDURE — 82247 BILIRUBIN TOTAL: CPT | Performed by: OBSTETRICS & GYNECOLOGY

## 2019-10-08 PROCEDURE — 85461 HEMOGLOBIN FETAL: CPT | Performed by: OBSTETRICS & GYNECOLOGY

## 2019-10-08 PROCEDURE — 84550 ASSAY OF BLOOD/URIC ACID: CPT | Performed by: OBSTETRICS & GYNECOLOGY

## 2019-10-08 PROCEDURE — 25010000002 RHO D IMMUNE GLOBULIN 1500 UNIT/2ML SOLUTION PREFILLED SYRINGE: Performed by: OBSTETRICS & GYNECOLOGY

## 2019-10-08 RX ADMIN — LABETALOL HYDROCHLORIDE 300 MG: 300 TABLET, FILM COATED ORAL at 06:16

## 2019-10-08 RX ADMIN — OXCARBAZEPINE 300 MG: 150 TABLET, FILM COATED ORAL at 21:46

## 2019-10-08 RX ADMIN — OXYCODONE HYDROCHLORIDE AND ACETAMINOPHEN 1 TABLET: 10; 325 TABLET ORAL at 01:24

## 2019-10-08 RX ADMIN — OXYCODONE HYDROCHLORIDE AND ACETAMINOPHEN 1 TABLET: 5; 325 TABLET ORAL at 16:42

## 2019-10-08 RX ADMIN — OXYCODONE HYDROCHLORIDE AND ACETAMINOPHEN 1 TABLET: 5; 325 TABLET ORAL at 05:28

## 2019-10-08 RX ADMIN — HUMAN RHO(D) IMMUNE GLOBULIN 1500 UNITS: 1500 SOLUTION INTRAMUSCULAR; INTRAVENOUS at 16:43

## 2019-10-08 RX ADMIN — POLYETHYLENE GLYCOL 3350 17 G: 17 POWDER, FOR SOLUTION ORAL at 09:15

## 2019-10-08 RX ADMIN — LAMOTRIGINE 150 MG: 100 TABLET ORAL at 09:12

## 2019-10-08 RX ADMIN — LABETALOL HYDROCHLORIDE 300 MG: 300 TABLET, FILM COATED ORAL at 21:46

## 2019-10-08 RX ADMIN — DEXTROSE AND SODIUM CHLORIDE 75 ML/HR: 5; 200 INJECTION, SOLUTION INTRAVENOUS at 09:20

## 2019-10-08 RX ADMIN — OXYCODONE HYDROCHLORIDE AND ACETAMINOPHEN 1 TABLET: 5; 325 TABLET ORAL at 11:51

## 2019-10-08 RX ADMIN — LEVOTHYROXINE SODIUM 62.5 MCG: 25 TABLET ORAL at 06:16

## 2019-10-08 RX ADMIN — LABETALOL HYDROCHLORIDE 300 MG: 300 TABLET, FILM COATED ORAL at 16:42

## 2019-10-08 RX ADMIN — NIFEDIPINE 30 MG: 30 TABLET, FILM COATED, EXTENDED RELEASE ORAL at 09:12

## 2019-10-08 RX ADMIN — OXYCODONE HYDROCHLORIDE AND ACETAMINOPHEN 1 TABLET: 10; 325 TABLET ORAL at 21:46

## 2019-10-08 RX ADMIN — MAGNESIUM SULFATE HEPTAHYDRATE 2 G/HR: 40 INJECTION, SOLUTION INTRAVENOUS at 06:17

## 2019-10-08 NOTE — LACTATION NOTE
Mom set up to pump.  Currently pumping one breast at a time due to IV placement but will begin bilateral pumping once IV is stopped.     10/08/19 1150   Maternal Information   Date of Referral 10/08/19   Person Making Referral physician;other (see comments)  (courtesy)   Infant Reason for Referral NICU admission   Maternal Assessment   Breast Size Issue none   Breast Shape Bilateral:;round   Breast Density Bilateral:;soft   Nipples Bilateral:;everted;short   Maternal Infant Feeding   Maternal Emotional State assist needed   Equipment Type   Breast Pump Type double electric, hospital grade;double electric, personal  (lansinoh pump at home)   Breast Pump Flange Type hard   Breast Pump Flange Size 24 mm   Reproductive Interventions   Breast Care: Breastfeeding frequency of feeding adjusted   Breastfeeding Assistance electric breast pump used;support offered   Breastfeeding Support lactation counseling provided   Breast Pumping   Breast Pumping bilateral breasts pumped until soft   Coping/Psychosocial Interventions   Parent/Child Attachment Promotion participation in care promoted;skin-to-skin contact encouraged   Supportive Measures counseling provided

## 2019-10-08 NOTE — CONSULTS
Continued Stay Note  [facility]     Patient Name: Bianca Mercado  MRN: [id number]  Today's Date: 10/8/2019    Admit Date: 9/18/2019    Discharge Plan     Row Name 10/08/19 1545       Plan    Plan  SW Support Letter    Plan Comments  SW met with patient to offer support while her baby is in the NICU. A NICU Support Letter was provide to patient. Patient explained that she doesn't have any needs or questions at this time. SW Available         Discharge Codes    No documentation.             Bridgette Zheng) SONU Price

## 2019-10-08 NOTE — PROGRESS NOTES
10/8/2019  PPD #1    Subjective   Bianca feels well.  Patient describes her lochia less than menses.  Pain is well controlled    Her bps have been stable since delivery, on magnesium.        Objective   Temp: Temp:  [97.5 °F (36.4 °C)-98.2 °F (36.8 °C)] 98.2 °F (36.8 °C) Temp src: Oral   BP: BP: (111-147)/(69-95) 136/81        Pulse: Heart Rate:  [64-84] 72  RR: Resp:  [12-20] 18    General:  No acute distress   Abdomen: Fundus firm and beneath umbilicus   Pelvis: deferred     Lab Results   Component Value Date    WBC 16.37 (H) 10/08/2019    HGB 10.7 (L) 10/08/2019    HCT 32.4 (L) 10/08/2019    MCV 90.3 10/08/2019     10/08/2019    HEPBSAG Negative 12/05/2016       Assessment  1. PPD# 1 after Primary c/s, cont magnesium for 24 hours for her severe preeclampsia. Baby stable in nicu.    Plan  1. \will DC Mag at 24 hours, then iof astable can transfer to MBU      This note has been electronically signed.    Monique Dasilva MD  October 8, 2019

## 2019-10-08 NOTE — ANESTHESIA POSTPROCEDURE EVALUATION
Patient: Bianca Mercado    Procedure Summary     Date:  10/07/19 Room / Location:  [facility] LABOR DELIVERY 2 /  [facility]    Anesthesia Start:  1156 Anesthesia Stop:  1254    Procedure:   SECTION PRIMARY (N/A Abdomen) Diagnosis:      Surgeon:  Monique Dasilva MD Provider:  Robb Oleary DO    Anesthesia Type:  spinal, ITN ASA Status:  3 - Emergent          Anesthesia Type: spinal, ITN  Last vitals  BP   136/81 (10/08/19 0614)   Temp   98.2 °F (36.8 °C) (10/08/19 0353)   Pulse   72 (10/08/19 0615)   Resp   18 (10/08/19 0614)     SpO2   96 % (10/08/19 0615)     Post Anesthesia Care and Evaluation    Patient location during evaluation: bedside  Patient participation: complete - patient participated  Level of consciousness: awake and alert  Pain management: adequate  Airway patency: patent  Anesthetic complications: No anesthetic complications    Cardiovascular status: acceptable  Respiratory status: acceptable  Hydration status: acceptable  Post Neuraxial Block status: Motor and sensory function returned to baseline and No signs or symptoms of PDPH

## 2019-10-09 RX ADMIN — LEVOTHYROXINE SODIUM 62.5 MCG: 25 TABLET ORAL at 06:32

## 2019-10-09 RX ADMIN — LAMOTRIGINE 150 MG: 100 TABLET ORAL at 08:39

## 2019-10-09 RX ADMIN — IBUPROFEN 600 MG: 600 TABLET, FILM COATED ORAL at 22:26

## 2019-10-09 RX ADMIN — OXCARBAZEPINE 300 MG: 150 TABLET, FILM COATED ORAL at 22:26

## 2019-10-09 RX ADMIN — OXYCODONE HYDROCHLORIDE AND ACETAMINOPHEN 1 TABLET: 10; 325 TABLET ORAL at 19:38

## 2019-10-09 RX ADMIN — OXYCODONE HYDROCHLORIDE AND ACETAMINOPHEN 1 TABLET: 10; 325 TABLET ORAL at 06:33

## 2019-10-09 RX ADMIN — OXYCODONE HYDROCHLORIDE AND ACETAMINOPHEN 1 TABLET: 5; 325 TABLET ORAL at 23:48

## 2019-10-09 RX ADMIN — OXYCODONE HYDROCHLORIDE AND ACETAMINOPHEN 1 TABLET: 5; 325 TABLET ORAL at 12:47

## 2019-10-09 RX ADMIN — OXYCODONE HYDROCHLORIDE AND ACETAMINOPHEN 1 TABLET: 10; 325 TABLET ORAL at 02:22

## 2019-10-09 RX ADMIN — LABETALOL HYDROCHLORIDE 300 MG: 300 TABLET, FILM COATED ORAL at 22:26

## 2019-10-09 RX ADMIN — IBUPROFEN 600 MG: 600 TABLET, FILM COATED ORAL at 16:18

## 2019-10-09 RX ADMIN — IBUPROFEN 600 MG: 600 TABLET, FILM COATED ORAL at 10:03

## 2019-10-09 RX ADMIN — LABETALOL HYDROCHLORIDE 300 MG: 300 TABLET, FILM COATED ORAL at 16:17

## 2019-10-09 RX ADMIN — SIMETHICONE CHEW TAB 80 MG 80 MG: 80 TABLET ORAL at 08:39

## 2019-10-09 RX ADMIN — NIFEDIPINE 30 MG: 30 TABLET, FILM COATED, EXTENDED RELEASE ORAL at 08:39

## 2019-10-09 RX ADMIN — IBUPROFEN 600 MG: 600 TABLET, FILM COATED ORAL at 03:43

## 2019-10-09 RX ADMIN — LABETALOL HYDROCHLORIDE 300 MG: 300 TABLET, FILM COATED ORAL at 06:32

## 2019-10-09 RX ADMIN — POLYETHYLENE GLYCOL 3350 17 G: 17 POWDER, FOR SOLUTION ORAL at 08:39

## 2019-10-09 RX ADMIN — SIMETHICONE CHEW TAB 80 MG 80 MG: 80 TABLET ORAL at 22:26

## 2019-10-09 NOTE — PROGRESS NOTES
10/9/2019    Name:Bianca Mercado    MR#:[id number]     PROGRESS NOTE:  Post-Op 2 S/P    HD:21    Subjective   34 y.o. yo Female  s/p CS at 32w1d doing well. Pain well controlled. Tolerating regular diet and having flatus. Lochia normal. Denies HA, vision changes, minimal edema    Patient Active Problem List   Diagnosis   • Elderly primigravida in second trimester   • Pregnancy   • Echogenic focus of heart of fetus affecting antepartum care of mother   • Preeclampsia        Objective    Vitals  Temp:  Temp:  [97.7 °F (36.5 °C)-99.3 °F (37.4 °C)] 98.3 °F (36.8 °C)  Temp src: Oral  BP:  BP: (116-139)/(68-83) 139/78  Pulse:  Heart Rate:  [71-92] 86  RR:   Resp:  [15-18] 16    General Awake, alert, no distress  Abdomen Soft, non-distended, fundus firm, below umbilicus, appropriately tender  Incision  Intact, no erythema or exudate  Extremities Calves NT bilaterally     I/O last 3 completed shifts:  In: 2137.1 [I.V.:2137.1]  Out: 7325 [Urine:7325]    LABS:   Lab Results   Component Value Date    WBC 16.37 (H) 10/08/2019    HGB 10.7 (L) 10/08/2019    HCT 32.4 (L) 10/08/2019    MCV 90.3 10/08/2019     10/08/2019       Infant: female , stable in NICU per pt      Assessment   1.  POD 2   2. Preeclampsia- s/p MGSO4, BP stable on labetalol 300 mg tid, Nifedipine XL 30 mg daily. Asymptomatic    Plan: Doing well.  Routine postoperative care      Active Problems:   None      Yesy Card, GALLITO  10/9/2019 9:21 AM

## 2019-10-10 LAB
ALP SERPL-CCNC: 149 U/L (ref 39–117)
ALT SERPL W P-5'-P-CCNC: 15 U/L (ref 1–33)
AST SERPL-CCNC: 19 U/L (ref 1–32)
BILIRUB SERPL-MCNC: <0.2 MG/DL (ref 0.2–1.2)
CREAT BLD-MCNC: 0.5 MG/DL (ref 0.57–1)
DEPRECATED RDW RBC AUTO: 46.4 FL (ref 37–54)
ERYTHROCYTE [DISTWIDTH] IN BLOOD BY AUTOMATED COUNT: 13.8 % (ref 12.3–15.4)
HCT VFR BLD AUTO: 32.9 % (ref 34–46.6)
HGB BLD-MCNC: 10.4 G/DL (ref 12–15.9)
LDH SERPL-CCNC: 201 U/L (ref 135–214)
MCH RBC QN AUTO: 29.4 PG (ref 26.6–33)
MCHC RBC AUTO-ENTMCNC: 31.6 G/DL (ref 31.5–35.7)
MCV RBC AUTO: 92.9 FL (ref 79–97)
PLATELET # BLD AUTO: 256 10*3/MM3 (ref 140–450)
PMV BLD AUTO: 11.4 FL (ref 6–12)
RBC # BLD AUTO: 3.54 10*6/MM3 (ref 3.77–5.28)
URATE SERPL-MCNC: 5.9 MG/DL (ref 2.4–5.7)
WBC NRBC COR # BLD: 13.15 10*3/MM3 (ref 3.4–10.8)

## 2019-10-10 PROCEDURE — 82565 ASSAY OF CREATININE: CPT | Performed by: NURSE PRACTITIONER

## 2019-10-10 PROCEDURE — 85027 COMPLETE CBC AUTOMATED: CPT | Performed by: NURSE PRACTITIONER

## 2019-10-10 PROCEDURE — 84460 ALANINE AMINO (ALT) (SGPT): CPT | Performed by: NURSE PRACTITIONER

## 2019-10-10 PROCEDURE — 83615 LACTATE (LD) (LDH) ENZYME: CPT | Performed by: NURSE PRACTITIONER

## 2019-10-10 PROCEDURE — 84450 TRANSFERASE (AST) (SGOT): CPT | Performed by: NURSE PRACTITIONER

## 2019-10-10 PROCEDURE — 84075 ASSAY ALKALINE PHOSPHATASE: CPT | Performed by: NURSE PRACTITIONER

## 2019-10-10 PROCEDURE — 82247 BILIRUBIN TOTAL: CPT | Performed by: NURSE PRACTITIONER

## 2019-10-10 PROCEDURE — 84550 ASSAY OF BLOOD/URIC ACID: CPT | Performed by: NURSE PRACTITIONER

## 2019-10-10 RX ORDER — NIFEDIPINE 30 MG/1
30 TABLET, EXTENDED RELEASE ORAL 2 TIMES DAILY
Status: DISCONTINUED | OUTPATIENT
Start: 2019-10-10 | End: 2019-10-11 | Stop reason: HOSPADM

## 2019-10-10 RX ADMIN — LAMOTRIGINE 150 MG: 100 TABLET ORAL at 08:38

## 2019-10-10 RX ADMIN — OXYCODONE HYDROCHLORIDE AND ACETAMINOPHEN 1 TABLET: 5; 325 TABLET ORAL at 17:01

## 2019-10-10 RX ADMIN — POLYETHYLENE GLYCOL 3350 17 G: 17 POWDER, FOR SOLUTION ORAL at 08:38

## 2019-10-10 RX ADMIN — NIFEDIPINE 30 MG: 30 TABLET, FILM COATED, EXTENDED RELEASE ORAL at 08:38

## 2019-10-10 RX ADMIN — SIMETHICONE CHEW TAB 80 MG 80 MG: 80 TABLET ORAL at 08:38

## 2019-10-10 RX ADMIN — IBUPROFEN 600 MG: 600 TABLET, FILM COATED ORAL at 12:22

## 2019-10-10 RX ADMIN — IBUPROFEN 600 MG: 600 TABLET, FILM COATED ORAL at 21:08

## 2019-10-10 RX ADMIN — LABETALOL HYDROCHLORIDE 300 MG: 300 TABLET, FILM COATED ORAL at 22:20

## 2019-10-10 RX ADMIN — OXYCODONE HYDROCHLORIDE AND ACETAMINOPHEN 1 TABLET: 5; 325 TABLET ORAL at 12:22

## 2019-10-10 RX ADMIN — LEVOTHYROXINE SODIUM 62.5 MCG: 25 TABLET ORAL at 05:59

## 2019-10-10 RX ADMIN — LABETALOL HYDROCHLORIDE 300 MG: 300 TABLET, FILM COATED ORAL at 14:29

## 2019-10-10 RX ADMIN — LABETALOL HYDROCHLORIDE 300 MG: 300 TABLET, FILM COATED ORAL at 06:00

## 2019-10-10 RX ADMIN — NIFEDIPINE 30 MG: 30 TABLET, FILM COATED, EXTENDED RELEASE ORAL at 21:07

## 2019-10-10 RX ADMIN — OXCARBAZEPINE 300 MG: 150 TABLET, FILM COATED ORAL at 21:08

## 2019-10-10 RX ADMIN — IBUPROFEN 600 MG: 600 TABLET, FILM COATED ORAL at 04:28

## 2019-10-10 RX ADMIN — OXYCODONE HYDROCHLORIDE AND ACETAMINOPHEN 1 TABLET: 5; 325 TABLET ORAL at 04:28

## 2019-10-10 RX ADMIN — OXYCODONE HYDROCHLORIDE AND ACETAMINOPHEN 1 TABLET: 10; 325 TABLET ORAL at 22:20

## 2019-10-10 NOTE — PROGRESS NOTES
10/10/2019    Name:Bianca Mercado    MR#:[id number]     PROGRESS NOTE:  Post-Op 3 S/P    HD:22    Subjective   34 y.o. yo Female  s/p CS at 32w1d doing well. Pain well controlled. Tolerating regular diet and having flatus. Lochia normal. Denies HA, vision changes. Edema decreased overnight    Patient Active Problem List   Diagnosis   • Elderly primigravida in second trimester   • Pregnancy   • Echogenic focus of heart of fetus affecting antepartum care of mother   • Preeclampsia        Objective    Vitals  Temp:  Temp:  [97.6 °F (36.4 °C)-98.2 °F (36.8 °C)] 97.6 °F (36.4 °C)  Temp src: Oral  BP:  BP: (136-155)/(68-86) 155/74  Pulse:  Heart Rate:  [] 83  RR:   Resp:  [16] 16    General Awake, alert, no distress  Abdomen Soft, non-distended, fundus firm, below umbilicus, appropriately tender  Incision  Intact, no erythema or exudate  Extremities Calves NT bilaterally     I/O last 3 completed shifts:  In: -   Out: 5650 [Urine:5650]    LABS:   Lab Results   Component Value Date    WBC 16.37 (H) 10/08/2019    HGB 10.7 (L) 10/08/2019    HCT 32.4 (L) 10/08/2019    MCV 90.3 10/08/2019     10/08/2019       Infant: female , stable in NICU      Assessment   1.  POD 3   2. Preeclampsia- BP's increased through the day yesterday. Asymptomatic. S/P MGSO4    Plan: Doing well.  Routine postoperative care  CBC, PEP today, will d/w KS and likely increase procardia to bid  Plan for D/C tomorrow if BP stable      Active Problems:   None      Yesy Card, APRN  10/10/2019 8:38 AM

## 2019-10-10 NOTE — PLAN OF CARE
Problem: Patient Care Overview  Goal: Plan of Care Review  Outcome: Ongoing (interventions implemented as appropriate)   10/10/19 0001   Plan of Care Review   Progress improving   OTHER   Outcome Summary BP stable with medications, pain controlled with meds. Pumping for baby in NICU. Inc. no signs of infection.     Goal: Individualization and Mutuality  Outcome: Ongoing (interventions implemented as appropriate)    Goal: Discharge Needs Assessment  Outcome: Ongoing (interventions implemented as appropriate)   10/10/19 0001   Discharge Needs Assessment   Patient/Family Anticipates Transition to home with family     Goal: Interprofessional Rounds/Family Conf  Outcome: Ongoing (interventions implemented as appropriate)      Problem: Hypertensive Disorders in Pregnancy (Adult,Obstetrics,Pediatric)  Goal: Signs and Symptoms of Listed Potential Problems Will be Absent, Minimized or Managed (Hypertensive Disorders in Pregnancy)  Outcome: Ongoing (interventions implemented as appropriate)   10/10/19 0001   Goal/Outcome Evaluation   Problems Present (Hypertensive/in PG) none       Problem:  Delivery (Adult,Obstetrics,Pediatric)  Goal: Signs and Symptoms of Listed Potential Problems Will be Absent, Minimized or Managed ( Delivery)  Outcome: Ongoing (interventions implemented as appropriate)   10/10/19 0001   Goal/Outcome Evaluation   Problems Present ( Delivery) none       Problem: Breastfeeding (Adult,Obstetrics,Pediatric)  Goal: Signs and Symptoms of Listed Potential Problems Will be Absent, Minimized or Managed (Breastfeeding)  Outcome: Ongoing (interventions implemented as appropriate)   10/10/19 0001   Goal/Outcome Evaluation   Problems Present (Breastfeeding) none

## 2019-10-10 NOTE — LACTATION NOTE
Mom reports pumping is going well.  Has P4P contract and will get from NICU today.  Obtained 13 ml and 11 ml EBM this morning.

## 2019-10-11 VITALS
WEIGHT: 209 LBS | DIASTOLIC BLOOD PRESSURE: 80 MMHG | BODY MASS INDEX: 33.59 KG/M2 | OXYGEN SATURATION: 96 % | TEMPERATURE: 98.7 F | HEIGHT: 66 IN | SYSTOLIC BLOOD PRESSURE: 131 MMHG | RESPIRATION RATE: 16 BRPM | HEART RATE: 88 BPM

## 2019-10-11 PROBLEM — Z34.90 PREGNANCY: Status: RESOLVED | Noted: 2019-07-03 | Resolved: 2019-10-11

## 2019-10-11 PROBLEM — O09.512 ELDERLY PRIMIGRAVIDA IN SECOND TRIMESTER: Status: RESOLVED | Noted: 2019-07-03 | Resolved: 2019-10-11

## 2019-10-11 PROBLEM — O35.BXX0 ECHOGENIC FOCUS OF HEART OF FETUS AFFECTING ANTEPARTUM CARE OF MOTHER: Status: RESOLVED | Noted: 2019-07-03 | Resolved: 2019-10-11

## 2019-10-11 PROBLEM — O14.90 PREECLAMPSIA: Status: RESOLVED | Noted: 2019-09-09 | Resolved: 2019-10-11

## 2019-10-11 PROCEDURE — 25010000002 TDAP 5-2.5-18.5 LF-MCG/0.5 SUSPENSION: Performed by: OBSTETRICS & GYNECOLOGY

## 2019-10-11 PROCEDURE — 90471 IMMUNIZATION ADMIN: CPT | Performed by: OBSTETRICS & GYNECOLOGY

## 2019-10-11 PROCEDURE — G0008 ADMIN INFLUENZA VIRUS VAC: HCPCS | Performed by: NURSE PRACTITIONER

## 2019-10-11 PROCEDURE — 25010000002 INFLUENZA VAC SUBUNIT QUAD 0.5 ML SUSPENSION PREFILLED SYRINGE: Performed by: NURSE PRACTITIONER

## 2019-10-11 PROCEDURE — 90674 CCIIV4 VAC NO PRSV 0.5 ML IM: CPT | Performed by: NURSE PRACTITIONER

## 2019-10-11 PROCEDURE — 90715 TDAP VACCINE 7 YRS/> IM: CPT | Performed by: OBSTETRICS & GYNECOLOGY

## 2019-10-11 RX ORDER — OXYCODONE HYDROCHLORIDE AND ACETAMINOPHEN 5; 325 MG/1; MG/1
1-2 TABLET ORAL EVERY 4 HOURS PRN
Qty: 18 TABLET | Refills: 0 | Status: SHIPPED | OUTPATIENT
Start: 2019-10-11 | End: 2019-10-14

## 2019-10-11 RX ORDER — LABETALOL 300 MG/1
300 TABLET, FILM COATED ORAL 3 TIMES DAILY
Qty: 90 TABLET | Refills: 0 | Status: SHIPPED | OUTPATIENT
Start: 2019-10-11 | End: 2021-07-22

## 2019-10-11 RX ORDER — IBUPROFEN 600 MG/1
600 TABLET ORAL EVERY 6 HOURS PRN
Qty: 30 TABLET | Refills: 0 | Status: SHIPPED | OUTPATIENT
Start: 2019-10-11 | End: 2021-07-22

## 2019-10-11 RX ORDER — NIFEDIPINE 30 MG/1
30 TABLET, FILM COATED, EXTENDED RELEASE ORAL 2 TIMES DAILY
Qty: 60 TABLET | Refills: 0 | Status: SHIPPED | OUTPATIENT
Start: 2019-10-11 | End: 2021-07-22

## 2019-10-11 RX ADMIN — TETANUS TOXOID, REDUCED DIPHTHERIA TOXOID AND ACELLULAR PERTUSSIS VACCINE, ADSORBED 0.5 ML: 5; 2.5; 8; 8; 2.5 SUSPENSION INTRAMUSCULAR at 11:18

## 2019-10-11 RX ADMIN — LEVOTHYROXINE SODIUM 62.5 MCG: 25 TABLET ORAL at 05:54

## 2019-10-11 RX ADMIN — LAMOTRIGINE 150 MG: 100 TABLET ORAL at 08:51

## 2019-10-11 RX ADMIN — LABETALOL HYDROCHLORIDE 300 MG: 300 TABLET, FILM COATED ORAL at 06:04

## 2019-10-11 RX ADMIN — OXYCODONE HYDROCHLORIDE AND ACETAMINOPHEN 1 TABLET: 10; 325 TABLET ORAL at 03:39

## 2019-10-11 RX ADMIN — IBUPROFEN 600 MG: 600 TABLET, FILM COATED ORAL at 03:39

## 2019-10-11 RX ADMIN — IBUPROFEN 600 MG: 600 TABLET, FILM COATED ORAL at 11:20

## 2019-10-11 RX ADMIN — NIFEDIPINE 30 MG: 30 TABLET, FILM COATED, EXTENDED RELEASE ORAL at 08:51

## 2019-10-11 RX ADMIN — INFLUENZA A VIRUS A/IDAHO/07/2018 (H1N1) ANTIGEN (MDCK CELL DERIVED, PROPIOLACTONE INACTIVATED, INFLUENZA A VIRUS A/INDIANA/08/2018 (H3N2) ANTIGEN (MDCK CELL DERIVED, PROPIOLACTONE INACTIVATED), INFLUENZA B VIRUS B/SINGAPORE/INFTT-16-0610/2016 ANTIGEN (MDCK CELL DERIVED, PROPIOLACTONE INACTIVATED), INFLUENZA B VIRUS B/IOWA/06/2017 ANTIGEN (MDCK CELL DERIVED, PROPIOLACTONE INACTIVATED) 0.5 ML: 15; 15; 15; 15 INJECTION, SUSPENSION INTRAMUSCULAR at 11:19

## 2019-10-11 RX ADMIN — OXYCODONE HYDROCHLORIDE AND ACETAMINOPHEN 1 TABLET: 5; 325 TABLET ORAL at 08:51

## 2019-10-11 NOTE — DISCHARGE SUMMARY
Discharge Summary    Date of Admission: 2019  Date of Discharge:  10/11/2019      Patient: Bianca Mercado      MR#:[id number]    Primary Surgeon/OB: Monique Dasilva MD      Presenting Problem/History of Present Illness  Preeclampsia [O14.90]       Procedures:  , Low Transverse     10/7/2019    12:26 PM          Hospital Course  Patient is a 34 y.o. female  at 32w1d status post  for preeclampsia. She received Mag and was started on labetalol, then Procardia was added.  Her BP is stable and she denies headache.  Patient was advanced to regular diet on postoperative day#1.  On discharge, ambulating, tolerating a regular diet without any difficulties and her incision is dry, clean and intact.  RTO one week BP check and prn.    Infant:   female  fetus 1560 g (3 lb 7 oz)  with Apgar scores of 7  , 8   at five minutes.    Condition on Discharge:  Stable    Vital Signs  Temp:  [97.6 °F (36.4 °C)-98.7 °F (37.1 °C)] 98.7 °F (37.1 °C)  Heart Rate:  [88-98] 88  Resp:  [16] 16  BP: (130-151)/(73-84) 131/80    Lab Results   Component Value Date    WBC 13.15 (H) 10/10/2019    HGB 10.4 (L) 10/10/2019    HCT 32.9 (L) 10/10/2019    MCV 92.9 10/10/2019     10/10/2019       Discharge Disposition  Home or Self Care    Discharge Medications=  Procardia XL 30 bid also printed     Discharge Medications      New Medications      Instructions Start Date   ibuprofen 600 MG tablet  Commonly known as:  ADVIL,MOTRIN   600 mg, Oral, Every 6 Hours PRN      oxyCODONE-acetaminophen 5-325 MG per tablet  Commonly known as:  PERCOCET   1-2 tablets, Oral, Every 4 Hours PRN         Changes to Medications      Instructions Start Date   labetalol 300 MG tablet  Commonly known as:  NORMODYNE  What changed:    · medication strength  · how much to take  · when to take this   300 mg, Oral, 3 Times Daily         Continue These Medications      Instructions Start Date   clonazePAM 0.5 MG tablet  Commonly known as:   KlonoPIN   No dose, route, or frequency recorded.      lamoTRIgine 100 MG tablet  Commonly known as:  LaMICtal   150 mg, Daily      levothyroxine 125 MCG tablet  Commonly known as:  SYNTHROID, LEVOTHROID   62.5 mcg      OXcarbazepine 300 MG tablet  Commonly known as:  TRILEPTAL   No dose, route, or frequency recorded.      Prenatal 27-1 27-1 MG tablet tablet   Oral, Daily         Stop These Medications    l-methylfolate 15 MG tablet tablet            Discharge Diet:     Activity at Discharge:     Follow-up Appointments  No future appointments.  Additional Instructions for the Follow-ups that You Need to Schedule     Discharge Follow-up with Specified Provider: marina; 1 Week   As directed      To:  marina    Follow Up:  1 Week    Follow Up Details:  bp check               Mitali Garrett, GALLITO  10/11/19  9:11 AM  Csd

## 2019-10-11 NOTE — DISCHARGE INSTR - APPOINTMENTS
Follow up in 1 week with Dr. Dasilva on Friday, October 18th at 10:20 in her [place] office.   normal affect/normal behavior

## 2020-12-17 ENCOUNTER — DOCUMENTATION (OUTPATIENT)
Dept: OBSTETRICS AND GYNECOLOGY | Facility: CLINIC | Age: 35
End: 2020-12-17

## 2020-12-17 ENCOUNTER — LAB (OUTPATIENT)
Dept: OBSTETRICS AND GYNECOLOGY | Facility: CLINIC | Age: 35
End: 2020-12-17

## 2020-12-17 DIAGNOSIS — O26.892 RH NEGATIVE STATUS DURING PREGNANCY IN SECOND TRIMESTER: ICD-10-CM

## 2020-12-17 DIAGNOSIS — Z67.91 RH NEGATIVE STATUS DURING PREGNANCY IN SECOND TRIMESTER: ICD-10-CM

## 2020-12-17 PROCEDURE — 96372 THER/PROPH/DIAG INJ SC/IM: CPT | Performed by: OBSTETRICS & GYNECOLOGY

## 2020-12-17 NOTE — PROGRESS NOTES
Patient came in for Rhogam injection. Per Dr Awadalla she had MAB this morning. They were out of Rhogam so she came here.   She was given Rhoigam in rt buttock.   LOT#[id number] EXP 11/22/2027  NDC [id number]

## 2021-05-07 ENCOUNTER — TELEPHONE (OUTPATIENT)
Dept: ENDOCRINOLOGY | Facility: CLINIC | Age: 36
End: 2021-05-07

## 2021-05-07 DIAGNOSIS — E22.1 HYPERPROLACTINEMIA (HCC): Primary | ICD-10-CM

## 2021-05-07 DIAGNOSIS — E03.9 ACQUIRED HYPOTHYROIDISM: ICD-10-CM

## 2021-05-20 DIAGNOSIS — E22.1 HYPERPROLACTINEMIA (HCC): ICD-10-CM

## 2021-05-20 DIAGNOSIS — E03.9 ACQUIRED HYPOTHYROIDISM: ICD-10-CM

## 2021-07-14 ENCOUNTER — TELEPHONE (OUTPATIENT)
Dept: ENDOCRINOLOGY | Facility: CLINIC | Age: 36
End: 2021-07-14

## 2021-07-16 ENCOUNTER — TELEPHONE (OUTPATIENT)
Dept: ENDOCRINOLOGY | Facility: CLINIC | Age: 36
End: 2021-07-16

## 2021-07-22 ENCOUNTER — OFFICE VISIT (OUTPATIENT)
Dept: ENDOCRINOLOGY | Facility: CLINIC | Age: 36
End: 2021-07-22

## 2021-07-22 VITALS
WEIGHT: 193 LBS | BODY MASS INDEX: 31.02 KG/M2 | SYSTOLIC BLOOD PRESSURE: 126 MMHG | OXYGEN SATURATION: 100 % | DIASTOLIC BLOOD PRESSURE: 78 MMHG | HEIGHT: 66 IN | HEART RATE: 85 BPM

## 2021-07-22 DIAGNOSIS — E03.9 ACQUIRED HYPOTHYROIDISM: Primary | ICD-10-CM

## 2021-07-22 DIAGNOSIS — E04.0 SIMPLE GOITER: ICD-10-CM

## 2021-07-22 PROCEDURE — 99214 OFFICE O/P EST MOD 30 MIN: CPT | Performed by: INTERNAL MEDICINE

## 2021-07-22 RX ORDER — LEVOTHYROXINE SODIUM 0.07 MG/1
75 TABLET ORAL DAILY
Qty: 90 TABLET | Refills: 3 | Status: SHIPPED | OUTPATIENT
Start: 2021-07-22 | End: 2021-09-29 | Stop reason: SDUPTHER

## 2021-07-22 NOTE — PROGRESS NOTES
"     Office Note      Date: 2021  Patient Name: Bianca Mercado  MRN: [id number]  : 1985    Chief Complaint   Patient presents with   • Hypothyroidism       History of Present Illness:   Bianca Mercado is a 36 y.o. female who presents for Hypothyroidism  SHE IS ON 68.5 MCG OF LT4 EACH DAY AND LAST WEEK HER TSH WAS 6+  SHE IS GETTING FERTILITY TREATMENTS- IVF.    SHE HAS BEEN TIRED.  SHE NOTES TROUBLE SLEEPING AND ANXIET .    Subjective          Review of Systems:   Review of Systems   Constitutional: Positive for fatigue.   Psychiatric/Behavioral: Positive for dysphoric mood and sleep disturbance. The patient is nervous/anxious.        The following portions of the patient's history were reviewed and updated as appropriate: allergies, current medications, past family history, past medical history, past social history, past surgical history and problem list.    Objective     Visit Vitals  /78 (BP Location: Left arm, Patient Position: Sitting, Cuff Size: Adult)   Pulse 85   Ht 167.6 cm (66\")   Wt 87.5 kg (193 lb)   LMP  (LMP Unknown)   SpO2 100%   Breastfeeding No   BMI 31.15 kg/m²       Labs:    CBC w/DIFF  Lab Results   Component Value Date    WBC 13.15 (H) 10/10/2019    RBC 3.54 (L) 10/10/2019    HGB 10.4 (L) 10/10/2019    HCT 32.9 (L) 10/10/2019    MCV 92.9 10/10/2019    MCH 29.4 10/10/2019    MCHC 31.6 10/10/2019    RDW 13.8 10/10/2019    RDWSD 46.4 10/10/2019    MPV 11.4 10/10/2019     10/10/2019    NEUTRORELPCT 77.5 (H) 10/08/2019    LYMPHORELPCT 13.7 (L) 10/08/2019    MONORELPCT 6.9 10/08/2019    EOSRELPCT 0.7 10/08/2019    BASORELPCT 0.2 10/08/2019    AUTOIGPER 1.0 (H) 10/08/2019    NEUTROABS 12.69 (H) 10/08/2019    LYMPHSABS 2.25 10/08/2019    MONOSABS 1.13 (H) 10/08/2019    EOSABS 0.11 10/08/2019    BASOSABS 0.03 10/08/2019    AUTOIGNUM 0.16 (H) 10/08/2019    NRBC 0.1 10/08/2019       T4  No results found for: FREET4    TSH  No results found for: TSHBASE     Physical " Exam:  Physical Exam  Vitals reviewed.   Constitutional:       Appearance: Normal appearance.   Eyes:      Extraocular Movements: Extraocular movements intact.   Neck:      Comments: DIFFUSELY ENLARGED THYROID   Cardiovascular:      Rate and Rhythm: Normal rate and regular rhythm.   Lymphadenopathy:      Cervical: No cervical adenopathy.   Neurological:      Mental Status: She is alert.   Psychiatric:         Mood and Affect: Mood normal.         Thought Content: Thought content normal.         Judgment: Judgment normal.         Assessment / Plan      Assessment & Plan:  Problem List Items Addressed This Visit        Other    Acquired hypothyroidism - Primary    Current Assessment & Plan     DETERIORATED WITH HIGHER TSH  WILL INCREASE TO 75 MCG PER DAY          Relevant Medications    levothyroxine (Synthroid) 75 MCG tablet           Mark Walker MD   07/22/2021

## 2021-09-29 DIAGNOSIS — E03.9 ACQUIRED HYPOTHYROIDISM: ICD-10-CM

## 2021-09-29 RX ORDER — LEVOTHYROXINE SODIUM 0.07 MG/1
75 TABLET ORAL DAILY
Qty: 90 TABLET | Refills: 3 | Status: SHIPPED | OUTPATIENT
Start: 2021-09-29 | End: 2022-11-08 | Stop reason: SDUPTHER

## 2021-11-02 ENCOUNTER — INITIAL PRENATAL (OUTPATIENT)
Dept: OBSTETRICS AND GYNECOLOGY | Facility: CLINIC | Age: 36
End: 2021-11-02

## 2021-11-02 VITALS — BODY MASS INDEX: 31.15 KG/M2 | DIASTOLIC BLOOD PRESSURE: 80 MMHG | WEIGHT: 193 LBS | SYSTOLIC BLOOD PRESSURE: 130 MMHG

## 2021-11-02 DIAGNOSIS — O09.529 ANTEPARTUM MULTIGRAVIDA OF ADVANCED MATERNAL AGE: ICD-10-CM

## 2021-11-02 DIAGNOSIS — Z67.91 RH NEGATIVE, ANTEPARTUM: ICD-10-CM

## 2021-11-02 DIAGNOSIS — O26.899 RH NEGATIVE, ANTEPARTUM: ICD-10-CM

## 2021-11-02 DIAGNOSIS — E07.9 THYROID DISEASE: ICD-10-CM

## 2021-11-02 DIAGNOSIS — Z3A.11 11 WEEKS GESTATION OF PREGNANCY: Primary | ICD-10-CM

## 2021-11-02 DIAGNOSIS — N96 HISTORY OF RECURRENT MISCARRIAGES: ICD-10-CM

## 2021-11-02 PROCEDURE — 96372 THER/PROPH/DIAG INJ SC/IM: CPT | Performed by: OBSTETRICS & GYNECOLOGY

## 2021-11-02 PROCEDURE — 0501F PRENATAL FLOW SHEET: CPT | Performed by: NURSE PRACTITIONER

## 2021-11-02 NOTE — PROGRESS NOTES
Initial ob visit     CC- Here for care of pregnancy        Bianca Mercado is a 36 y.o. female, , who presents for her first obstetrical visit.  Her last LMP was No LMP recorded (lmp unknown). Patient is pregnant..    # 1 - Date: , Sex: Unknown, Weight: None, GA: 6w0d, Delivery: None, Apgar1: None, Apgar5: None, Living: None, Birth Comments: None    # 2 - Date: , Sex: Unknown, Weight: None, GA: 8w0d, Delivery: None, Apgar1: None, Apgar5: None, Living: None, Birth Comments: None    # 3 - Date: 10/07/19, Sex: Female, Weight: 1560 g (3 lb 7 oz), GA: 32w1d, Delivery: , Low Transverse, Apgar1: 7, Apgar5: 8, Living: Living, Birth Comments: None    # 4 - Date: 20, Sex: None, Weight: None, GA: 7w0d, Delivery: Spontaneous , Apgar1: None, Apgar5: None, Living: None, Birth Comments: None    # 5 - Date: None, Sex: None, Weight: None, GA: None, Delivery: None, Apgar1: None, Apgar5: None, Living: None, Birth Comments: None      Current obstetric complaints : She has had mild cramping and passed 2 marble sized clots.  C/o nausea   Patient underwent IVF with sperm donor, retrieval on  by Dr. Awadalla    Prior obstetric issues: pt had a 32wk c/s for preeclampsia, RH negative, anxiety and depression  Potential pregnancy concerns:  Previous c/s, h/o preeclampsia, R negative, anxiety and depression, AMA, h/o ectopic, thyroid disease, h/o SAB x 2  Family history of genetic issues (includes FOB): denies   Prior infections concerning in pregnancy (Rash, fever in last 2 weeks): denies   Varicella Hx -yes   Prior testing for Cystic Fibrosis Carrier or Sickle Cell Trait- no  Prepregnancy BMI - Body mass index is 31.15 kg/m².  Hx of HSV for patient or partner : denies     Additional Pertinent History   Last Pap :   Last Completed Pap Smear     This patient has no relevant Health Maintenance data.        History of abnormal Pap smear: no  Family history of uterine, colon, breast, or ovarian cancer:  yes - father had colon cancer and mother had breast  Exercises Regularly: yes - .  Feelings of Anxiety or Depression: yes - .  Tobacco Usage?: No     The additional following portions of the patient's history were reviewed and updated as appropriate: allergies, current medications, past family history, past medical history, past social history, past surgical history and problem list.    Review of Systems   Review of Systems   Genitourinary: Menstrual problem: PDC 4 wks then here; start daily bASA.     Constitutional : Nausea, fatigue :admits   : Vaginal bleeding, cramping :admits  Breast Tenderness : admits  All systems reviewed and negative    /80   Wt 87.5 kg (193 lb)   LMP  (LMP Unknown)   BMI 31.15 kg/m²     Physical Exam  General Appearance:    Alert, cooperative, in no acute distress   Head:    Normocephalic, without obvious abnormality, atraumatic   Eyes:            Lids and lashes normal, conjunctivae and sclerae normal, no icterus, no pallor, corneas clear   Ears:    Ears appear intact with no abnormalities noted       Neck:      Neck without masses or thyromegaly    Abdomen:    Soft without masses or tenderness   :           Neck:   No adenopathy, supple, trachea midline, no thyromegaly   Back:     No kyphosis present, no scoliosis present,                       Extremities:   Moves all extremities well, no edema, no cyanosis   Skin:   No bleeding, bruising or rash   Lymph nodes:   No palpable adenopathy   Neurologic:   Sensation intact, A&O times 3        Assessment/Plan   Assessment     Problem List Items Addressed This Visit        Gravid and     Rh negative, antepartum    Overview     Rhogam given 21         Relevant Orders    Rhogam Immune Globulin Immunization (Completed)      Other Visit Diagnoses     11 weeks gestation of pregnancy    -  Primary    Relevant Orders    OB Panel With HIV    Preeclampsia Panel    Urine Culture - Urine, Urine, Clean Catch    Urine Drug Screen  - Urine, Clean Catch    Urinalysis With Microscopic - Urine, Clean Catch    Chlamydia trachomatis, Neisseria gonorrhoeae, PCR w/ confirmation - Urine, Urine, Clean Catch    [id number] PLUS Core+SCA+ESS - Blood,    Antepartum multigravida of advanced maternal age        Relevant Orders    [id number] PLUS Core+SCA+ESS - Blood,    [facility] [place]    History of recurrent miscarriages        Relevant Orders    [id number] PLUS Core+SCA+ESS - Blood,    Thyroid disease        Relevant Orders    TSH          1. Pregnancy at 11w6d      Plan     1. Start daily bASA  2. Counseled on genetic testing options including CF DNA, carrier testing including CF, SMA, and Fragile X,  and NT screening.  3. Follow up: 4 week(s)  4. After PDC      Mitali Garrett, APRN  2021

## 2021-11-03 LAB — TSH SERPL DL<=0.005 MIU/L-ACNC: 1.29 UIU/ML (ref 0.27–4.2)

## 2021-11-05 LAB
ABO GROUP BLD: NORMAL
ALBUMIN/CREAT UR: 96 MG/G CREAT (ref 0–29)
ALT SERPL-CCNC: 13 IU/L (ref 0–32)
AMPHETAMINES UR QL SCN: NEGATIVE NG/ML
APPEARANCE UR: CLEAR
AST SERPL-CCNC: 14 IU/L (ref 0–40)
BACTERIA #/AREA URNS HPF: NORMAL /[HPF]
BACTERIA UR CULT: NORMAL
BACTERIA UR CULT: NORMAL
BARBITURATES UR QL SCN: NEGATIVE NG/ML
BASOPHILS # BLD AUTO: 0 X10E3/UL (ref 0–0.2)
BASOPHILS NFR BLD AUTO: 0 %
BENZODIAZ UR QL SCN: NEGATIVE NG/ML
BILIRUB UR QL STRIP: NEGATIVE
BLD GP AB SCN SERPL QL: NEGATIVE
BLD GP AB SCN SERPL QL: NORMAL
BLOOD GROUP ANTIBODIES SERPL: NORMAL
BUN SERPL-MCNC: 6 MG/DL (ref 6–20)
BZE UR QL SCN: NEGATIVE NG/ML
C TRACH RRNA SPEC QL NAA+PROBE: NEGATIVE
CANNABINOIDS UR QL SCN: NEGATIVE NG/ML
CASTS URNS QL MICRO: NORMAL /LPF
COLOR UR: YELLOW
CREAT SERPL-MCNC: 0.55 MG/DL (ref 0.57–1)
CREAT UR-MCNC: 19.2 MG/DL
CREAT UR-MCNC: 23.4 MG/DL (ref 20–300)
EOSINOPHIL # BLD AUTO: 0.1 X10E3/UL (ref 0–0.4)
EOSINOPHIL NFR BLD AUTO: 1 %
EPI CELLS #/AREA URNS HPF: NORMAL /HPF (ref 0–10)
ERYTHROCYTE [DISTWIDTH] IN BLOOD BY AUTOMATED COUNT: 13.2 % (ref 11.7–15.4)
GLUCOSE UR QL: NEGATIVE
HBV SURFACE AG SERPL QL IA: NEGATIVE
HCT VFR BLD AUTO: 38.1 % (ref 34–46.6)
HCV AB S/CO SERPL IA: <0.1 S/CO RATIO (ref 0–0.9)
HGB BLD-MCNC: 12.6 G/DL (ref 11.1–15.9)
HGB UR QL STRIP: ABNORMAL
HIV 1+2 AB+HIV1 P24 AG SERPL QL IA: NON REACTIVE
IMM GRANULOCYTES # BLD AUTO: 0.1 X10E3/UL (ref 0–0.1)
IMM GRANULOCYTES NFR BLD AUTO: 1 %
KETONES UR QL STRIP: NEGATIVE
LABORATORY COMMENT REPORT: NORMAL
LDH SERPL-CCNC: 135 IU/L (ref 119–226)
LEUKOCYTE ESTERASE UR QL STRIP: NEGATIVE
LYMPHOCYTES # BLD AUTO: 1.1 X10E3/UL (ref 0.7–3.1)
LYMPHOCYTES NFR BLD AUTO: 13 %
MCH RBC QN AUTO: 28.8 PG (ref 26.6–33)
MCHC RBC AUTO-ENTMCNC: 33.1 G/DL (ref 31.5–35.7)
MCV RBC AUTO: 87 FL (ref 79–97)
METHADONE UR QL SCN: NEGATIVE NG/ML
MICRO URNS: ABNORMAL
MICROALBUMIN UR-MCNC: 18.4 UG/ML
MONOCYTES # BLD AUTO: 0.5 X10E3/UL (ref 0.1–0.9)
MONOCYTES NFR BLD AUTO: 6 %
N GONORRHOEA RRNA SPEC QL NAA+PROBE: NEGATIVE
NEUTROPHILS # BLD AUTO: 7.1 X10E3/UL (ref 1.4–7)
NEUTROPHILS NFR BLD AUTO: 79 %
NITRITE UR QL STRIP: NEGATIVE
OPIATES UR QL SCN: NEGATIVE NG/ML
OXYCODONE+OXYMORPHONE UR QL SCN: NEGATIVE NG/ML
PCP UR QL: NEGATIVE NG/ML
PH UR STRIP: 6.5 [PH] (ref 5–7.5)
PH UR: 6.6 [PH] (ref 4.5–8.9)
PLATELET # BLD AUTO: 343 X10E3/UL (ref 150–450)
PROPOXYPH UR QL SCN: NEGATIVE NG/ML
PROT UR QL STRIP: NEGATIVE
RBC # BLD AUTO: 4.37 X10E6/UL (ref 3.77–5.28)
RBC #/AREA URNS HPF: NORMAL /HPF (ref 0–2)
RH BLD: NEGATIVE
RPR SER QL: NON REACTIVE
RUBV IGG SERPL IA-ACNC: 1.99 INDEX
SP GR UR: 1 (ref 1–1.03)
URATE SERPL-MCNC: 3.7 MG/DL (ref 2.6–6.2)
UROBILINOGEN UR STRIP-MCNC: 0.2 MG/DL (ref 0.2–1)
WBC # BLD AUTO: 8.9 X10E3/UL (ref 3.4–10.8)
WBC #/AREA URNS HPF: NORMAL /HPF (ref 0–5)

## 2021-11-29 ENCOUNTER — TELEPHONE (OUTPATIENT)
Dept: OBSTETRICS AND GYNECOLOGY | Facility: CLINIC | Age: 36
End: 2021-11-29

## 2021-11-30 ENCOUNTER — OFFICE VISIT (OUTPATIENT)
Dept: OBSTETRICS AND GYNECOLOGY | Facility: HOSPITAL | Age: 36
End: 2021-11-30

## 2021-11-30 ENCOUNTER — HOSPITAL ENCOUNTER (OUTPATIENT)
Dept: WOMENS IMAGING | Facility: HOSPITAL | Age: 36
Discharge: HOME | End: 2021-11-30
Admitting: NURSE PRACTITIONER

## 2021-11-30 ENCOUNTER — ROUTINE PRENATAL (OUTPATIENT)
Dept: OBSTETRICS AND GYNECOLOGY | Facility: CLINIC | Age: 36
End: 2021-11-30

## 2021-11-30 VITALS — WEIGHT: 195.6 LBS | BODY MASS INDEX: 31.57 KG/M2 | DIASTOLIC BLOOD PRESSURE: 74 MMHG | SYSTOLIC BLOOD PRESSURE: 131 MMHG

## 2021-11-30 VITALS — DIASTOLIC BLOOD PRESSURE: 80 MMHG | WEIGHT: 195 LBS | SYSTOLIC BLOOD PRESSURE: 136 MMHG | BODY MASS INDEX: 31.47 KG/M2

## 2021-11-30 DIAGNOSIS — E03.9 ACQUIRED HYPOTHYROIDISM: Primary | ICD-10-CM

## 2021-11-30 DIAGNOSIS — Z3A.15 15 WEEKS GESTATION OF PREGNANCY: ICD-10-CM

## 2021-11-30 DIAGNOSIS — F41.9 ANXIETY: ICD-10-CM

## 2021-11-30 DIAGNOSIS — O09.529 ANTEPARTUM MULTIGRAVIDA OF ADVANCED MATERNAL AGE: ICD-10-CM

## 2021-11-30 DIAGNOSIS — O09.299 HX OF PREECLAMPSIA, PRIOR PREGNANCY, CURRENTLY PREGNANT: ICD-10-CM

## 2021-11-30 DIAGNOSIS — E03.9 ACQUIRED HYPOTHYROIDISM: ICD-10-CM

## 2021-11-30 DIAGNOSIS — Z98.891 PREVIOUS CESAREAN SECTION: ICD-10-CM

## 2021-11-30 DIAGNOSIS — O26.899 RH NEGATIVE, ANTEPARTUM: ICD-10-CM

## 2021-11-30 DIAGNOSIS — O09.522 MULTIGRAVIDA OF ADVANCED MATERNAL AGE IN SECOND TRIMESTER: ICD-10-CM

## 2021-11-30 DIAGNOSIS — Z67.91 RH NEGATIVE, ANTEPARTUM: ICD-10-CM

## 2021-11-30 PROCEDURE — 76815 OB US LIMITED FETUS(S): CPT | Performed by: OBSTETRICS & GYNECOLOGY

## 2021-11-30 PROCEDURE — 76815 OB US LIMITED FETUS(S): CPT

## 2021-11-30 PROCEDURE — 0502F SUBSEQUENT PRENATAL CARE: CPT | Performed by: OBSTETRICS & GYNECOLOGY

## 2021-11-30 RX ORDER — ASPIRIN 81 MG/1
81 TABLET ORAL DAILY
COMMUNITY
End: 2022-04-07

## 2021-11-30 RX ORDER — AMOXICILLIN 875 MG/1
1 TABLET, COATED ORAL 2 TIMES DAILY
COMMUNITY
Start: 2021-11-28 | End: 2022-03-04

## 2021-11-30 NOTE — PROGRESS NOTES
Documentation of the ultasound findings, images, and interpretations will be available in the patient's Viewpoint report located in the Chart Review Imaging tab in [employer].

## 2021-11-30 NOTE — PROGRESS NOTES
IVF, fresh cycle with donor sperm. Same donor as last 2 pregnancies. Denies problems. Reports had NIPS with normal results. Next OB visit is today.

## 2021-11-30 NOTE — PROGRESS NOTES
OB FOLLOW UP  CC- Here for care of pregnancy        Bianca Mercado is a 36 y.o.  15w6d patient being seen today for her obstetrical follow up visit. Patient reports no complaints. cfdna neg/boy.  Dr. Iverson recommends 24h urine and AFP only.    Her prenatal care is complicated by (and status) :    Patient Active Problem List   Diagnosis   • Acquired hypothyroidism   • Simple goiter   • Rh negative, antepartum   • Hx of preeclampsia, prior pregnancy, currently pregnant   • Anxiety   • Depression   • [place] product of in vitro fertilization (IVF) pregnancy   • Multigravida of advanced maternal age in second trimester   • Previous  section       Flu Status: Will get at work/pharmacy/other facility   Ultrasound Today: Yes at [facility].  Findings showed placenta is anterior with the inferior edge reaching the  lower uterine segment, s=d        ROS -   Patient Denies: Loss of Fluid, Vaginal Spotting, Vision Changes, Headaches, Nausea , Vomiting  and Contractions  All other systems reviewed and are negative.       The additional following portions of the patient's history were reviewed and updated as appropriate: allergies, current medications, past family history, past medical history, past social history, past surgical history and problem list.    I have reviewed and agree with the HPI, ROS, and historical information as entered above. Monique Dasilva MD    /80   Wt 88.5 kg (195 lb)   LMP  (LMP Unknown)   BMI 31.47 kg/m²       EXAM:     FHT: + BPM       Urine glucose/protein: See prenatal flowsheet       Assessment and Plan    Problem List Items Addressed This Visit     Acquired hypothyroidism    Overview     TSH at nob wnl         Relevant Medications    levothyroxine (Synthroid) 75 MCG tablet    Rh negative, antepartum    Overview     Rhogam given 21         Hx of preeclampsia, prior pregnancy, currently pregnant    Overview     Baby asa  Baseline 24h urine         Anxiety    Overview      Mood disorder  On lamictal, trileptal at nob. Followed by psych- stable         [place] product of in vitro fertilization (IVF) pregnancy - Primary    Current Assessment & Plan     Fresh cycle, sperm donor          Multigravida of advanced maternal age in second trimester    Overview     cfDNA low risk  AFP         Previous  section    Overview     32 wks           Other Visit Diagnoses     15 weeks gestation of pregnancy        Relevant Orders    POC Urinalysis Dipstick    Alpha Fetoprotein, Maternal    Protein, Urine, 24 Hour - Urine, Clean Catch          1. Pregnancy at 15w6d. [facility] US today. Has susan US sched.   2. cfDNA low risk, AFP only today.   3. She is on babyt asa, and home today with 24h urine.   4. Fetal status reassuring.   5. Activity and Exercise discussed.  Return in about 1 month (around 2021) for F/U Prenatal.    Monique Dasilva MD  2021

## 2021-12-02 LAB
AFP ADJ MOM SERPL: 0.71
AFP INTERP SERPL-IMP: NORMAL
AFP INTERP SERPL-IMP: NORMAL
AFP SERPL-MCNC: 20.1 NG/ML
AGE AT DELIVERY: 38.1 YR
GA METHOD: NORMAL
GA: 15.9 WEEKS
IDDM PATIENT QL: NO
LABORATORY COMMENT REPORT: NORMAL
MULTIPLE PREGNANCY: NO
NEURAL TUBE DEFECT RISK FETUS: NORMAL %
RESULT: NORMAL

## 2022-01-04 ENCOUNTER — OFFICE VISIT (OUTPATIENT)
Dept: OBSTETRICS AND GYNECOLOGY | Facility: HOSPITAL | Age: 37
End: 2022-01-04

## 2022-01-04 ENCOUNTER — HOSPITAL ENCOUNTER (OUTPATIENT)
Dept: WOMENS IMAGING | Facility: HOSPITAL | Age: 37
Discharge: HOME | End: 2022-01-04
Admitting: OBSTETRICS & GYNECOLOGY

## 2022-01-04 ENCOUNTER — ROUTINE PRENATAL (OUTPATIENT)
Dept: OBSTETRICS AND GYNECOLOGY | Facility: CLINIC | Age: 37
End: 2022-01-04

## 2022-01-04 VITALS
HEART RATE: 95 BPM | DIASTOLIC BLOOD PRESSURE: 81 MMHG | BODY MASS INDEX: 32.6 KG/M2 | WEIGHT: 202 LBS | SYSTOLIC BLOOD PRESSURE: 160 MMHG

## 2022-01-04 VITALS — BODY MASS INDEX: 32.28 KG/M2 | WEIGHT: 200 LBS | SYSTOLIC BLOOD PRESSURE: 150 MMHG | DIASTOLIC BLOOD PRESSURE: 80 MMHG

## 2022-01-04 DIAGNOSIS — E03.9 ACQUIRED HYPOTHYROIDISM: ICD-10-CM

## 2022-01-04 DIAGNOSIS — O09.299 HX OF PREECLAMPSIA, PRIOR PREGNANCY, CURRENTLY PREGNANT: ICD-10-CM

## 2022-01-04 DIAGNOSIS — O26.899 RH NEGATIVE, ANTEPARTUM: ICD-10-CM

## 2022-01-04 DIAGNOSIS — Z67.91 RH NEGATIVE, ANTEPARTUM: ICD-10-CM

## 2022-01-04 DIAGNOSIS — O09.522 MULTIGRAVIDA OF ADVANCED MATERNAL AGE IN SECOND TRIMESTER: ICD-10-CM

## 2022-01-04 DIAGNOSIS — Z3A.20 20 WEEKS GESTATION OF PREGNANCY: Primary | ICD-10-CM

## 2022-01-04 DIAGNOSIS — F41.9 ANXIETY: ICD-10-CM

## 2022-01-04 DIAGNOSIS — Z98.891 PREVIOUS CESAREAN SECTION: ICD-10-CM

## 2022-01-04 PROBLEM — O10.919 CHRONIC HYPERTENSION AFFECTING PREGNANCY: Status: ACTIVE | Noted: 2022-01-04

## 2022-01-04 LAB
GLUCOSE UR STRIP-MCNC: NEGATIVE MG/DL
PROT UR STRIP-MCNC: NEGATIVE MG/DL

## 2022-01-04 PROCEDURE — 0502F SUBSEQUENT PRENATAL CARE: CPT | Performed by: OBSTETRICS & GYNECOLOGY

## 2022-01-04 PROCEDURE — 99214 OFFICE O/P EST MOD 30 MIN: CPT | Performed by: OBSTETRICS & GYNECOLOGY

## 2022-01-04 PROCEDURE — 76811 OB US DETAILED SNGL FETUS: CPT

## 2022-01-04 PROCEDURE — 76811 OB US DETAILED SNGL FETUS: CPT | Performed by: OBSTETRICS & GYNECOLOGY

## 2022-01-04 RX ORDER — LABETALOL 200 MG/1
200 TABLET, FILM COATED ORAL 2 TIMES DAILY
Qty: 60 TABLET | Refills: 6 | Status: SHIPPED | OUTPATIENT
Start: 2022-01-04 | End: 2022-02-22

## 2022-01-04 NOTE — PROGRESS NOTES
Patient seen in Maternal Fetal Medicine clinic today. Please see full note in under imaging tab of patient chart in [employer] (Viewpoint report).    Josefina Cain MD

## 2022-01-05 LAB
ALT SERPL-CCNC: 14 IU/L (ref 0–32)
AST SERPL-CCNC: 14 IU/L (ref 0–40)
BASOPHILS # BLD AUTO: 0 X10E3/UL (ref 0–0.2)
BASOPHILS NFR BLD AUTO: 0 %
BUN SERPL-MCNC: 4 MG/DL (ref 6–20)
CREAT SERPL-MCNC: 0.51 MG/DL (ref 0.57–1)
CREAT UR-MCNC: ABNORMAL MG/DL
EOSINOPHIL # BLD AUTO: 0.2 X10E3/UL (ref 0–0.4)
EOSINOPHIL NFR BLD AUTO: 1 %
ERYTHROCYTE [DISTWIDTH] IN BLOOD BY AUTOMATED COUNT: 13.1 % (ref 11.7–15.4)
HCT VFR BLD AUTO: 34.8 % (ref 34–46.6)
HGB BLD-MCNC: 11.9 G/DL (ref 11.1–15.9)
IMM GRANULOCYTES # BLD AUTO: 0.1 X10E3/UL (ref 0–0.1)
IMM GRANULOCYTES NFR BLD AUTO: 1 %
LDH SERPL-CCNC: 129 IU/L (ref 119–226)
LYMPHOCYTES # BLD AUTO: 1.5 X10E3/UL (ref 0.7–3.1)
LYMPHOCYTES NFR BLD AUTO: 13 %
Lab: NORMAL
MCH RBC QN AUTO: 29.7 PG (ref 26.6–33)
MCHC RBC AUTO-ENTMCNC: 34.2 G/DL (ref 31.5–35.7)
MCV RBC AUTO: 87 FL (ref 79–97)
MICROALBUMIN UR-MCNC: ABNORMAL
MONOCYTES # BLD AUTO: 0.6 X10E3/UL (ref 0.1–0.9)
MONOCYTES NFR BLD AUTO: 6 %
NEUTROPHILS # BLD AUTO: 8.9 X10E3/UL (ref 1.4–7)
NEUTROPHILS NFR BLD AUTO: 79 %
PLATELET # BLD AUTO: 303 X10E3/UL (ref 150–450)
PROT 24H UR-MRATE: 278 MG/24 HR (ref 30–150)
PROT UR-MCNC: 15.9 MG/DL
RBC # BLD AUTO: 4.01 X10E6/UL (ref 3.77–5.28)
SPECIMEN STATUS: NORMAL
URATE SERPL-MCNC: 3.6 MG/DL (ref 2.6–6.2)
WBC # BLD AUTO: 11.3 X10E3/UL (ref 3.4–10.8)

## 2022-01-07 ENCOUNTER — TELEPHONE (OUTPATIENT)
Dept: OBSTETRICS AND GYNECOLOGY | Facility: CLINIC | Age: 37
End: 2022-01-07

## 2022-02-08 ENCOUNTER — HOSPITAL ENCOUNTER (OUTPATIENT)
Dept: WOMENS IMAGING | Facility: HOSPITAL | Age: 37
Discharge: HOME | End: 2022-02-08
Admitting: OBSTETRICS & GYNECOLOGY

## 2022-02-08 ENCOUNTER — ROUTINE PRENATAL (OUTPATIENT)
Dept: OBSTETRICS AND GYNECOLOGY | Facility: CLINIC | Age: 37
End: 2022-02-08

## 2022-02-08 ENCOUNTER — OFFICE VISIT (OUTPATIENT)
Dept: OBSTETRICS AND GYNECOLOGY | Facility: HOSPITAL | Age: 37
End: 2022-02-08

## 2022-02-08 VITALS — DIASTOLIC BLOOD PRESSURE: 77 MMHG | BODY MASS INDEX: 32.93 KG/M2 | WEIGHT: 204 LBS | SYSTOLIC BLOOD PRESSURE: 138 MMHG

## 2022-02-08 VITALS — DIASTOLIC BLOOD PRESSURE: 78 MMHG | SYSTOLIC BLOOD PRESSURE: 130 MMHG

## 2022-02-08 DIAGNOSIS — Z98.891 PREVIOUS CESAREAN SECTION: ICD-10-CM

## 2022-02-08 DIAGNOSIS — O10.919 CHRONIC HYPERTENSION AFFECTING PREGNANCY: ICD-10-CM

## 2022-02-08 DIAGNOSIS — O10.919 CHRONIC HYPERTENSION AFFECTING PREGNANCY: Primary | ICD-10-CM

## 2022-02-08 DIAGNOSIS — Z67.91 RH NEGATIVE, ANTEPARTUM: ICD-10-CM

## 2022-02-08 DIAGNOSIS — O09.522 MULTIGRAVIDA OF ADVANCED MATERNAL AGE IN SECOND TRIMESTER: ICD-10-CM

## 2022-02-08 DIAGNOSIS — O09.812 PREGNANCY RESULTING FROM ASSISTED REPRODUCTIVE TECHNOLOGY IN SECOND TRIMESTER: ICD-10-CM

## 2022-02-08 DIAGNOSIS — Z3A.25 25 WEEKS GESTATION OF PREGNANCY: Primary | ICD-10-CM

## 2022-02-08 DIAGNOSIS — O09.299 HX OF PREECLAMPSIA, PRIOR PREGNANCY, CURRENTLY PREGNANT: ICD-10-CM

## 2022-02-08 DIAGNOSIS — O26.899 RH NEGATIVE, ANTEPARTUM: ICD-10-CM

## 2022-02-08 DIAGNOSIS — F41.9 ANXIETY: ICD-10-CM

## 2022-02-08 LAB
EXPIRATION DATE: 0
GLUCOSE UR STRIP-MCNC: NEGATIVE MG/DL
Lab: 0
PROT UR STRIP-MCNC: NEGATIVE MG/DL

## 2022-02-08 PROCEDURE — 76825 ECHO EXAM OF FETAL HEART: CPT

## 2022-02-08 PROCEDURE — 76825 ECHO EXAM OF FETAL HEART: CPT | Performed by: OBSTETRICS & GYNECOLOGY

## 2022-02-08 PROCEDURE — 0502F SUBSEQUENT PRENATAL CARE: CPT | Performed by: OBSTETRICS & GYNECOLOGY

## 2022-02-08 PROCEDURE — 76816 OB US FOLLOW-UP PER FETUS: CPT

## 2022-02-08 PROCEDURE — 76816 OB US FOLLOW-UP PER FETUS: CPT | Performed by: OBSTETRICS & GYNECOLOGY

## 2022-02-08 PROCEDURE — 93325 DOPPLER ECHO COLOR FLOW MAPG: CPT

## 2022-02-08 PROCEDURE — 93325 DOPPLER ECHO COLOR FLOW MAPG: CPT | Performed by: OBSTETRICS & GYNECOLOGY

## 2022-02-08 NOTE — PROGRESS NOTES
Documentation of the ultasound findings, images, and interpretations will be available in the patient's Viewpoint report located in the Chart Review Imaging tab in [employer].

## 2022-02-08 NOTE — PROGRESS NOTES
OB FOLLOW UP  CC- Here for care of pregnancy        Bianca Mercado is a 36 y.o.  25w6d patient being seen today for her obstetrical follow up visit. Patient reports headache, heartburn and nausea.     Her prenatal care is complicated by (and status) :    Patient Active Problem List   Diagnosis   • Pregnancy resulting from assisted reproductive technology in second trimester   • Acquired hypothyroidism   • Simple goiter   • Rh negative, antepartum   • Hx of preeclampsia, prior pregnancy, currently pregnant   • Anxiety   • Depression   • Multigravida of advanced maternal age in second trimester   • Previous  section   • Chronic hypertension affecting pregnancy       Flu Status: Desires at future appt  Ultrasound Today: Yes at [facility]    ROS -   Patient Reports : Headaches, Nausea and heartburn  Patient Denies: Loss of Fluid, Vaginal Spotting, Vision Changes, Vomiting , Contractions and Epigastric pain  Fetal Movement : normal  All other systems reviewed and are negative.       The additional following portions of the patient's history were reviewed and updated as appropriate: allergies, current medications, past family history, past medical history, past social history, past surgical history and problem list.    I have reviewed and agree with the HPI, ROS, and historical information as entered above. Monique Dasilva MD    /78   LMP  (LMP Unknown)       EXAM:     FHT: + BPM   Uterine Size: size equals dates  Pelvic Exam: No    Urine glucose/protein: See prenatal flowsheet       Assessment and Plan    Problem List Items Addressed This Visit     Rh negative, antepartum    Overview     Rhogam given 21         Hx of preeclampsia, prior pregnancy, currently pregnant    Overview     Baby asa  Baseline 24h urine         Anxiety    Overview     Mood disorder  On lamictal, trileptal at nob. Followed by psych- stable         Multigravida of advanced maternal age in second trimester    Overview     cfDNA  low risk  AFP         Previous  section    Overview     32 wks for severe preeclampsia  LTUI         Chronic hypertension affecting pregnancy    Overview     labetelol 200 BID started 20 wks.          Relevant Medications    labetalol (NORMODYNE) 200 MG tablet    RESOLVED: [place] product of in vitro fertilization (IVF) pregnancy      Other Visit Diagnoses     25 weeks gestation of pregnancy    -  Primary    Relevant Orders    POC Protein, Urine, Qualitative, Dipstick (Completed)    POC Glucose, Urine, Qualitative, Dipstick (Completed)          1. Pregnancy at 25w6d. PD[facility] fetal echo today wnl.   2. BP stable.   3. Has FU with PDC 4 wks for growth.   4. Fetal status reassuring.   5. Activity and Exercise discussed.  Return in about 3 weeks (around 3/1/2022) for F/U Prenatal, and glucola.    Monique Dasilva MD  2022

## 2022-02-22 ENCOUNTER — ROUTINE PRENATAL (OUTPATIENT)
Dept: OBSTETRICS AND GYNECOLOGY | Facility: CLINIC | Age: 37
End: 2022-02-22

## 2022-02-22 VITALS — WEIGHT: 206.6 LBS | BODY MASS INDEX: 33.35 KG/M2 | DIASTOLIC BLOOD PRESSURE: 82 MMHG | SYSTOLIC BLOOD PRESSURE: 144 MMHG

## 2022-02-22 DIAGNOSIS — O09.812 PREGNANCY RESULTING FROM ASSISTED REPRODUCTIVE TECHNOLOGY IN SECOND TRIMESTER: ICD-10-CM

## 2022-02-22 DIAGNOSIS — O09.522 MULTIGRAVIDA OF ADVANCED MATERNAL AGE IN SECOND TRIMESTER: ICD-10-CM

## 2022-02-22 DIAGNOSIS — O09.299 HX OF PREECLAMPSIA, PRIOR PREGNANCY, CURRENTLY PREGNANT: ICD-10-CM

## 2022-02-22 DIAGNOSIS — Z3A.27 27 WEEKS GESTATION OF PREGNANCY: Primary | ICD-10-CM

## 2022-02-22 DIAGNOSIS — O26.899 RH NEGATIVE, ANTEPARTUM: ICD-10-CM

## 2022-02-22 DIAGNOSIS — O10.919 CHRONIC HYPERTENSION AFFECTING PREGNANCY: ICD-10-CM

## 2022-02-22 DIAGNOSIS — Z98.891 PREVIOUS CESAREAN SECTION: ICD-10-CM

## 2022-02-22 DIAGNOSIS — F41.9 ANXIETY: ICD-10-CM

## 2022-02-22 DIAGNOSIS — Z67.91 RH NEGATIVE, ANTEPARTUM: ICD-10-CM

## 2022-02-22 LAB
GLUCOSE UR STRIP-MCNC: NEGATIVE MG/DL
PROT UR STRIP-MCNC: NEGATIVE MG/DL

## 2022-02-22 PROCEDURE — 90715 TDAP VACCINE 7 YRS/> IM: CPT | Performed by: OBSTETRICS & GYNECOLOGY

## 2022-02-22 PROCEDURE — 99214 OFFICE O/P EST MOD 30 MIN: CPT | Performed by: OBSTETRICS & GYNECOLOGY

## 2022-02-22 PROCEDURE — 90471 IMMUNIZATION ADMIN: CPT | Performed by: OBSTETRICS & GYNECOLOGY

## 2022-02-22 PROCEDURE — 96372 THER/PROPH/DIAG INJ SC/IM: CPT | Performed by: OBSTETRICS & GYNECOLOGY

## 2022-02-22 RX ORDER — LABETALOL 200 MG/1
200 TABLET, FILM COATED ORAL 3 TIMES DAILY
Qty: 90 TABLET | Refills: 6 | Status: SHIPPED | OUTPATIENT
Start: 2022-02-22 | End: 2022-03-11 | Stop reason: HOSPADM

## 2022-02-22 NOTE — PROGRESS NOTES
OB FOLLOW UP  CC- Here for care of pregnancy         Bianca Mercado is a 36 y.o.  27w6d patient being seen today for her obstetrical follow up. Patient reports headaches (without vision changes), swelling in hands, feet, and face. She isn't sure if her blood pressure medication needs to be adjusted due to elevated at home b/p readings 130's/80's.      Patient undergoing Glucola testing today. She is due for her testing at: 2:25     MBT: O-  Rhogam Given: Yes  TDAP: given today  Breast Pump: prescription given  Flu Status: Declines  Ultrasound Today: No.    Her prenatal care is complicated by (and status) :    Patient Active Problem List   Diagnosis   • Pregnancy resulting from assisted reproductive technology in second trimester   • Acquired hypothyroidism   • Simple goiter   • Rh negative, antepartum   • Hx of preeclampsia, prior pregnancy, currently pregnant   • Anxiety   • Depression   • Multigravida of advanced maternal age in second trimester   • Previous  section   • Chronic hypertension affecting pregnancy         ROS -   Patient Reports : Swelling and headaches  Patient Denies: Loss of Fluid, Vaginal Spotting, Vision Changes, Nausea  and Vomiting   Fetal Movement : normal    The additional following portions of the patient's history were reviewed and updated as appropriate: allergies, current medications, past family history, past medical history, past social history, past surgical history and problem list.    I have reviewed and agree with the HPI, ROS, and historical information as entered above. Monique Dasilva MD    /82   Wt 93.7 kg (206 lb 9.6 oz)   LMP  (LMP Unknown)   BMI 33.35 kg/m²         EXAM:     FHT: + BPM   Uterine Size: size equals dates  Pelvic Exam: No       Assessment and Plan    Problem List Items Addressed This Visit     Pregnancy resulting from assisted reproductive technology in second trimester    Overview     Fetal echo wnl         Rh negative, antepartum     Overview     Rhogam given 21         Hx of preeclampsia, prior pregnancy, currently pregnant    Overview     Baby asa  Baseline 24h urine         Anxiety    Overview     Mood disorder  On lamictal, trileptal at nob. Followed by psych- stable         Multigravida of advanced maternal age in second trimester    Overview     cfDNA low risk  AFP         Previous  section    Overview     32 wks for severe preeclampsia  LTUI         Chronic hypertension affecting pregnancy    Overview     labetelol 200 BID started 20 wks. TID 28 wks         Relevant Medications    labetalol (NORMODYNE) 200 MG tablet      Other Visit Diagnoses     27 weeks gestation of pregnancy    -  Primary    Relevant Orders    POC Urinalysis Dipstick (Completed)    Antibody Screen    CBC (No Diff)    Gestational Screen 1 Hr ([facility])          1. Pregnancy at 27w6d. Will increase her labetelol to 200 TID. She has FU with PDC in 2 wks.   2. glucola today, rhogam today  3. Fetal status reassuring.   4. Activity and Exercise discussed.  Return in about 1 month (around 3/22/2022) for F/U Prenatal.    Monique Dasilva MD  2022

## 2022-02-24 LAB
BLD GP AB SCN SERPL QL: NEGATIVE
ERYTHROCYTE [DISTWIDTH] IN BLOOD BY AUTOMATED COUNT: 13 % (ref 11.7–15.4)
GLUCOSE 1H P 50 G GLC PO SERPL-MCNC: 99 MG/DL (ref 65–139)
HCT VFR BLD AUTO: 33.5 % (ref 34–46.6)
HGB BLD-MCNC: 11.3 G/DL (ref 11.1–15.9)
MCH RBC QN AUTO: 29.7 PG (ref 26.6–33)
MCHC RBC AUTO-ENTMCNC: 33.7 G/DL (ref 31.5–35.7)
MCV RBC AUTO: 88 FL (ref 79–97)
PLATELET # BLD AUTO: 282 X10E3/UL (ref 150–450)
RBC # BLD AUTO: 3.81 X10E6/UL (ref 3.77–5.28)
WBC # BLD AUTO: 10.4 X10E3/UL (ref 3.4–10.8)

## 2022-03-04 ENCOUNTER — TELEPHONE (OUTPATIENT)
Dept: OBSTETRICS AND GYNECOLOGY | Facility: CLINIC | Age: 37
End: 2022-03-04

## 2022-03-04 ENCOUNTER — ROUTINE PRENATAL (OUTPATIENT)
Dept: OBSTETRICS AND GYNECOLOGY | Facility: CLINIC | Age: 37
End: 2022-03-04

## 2022-03-04 VITALS — SYSTOLIC BLOOD PRESSURE: 142 MMHG | WEIGHT: 204.6 LBS | BODY MASS INDEX: 33.02 KG/M2 | DIASTOLIC BLOOD PRESSURE: 84 MMHG

## 2022-03-04 DIAGNOSIS — Z3A.29 29 WEEKS GESTATION OF PREGNANCY: Primary | ICD-10-CM

## 2022-03-04 LAB
CLARITY, POC: CLEAR
COLOR UR: YELLOW
GLUCOSE UR STRIP-MCNC: NEGATIVE MG/DL
PROT UR STRIP-MCNC: NEGATIVE MG/DL

## 2022-03-04 PROCEDURE — 59025 FETAL NON-STRESS TEST: CPT | Performed by: NURSE PRACTITIONER

## 2022-03-04 PROCEDURE — 0502F SUBSEQUENT PRENATAL CARE: CPT | Performed by: NURSE PRACTITIONER

## 2022-03-04 NOTE — PROGRESS NOTES
"OB FOLLOW UP  CC- Here for care of pregnancy        Bianca Mercado is a 36 y.o.  29w2d patient being seen today for her obstetrical follow up visit. Patient reports no complaints. Generally feeling \"blah\" but no specific complaints.      Her prenatal care is complicated by (and status) :    Patient Active Problem List   Diagnosis   • Pregnancy resulting from assisted reproductive technology in second trimester   • Acquired hypothyroidism   • Simple goiter   • Rh negative, antepartum   • Hx of preeclampsia, prior pregnancy, currently pregnant   • Anxiety   • Depression   • Multigravida of advanced maternal age in second trimester   • Previous  section   • Chronic hypertension affecting pregnancy       Flu Status: Declines  Ultrasound Today: No.    ROS -   Patient Reports : No Problems  Patient Denies: Loss of Fluid, Vaginal Spotting, Vision Changes, Headaches, Nausea , Vomiting , Contractions and Epigastric pain  Fetal Movement : normal  All other systems reviewed and are negative.       The additional following portions of the patient's history were reviewed and updated as appropriate: allergies and current medications.    I have reviewed and agree with the HPI, ROS, and historical information as entered above. Yesy Card, APRN    /84   Wt 92.8 kg (204 lb 9.6 oz)   LMP  (LMP Unknown)   BMI 33.02 kg/m²       EXAM:     FHT: pos BPM     Pelvic Exam: No    Urine glucose/protein: See prenatal flowsheet       Assessment and Plan    Problem List Items Addressed This Visit     None      Visit Diagnoses     29 weeks gestation of pregnancy    -  Primary    Relevant Orders    POC Urinalysis Dipstick (Completed)          1. Pregnancy at 29w2d  2. Fetal status reassuring.   3. Activity and Exercise discussed.  Return for PDC Tues and here the following week.   Non Stress Test: minutes >20  non-stress test: NST: Reactive  indication: Hypertension  category: Category I  Preeclampsia precautions " and kick counts reviewed    Yesy Card, APRN  03/04/2022

## 2022-03-04 NOTE — TELEPHONE ENCOUNTER
Pt. Is 29wks with a hx of preeclampsia and delivery at 32 wks with previous. Her labetalol was increased 2/22 to 200mg tid. Over the last 2 days her BP has been averaging 140s/90s whereas the week before the increase it had been averaging 130s/80s. She denies any common sx of preeclampsia including HA's and vision changes. She does report some mild nausea and overall not feeling great. Will discuss with KS and call back with recommendations. She has an appt with PD[facility] on Tuesday.

## 2022-03-08 ENCOUNTER — HOSPITAL ENCOUNTER (OUTPATIENT)
Dept: WOMENS IMAGING | Facility: HOSPITAL | Age: 37
Discharge: HOME | End: 2022-03-08
Admitting: OBSTETRICS & GYNECOLOGY

## 2022-03-08 ENCOUNTER — HOSPITAL ENCOUNTER (INPATIENT)
Facility: HOSPITAL | Age: 37
LOS: 3 days | Discharge: HOME | End: 2022-03-11
Attending: OBSTETRICS & GYNECOLOGY | Admitting: OBSTETRICS & GYNECOLOGY

## 2022-03-08 ENCOUNTER — OFFICE VISIT (OUTPATIENT)
Dept: OBSTETRICS AND GYNECOLOGY | Facility: HOSPITAL | Age: 37
End: 2022-03-08

## 2022-03-08 VITALS — BODY MASS INDEX: 33.41 KG/M2 | DIASTOLIC BLOOD PRESSURE: 78 MMHG | SYSTOLIC BLOOD PRESSURE: 181 MMHG | WEIGHT: 207 LBS

## 2022-03-08 DIAGNOSIS — Z98.891 PREVIOUS CESAREAN SECTION: ICD-10-CM

## 2022-03-08 DIAGNOSIS — O10.919 CHRONIC HYPERTENSION AFFECTING PREGNANCY: ICD-10-CM

## 2022-03-08 DIAGNOSIS — O09.299 HX OF PREECLAMPSIA, PRIOR PREGNANCY, CURRENTLY PREGNANT: ICD-10-CM

## 2022-03-08 DIAGNOSIS — O09.812 PREGNANCY RESULTING FROM ASSISTED REPRODUCTIVE TECHNOLOGY IN SECOND TRIMESTER: ICD-10-CM

## 2022-03-08 DIAGNOSIS — R03.0 ELEVATED BLOOD PRESSURE READING: Primary | ICD-10-CM

## 2022-03-08 PROBLEM — O16.9 HTN COMPLICATING PERIPREGNANCY, ANTEPARTUM: Status: ACTIVE | Noted: 2022-03-08

## 2022-03-08 LAB
ABO GROUP BLD: NORMAL
ALP SERPL-CCNC: 140 U/L (ref 39–117)
ALT SERPL W P-5'-P-CCNC: 11 U/L (ref 1–33)
ANTI-D, PASSIVE: NORMAL
AST SERPL-CCNC: 19 U/L (ref 1–32)
BILIRUB BLD-MCNC: NEGATIVE MG/DL
BILIRUB SERPL-MCNC: <0.2 MG/DL (ref 0–1.2)
BLD GP AB SCN SERPL QL: POSITIVE
CLARITY, POC: CLEAR
COLOR UR: YELLOW
CREAT SERPL-MCNC: 0.57 MG/DL (ref 0.57–1)
CREAT SERPL-MCNC: 0.57 MG/DL (ref 0.57–1)
DEPRECATED RDW RBC AUTO: 43.4 FL (ref 37–54)
EGFRCR SERPLBLD CKD-EPI 2021: 120.2 ML/MIN/1.73
ERYTHROCYTE [DISTWIDTH] IN BLOOD BY AUTOMATED COUNT: 13.2 % (ref 12.3–15.4)
GLUCOSE UR STRIP-MCNC: NEGATIVE MG/DL
HCT VFR BLD AUTO: 34.7 % (ref 34–46.6)
HGB BLD-MCNC: 11.5 G/DL (ref 12–15.9)
KETONES UR QL: NEGATIVE
LDH SERPL-CCNC: 211 U/L (ref 135–214)
LEUKOCYTE EST, POC: NEGATIVE
MCH RBC QN AUTO: 29.6 PG (ref 26.6–33)
MCHC RBC AUTO-ENTMCNC: 33.1 G/DL (ref 31.5–35.7)
MCV RBC AUTO: 89.4 FL (ref 79–97)
NITRITE UR-MCNC: NEGATIVE MG/ML
PH UR: 6 [PH] (ref 5–8)
PLATELET # BLD AUTO: 248 10*3/MM3 (ref 140–450)
PMV BLD AUTO: 11.6 FL (ref 6–12)
PROT UR STRIP-MCNC: ABNORMAL MG/DL
RBC # BLD AUTO: 3.88 10*6/MM3 (ref 3.77–5.28)
RBC # UR STRIP: ABNORMAL /UL
RH BLD: NEGATIVE
SARS-COV-2 RDRP RESP QL NAA+PROBE: NORMAL
SP GR UR: 1 (ref 1–1.03)
T&S EXPIRATION DATE: NORMAL
URATE SERPL-MCNC: 4.9 MG/DL (ref 2.4–5.7)
UROBILINOGEN UR QL: NORMAL
WBC NRBC COR # BLD: 11.14 10*3/MM3 (ref 3.4–10.8)

## 2022-03-08 PROCEDURE — 25010000002 BETAMETHASONE ACET & SOD PHOS PER 4 MG: Performed by: OBSTETRICS & GYNECOLOGY

## 2022-03-08 PROCEDURE — 83615 LACTATE (LD) (LDH) ENZYME: CPT | Performed by: OBSTETRICS & GYNECOLOGY

## 2022-03-08 PROCEDURE — 76819 FETAL BIOPHYS PROFIL W/O NST: CPT | Performed by: OBSTETRICS & GYNECOLOGY

## 2022-03-08 PROCEDURE — 86850 RBC ANTIBODY SCREEN: CPT | Performed by: OBSTETRICS & GYNECOLOGY

## 2022-03-08 PROCEDURE — 82565 ASSAY OF CREATININE: CPT | Performed by: OBSTETRICS & GYNECOLOGY

## 2022-03-08 PROCEDURE — 76820 UMBILICAL ARTERY ECHO: CPT

## 2022-03-08 PROCEDURE — 84075 ASSAY ALKALINE PHOSPHATASE: CPT | Performed by: OBSTETRICS & GYNECOLOGY

## 2022-03-08 PROCEDURE — 85027 COMPLETE CBC AUTOMATED: CPT | Performed by: OBSTETRICS & GYNECOLOGY

## 2022-03-08 PROCEDURE — 82247 BILIRUBIN TOTAL: CPT | Performed by: OBSTETRICS & GYNECOLOGY

## 2022-03-08 PROCEDURE — 84460 ALANINE AMINO (ALT) (SGPT): CPT | Performed by: OBSTETRICS & GYNECOLOGY

## 2022-03-08 PROCEDURE — 99221 1ST HOSP IP/OBS SF/LOW 40: CPT | Performed by: OBSTETRICS & GYNECOLOGY

## 2022-03-08 PROCEDURE — 86900 BLOOD TYPING SEROLOGIC ABO: CPT | Performed by: OBSTETRICS & GYNECOLOGY

## 2022-03-08 PROCEDURE — 76816 OB US FOLLOW-UP PER FETUS: CPT | Performed by: OBSTETRICS & GYNECOLOGY

## 2022-03-08 PROCEDURE — 76816 OB US FOLLOW-UP PER FETUS: CPT

## 2022-03-08 PROCEDURE — 76819 FETAL BIOPHYS PROFIL W/O NST: CPT

## 2022-03-08 PROCEDURE — 59025 FETAL NON-STRESS TEST: CPT

## 2022-03-08 PROCEDURE — 84450 TRANSFERASE (AST) (SGOT): CPT | Performed by: OBSTETRICS & GYNECOLOGY

## 2022-03-08 PROCEDURE — 86870 RBC ANTIBODY IDENTIFICATION: CPT | Performed by: OBSTETRICS & GYNECOLOGY

## 2022-03-08 PROCEDURE — 86901 BLOOD TYPING SEROLOGIC RH(D): CPT | Performed by: OBSTETRICS & GYNECOLOGY

## 2022-03-08 PROCEDURE — 76820 UMBILICAL ARTERY ECHO: CPT | Performed by: OBSTETRICS & GYNECOLOGY

## 2022-03-08 PROCEDURE — 99215 OFFICE O/P EST HI 40 MIN: CPT | Performed by: OBSTETRICS & GYNECOLOGY

## 2022-03-08 PROCEDURE — 84550 ASSAY OF BLOOD/URIC ACID: CPT | Performed by: OBSTETRICS & GYNECOLOGY

## 2022-03-08 PROCEDURE — 87635 SARS-COV-2 COVID-19 AMP PRB: CPT | Performed by: OBSTETRICS & GYNECOLOGY

## 2022-03-08 RX ORDER — SODIUM CHLORIDE 0.9 % (FLUSH) 0.9 %
10 SYRINGE (ML) INJECTION EVERY 12 HOURS SCHEDULED
Status: DISCONTINUED | OUTPATIENT
Start: 2022-03-08 | End: 2022-03-11 | Stop reason: HOSPADM

## 2022-03-08 RX ORDER — ASPIRIN 81 MG/1
81 TABLET ORAL DAILY
Status: DISCONTINUED | OUTPATIENT
Start: 2022-03-08 | End: 2022-03-11 | Stop reason: HOSPADM

## 2022-03-08 RX ORDER — BETAMETHASONE SODIUM PHOSPHATE AND BETAMETHASONE ACETATE 3; 3 MG/ML; MG/ML
12 INJECTION, SUSPENSION INTRA-ARTICULAR; INTRALESIONAL; INTRAMUSCULAR; SOFT TISSUE EVERY 24 HOURS
Status: COMPLETED | OUTPATIENT
Start: 2022-03-08 | End: 2022-03-09

## 2022-03-08 RX ORDER — LIDOCAINE HYDROCHLORIDE 10 MG/ML
5 INJECTION, SOLUTION EPIDURAL; INFILTRATION; INTRACAUDAL; PERINEURAL AS NEEDED
Status: DISCONTINUED | OUTPATIENT
Start: 2022-03-08 | End: 2022-03-11 | Stop reason: HOSPADM

## 2022-03-08 RX ORDER — LEVOTHYROXINE SODIUM 0.07 MG/1
75 TABLET ORAL DAILY
Status: DISCONTINUED | OUTPATIENT
Start: 2022-03-08 | End: 2022-03-11 | Stop reason: HOSPADM

## 2022-03-08 RX ORDER — LABETALOL 200 MG/1
200 TABLET, FILM COATED ORAL 3 TIMES DAILY
Status: DISCONTINUED | OUTPATIENT
Start: 2022-03-08 | End: 2022-03-09

## 2022-03-08 RX ORDER — SODIUM CHLORIDE 0.9 % (FLUSH) 0.9 %
1-10 SYRINGE (ML) INJECTION AS NEEDED
Status: DISCONTINUED | OUTPATIENT
Start: 2022-03-08 | End: 2022-03-11 | Stop reason: HOSPADM

## 2022-03-08 RX ORDER — OXCARBAZEPINE 300 MG/1
300 TABLET, FILM COATED ORAL NIGHTLY
Status: DISCONTINUED | OUTPATIENT
Start: 2022-03-08 | End: 2022-03-11 | Stop reason: HOSPADM

## 2022-03-08 RX ORDER — CLONAZEPAM 0.5 MG/1
0.5 TABLET ORAL 2 TIMES DAILY PRN
Status: DISCONTINUED | OUTPATIENT
Start: 2022-03-08 | End: 2022-03-11 | Stop reason: HOSPADM

## 2022-03-08 RX ADMIN — BETAMETHASONE ACETATE AND BETAMETHASONE SODIUM PHOSPHATE 12 MG: 3; 3 INJECTION, SUSPENSION INTRA-ARTICULAR; INTRALESIONAL; INTRAMUSCULAR; SOFT TISSUE at 17:53

## 2022-03-08 RX ADMIN — LABETALOL HCL 200 MG: 200 TABLET, FILM COATED ORAL at 20:01

## 2022-03-08 RX ADMIN — OXCARBAZEPINE 300 MG: 300 TABLET, FILM COATED ORAL at 20:01

## 2022-03-08 RX ADMIN — Medication 10 ML: at 20:32

## 2022-03-08 NOTE — H&P
[facility]  Obstetric History and Physical    Referring Provider: Monique Dasilva MD      CC: Elevated blood pressure.    Subjective     Patient is a 36 y.o. female  currently at 29w6d, who presents from [facility] with elevated blood pressure.  Patient seen today for routine surveillance ultrasound.  Patient reports having.  Blood pressures at home 140s or greater systolic.  She denies headache, visual changes, right upper quadrant pain, nausea, vomiting, leaking fluid, vaginal bleeding.  Patient reports normal fetal activity.  Prenatal care by Dr. Regan.   course complicated by AMA, IVF gestation, chronic hypertension, prior preeclampsia requiring delivery at 32 weeks gestation by .  Patient states labetalol 200 to milligrams was increased to 3 times a day 2 weeks ago.    Her prenatal care is complicated by  hypertension  chronic hypertension, prior   desires repeat  and abnormal fetal growth  IUGR.  Her previous obstetric/gynecological history is remarkable for .    The following portions of the patients history were reviewed and updated as appropriate: current medications, allergies, past medical history, past surgical history, past family history, past social history and problem list .       Prenatal Information:   Maternal Prenatal Labs  Blood Type No results found for: ABO   Rh Status No results found for: RH   Antibody Screen No results found for: ABSCRN   Gonnorhea No results found for: GCCX   Chlamydia No results found for: CLAMYDCU   RPR No results found for: RPR   Syphilis Antibody No results found for: SYPHILIS   Rubella No results found for: RUBELLAIGGIN   Hepatitis B Surface Antigen No results found for: HEPBSAG   HIV-1 Antibody No results found for: LABHIV1   Hepatitis C Antibody No results found for: HEPCAB   Rapid Urin Drug Screen No results found for: AMPMETHU, BARBITSCNUR, LABBENZSCN, LABMETHSCN, LABOPIASCN, THCURSCR, COCAINEUR, AMPHETSCREEN, PROPOXSCN,  BUPRENORSCNU, METAMPSCNUR, OXYCODONESCN, TRICYCLICSCN   Group B Strep Culture No results found for: GBSANTIGEN           External Prenatal Results     Pregnancy Outside Results - Transcribed From Office Records - See Scanned Records For Details     Test Value Date Time    ABO  O  11/02/21 1408    Rh  Negative  11/02/21 1408    Antibody Screen  Negative  02/22/22 1442       Negative  11/02/21 1408       See Final Results  11/02/21 1408    Varicella IgG  1219 index 12/05/16 1515    Rubella  1.99 index 11/02/21 1408    Hgb  11.3 g/dL 02/22/22 1442       11.9 g/dL 01/04/22        12.6 g/dL 11/02/21 1408    Hct  33.5 % 02/22/22 1442       34.8 % 01/04/22        38.1 % 11/02/21 1408    Glucose Fasting GTT       Glucose Tolerance Test 1 hour       Glucose Tolerance Test 3 hour  102 mg/dL 09/19/19 1037    Gonorrhea (discrete)  Negative  11/02/21 1408    Chlamydia (discrete)  Negative  11/02/21 1408    RPR  Non Reactive  11/02/21 1408    VDRL       Syphilis Antibody       HBsAg  Negative  11/02/21 1408    Herpes Simplex Virus PCR       Herpes Simplex VIrus Culture       HIV  Non Reactive  11/02/21 1408    Hep C RNA Quant PCR       Hep C Antibody  <0.1 s/co ratio 11/02/21 1408    AFP  20.1 ng/mL 11/30/21 1425    Group B Strep       GBS Susceptibility to Clindamycin       GBS Susceptibility to Erythromycin       Fetal Fibronectin       Genetic Testing, Maternal Blood             Drug Screening     Test Value Date Time    Urine Drug Screen       Amphetamine Screen  Negative ng/mL 11/02/21 1408    Barbiturate Screen  Negative ng/mL 11/02/21 1408    Benzodiazepine Screen  Negative ng/mL 11/02/21 1408    Methadone Screen  Negative ng/mL 11/02/21 1408    Phencyclidine Screen  Negative ng/mL 11/02/21 1408    Opiates Screen  Negative  09/09/19 1805    THC Screen  Negative  09/09/19 1805    Cocaine Screen       Propoxyphene Screen  Negative ng/mL 11/02/21 1408    Buprenorphine Screen  Negative  09/09/19 1805    Methamphetamine Screen        Oxycodone Screen  Negative  19    Tricyclic Antidepressants Screen  Negative  19          Legend    ^: Historical                          Past OB History:       OB History    Para Term  AB Living   5 1 0 1 3 1   SAB IAB Ectopic Molar Multiple Live Births   1 0 1 0 0 1      # Outcome Date GA Lbr [place]/2nd Weight Sex Delivery Anes PTL Lv   5 Current            4 AB 20 7w0d    SAB         Birth Comments: IUI with donor sperm; treated with Cytotec   3  10/07/19 32w1d  1560 g (3 lb 7 oz) F CS-LTranv Spinal N REYES      Name: CATRINA POWER      Apgar1: 7  Apgar5: 8   2 Ectopic 2018 8w0d   U          Birth Comments: IUI with donor sperm; treated with Methotrexate   1 SAB 2017 6w0d   U          Birth Comments: IUI with donor sperm; treated with Cytotec      Obstetric Comments   FOB #1 : Pregnancy #1   FOB #2 : Pregnancy #2   FOB #3: Pregnancy #3 (IUI with donor sperm, delivered for preeclampsia); #4; #5 (IVF, fresh cycle with donor sperm)       Past Medical History: Past Medical History:   Diagnosis Date   • Anxiety    • Bipolar disorder (HCC)    • Depression    • History of pneumonia    • History of pre-eclampsia     took BP med for 6 months after delivery   • Hypothyroid    • Non-toxic multinodular goiter       Past Surgical History Past Surgical History:   Procedure Laterality Date   •  SECTION N/A 10/7/2019    Procedure:  SECTION PRIMARY;  Surgeon: Monique Dasilva MD;  Location: [facility] LABOR DELIVERY;  Service: Obstetrics/Gynecology   • CHOLECYSTECTOMY     • KNEE SURGERY Right     knee arthroscopy   • NASAL SEPTUM SURGERY     • NOSE SURGERY      fractured   • SHOULDER SURGERY Left       Family History: Family History   Problem Relation Age of Onset   • Cancer Mother    • Colon cancer Father    • Heart failure Paternal Grandmother    • No Known Problems Maternal Grandmother    • Lung cancer Maternal Grandfather    • Heart  disease Maternal Grandfather       Social History:  reports that she has never smoked. She has never used smokeless tobacco.   reports previous alcohol use.   reports no history of drug use.                   General ROS Negative Findings:Headaches, Visual Changes, Epigastric pain, Anorexia, Nausia/Vomiting, ROM and Vaginal Bleeding    ROS     All other systems have been reviewed and are neg  Objective       Vital Signs Range for the last 24 hours  Temperature:     Temp Source:     BP: BP: (148-181)/(78-84) 181/78   Pulse:     Respirations:     SPO2:     O2 Amount (l/min):     O2 Devices     Weight: Weight:  [93.9 kg (207 lb)] 93.9 kg (207 lb)     Physical Examination:   General:   alert, appears stated age and cooperative   Skin:   normal   HEENT:     Lungs:   clear to auscultation bilaterally   Heart:   regular rate and rhythm, S1, S2 normal, no murmur, click, rub or gallop   Gastrointestinal:  Abdomen soft, gravid uterus, nontender, guarding benign exam   Lower Extremities:  Trace of edema, no calf tenderness   : Exam deferred.   Musculoskeletal:     Neuro:  No focal deficits, DTR 2+4 no clonus         Presentation:  Vertex   Cervix: Exam by:     Dilation:     Effacement:     Station:         Fetal Heart Rate Assessment   Method:     Beats/min:     Baseline:     Varibility:     Accels:     Decels:     Tracing Category:       Uterine Assessment   Method:     Frequency (min):     Ctx Count in 10 min:     Duration:     Intensity:     Intensity by IUPC:     Resting Tone:     Resting Tone by IUPC:     [place] Units:       Laboratory Results:   Lab Results (last 24 hours)     ** No results found for the last 24 hours. **        Radiology Review:   Imaging Results (Last 24 Hours)     ** No results found for the last 24 hours. **        Other Studies:    Assessment/Plan       Acquired hypothyroidism    Hx of preeclampsia, prior pregnancy, currently pregnant    Multigravida of advanced maternal age in second  trimester    Previous  section    Chronic hypertension affecting pregnancy    HTN complicating peripregnancy, antepartum        Assessment:  1.  Intrauterine pregnancy at 29w6d weeks gestation with reactive fetal status.    2.  Chronic hypertension evaluate for superimposed preeclampsia  3.  History of  delivery at 32 weeks gestation due to preeclampsia  4.  AC at the 6 percentile with elevated umbilical artery Dopplers (4.54)  5.   x1  6.  AMA  7.  Hypothyroidism    Plan:  1.  Admit, IV access, labs, 24 of urine protein and creatinine clearance, steroids for fetal benefit, home medications, will hold off on magnesium sulfate unless severe range blood pressure persist.  2. Plan of care has been reviewed with patient.  3.  Risks, benefits of treatment plan have been discussed.  4.  All questions have been answered.  5      Marty Mora,   3/8/2022  17:05 EST

## 2022-03-09 LAB
COLLECT DURATION TIME UR: 24 HRS
PROT 24H UR-MRATE: 520.2 MG/24HOURS (ref 0–150)
SPECIMEN VOL 24H UR: 1700 ML

## 2022-03-09 PROCEDURE — 99232 SBSQ HOSP IP/OBS MODERATE 35: CPT | Performed by: OBSTETRICS & GYNECOLOGY

## 2022-03-09 PROCEDURE — 82575 CREATININE CLEARANCE TEST: CPT | Performed by: OBSTETRICS & GYNECOLOGY

## 2022-03-09 PROCEDURE — 25010000002 BETAMETHASONE ACET & SOD PHOS PER 4 MG: Performed by: OBSTETRICS & GYNECOLOGY

## 2022-03-09 PROCEDURE — 59025 FETAL NON-STRESS TEST: CPT

## 2022-03-09 PROCEDURE — 84156 ASSAY OF PROTEIN URINE: CPT | Performed by: OBSTETRICS & GYNECOLOGY

## 2022-03-09 RX ORDER — LABETALOL 300 MG/1
300 TABLET, FILM COATED ORAL 3 TIMES DAILY
Status: DISCONTINUED | OUTPATIENT
Start: 2022-03-09 | End: 2022-03-11 | Stop reason: HOSPADM

## 2022-03-09 RX ADMIN — LABETALOL HYDROCHLORIDE 300 MG: 300 TABLET, FILM COATED ORAL at 09:29

## 2022-03-09 RX ADMIN — OXCARBAZEPINE 300 MG: 300 TABLET, FILM COATED ORAL at 20:53

## 2022-03-09 RX ADMIN — LAMOTRIGINE 150 MG: 100 TABLET ORAL at 09:30

## 2022-03-09 RX ADMIN — LEVOTHYROXINE SODIUM 75 MCG: 0.07 TABLET ORAL at 09:30

## 2022-03-09 RX ADMIN — LABETALOL HYDROCHLORIDE 300 MG: 300 TABLET, FILM COATED ORAL at 16:58

## 2022-03-09 RX ADMIN — Medication 10 ML: at 20:53

## 2022-03-09 RX ADMIN — ASPIRIN 81 MG: 81 TABLET, COATED ORAL at 09:30

## 2022-03-09 RX ADMIN — BETAMETHASONE ACETATE AND BETAMETHASONE SODIUM PHOSPHATE 12 MG: 3; 3 INJECTION, SUSPENSION INTRA-ARTICULAR; INTRALESIONAL; INTRAMUSCULAR; SOFT TISSUE at 17:50

## 2022-03-09 NOTE — PLAN OF CARE
Goal Outcome Evaluation:   Expected management of antepartum with 24 urine in progress and  steroid completion

## 2022-03-09 NOTE — PROGRESS NOTES
3/9/2022  HD:1  36 y.o. female  at 30w0d    Subjective   Bianca feels well. Denies headache, visual changes, RUQ pain. Good fm. No contractions.            Objective   Temp: Temp:  [97.7 °F (36.5 °C)-98.2 °F (36.8 °C)] 97.7 °F (36.5 °C) Temp src: Oral   BP: BP: (138-181)/(78-99) 140/80        Pulse: Heart Rate:  [] 94  RR: Resp:  [16-20] 16    General:  nad   Abdomen: Gravid, nontender         Lab Results   Component Value Date    WBC 11.14 (H) 2022    HGB 11.5 (L) 2022    HCT 34.7 2022    MCV 89.4 2022     2022    HEPBSAG Negative 2021     Results from last 7 days   Lab Units 22   AST (SGOT) U/L 19     Results from last 7 days   Lab Units 22   ALT (SGPT) U/L 11      Results from last 7 days   Lab Units 22   CREATININE mg/dL 0.57  0.57      Results from last 7 days   Lab Units 22   BILIRUBIN mg/dL <0.2         Assessment/ plan  1.   36 y.o. yo female  at 30w0d  2. CHTN, that has been controlled on labetelol 200 TID, with a history of preeclampsia requiring delivery at 32 wks with prev pregnancy. On US yesterday baby had AC lag with elevated cord dopplers, and she had a severe range pressure and so was sent in for r/o preeclmapsia. She had a few 160 BPs yesterday after admission. Overnight they have been normal-mild range. Her labs are normal and she has no symptoms. Her 24h urine is pending and her second steroids dose will be this evening. At this point it doesn't seem to be severe disease. Will increase her labetelol to 300 TID and will continue monitoring until tomorrow inpatient. Depending on 24h urine results, as well as her repeated BP monitoring, will make decision about inpatient or outpatient monitoring.   3. IVF, prev c/s, AMA, hypothyroid, mood disorder/anxiety        This note has been electronically signed.    Monique Dasilva MD  2022

## 2022-03-10 LAB
COLLECT DURATION TIME UR: 24 HRS
CREAT CL 24H UR+SERPL-VRATE: 119.5 L/24 HR (ref 126.7–184.3)
CREAT CL 24H UR+SERPL-VRATE: 83 ML/MIN (ref 88–128)
CREAT UR-MCNC: 47 MG/DL
CREATINE 24H UR-MRATE: 0.8 G/24 HR (ref 0.7–1.6)
SPECIMEN VOL 24H UR: 1700 ML

## 2022-03-10 PROCEDURE — 99231 SBSQ HOSP IP/OBS SF/LOW 25: CPT | Performed by: OBSTETRICS & GYNECOLOGY

## 2022-03-10 PROCEDURE — 63710000001 DIPHENHYDRAMINE PER 50 MG: Performed by: OBSTETRICS & GYNECOLOGY

## 2022-03-10 RX ORDER — DIPHENHYDRAMINE HCL 25 MG
25 CAPSULE ORAL EVERY 6 HOURS PRN
Status: DISCONTINUED | OUTPATIENT
Start: 2022-03-10 | End: 2022-03-11 | Stop reason: HOSPADM

## 2022-03-10 RX ADMIN — LABETALOL HYDROCHLORIDE 300 MG: 300 TABLET, FILM COATED ORAL at 01:02

## 2022-03-10 RX ADMIN — DIPHENHYDRAMINE HYDROCHLORIDE 25 MG: 25 CAPSULE ORAL at 08:07

## 2022-03-10 RX ADMIN — DIPHENHYDRAMINE HYDROCHLORIDE 25 MG: 25 CAPSULE ORAL at 21:31

## 2022-03-10 RX ADMIN — LABETALOL HYDROCHLORIDE 300 MG: 300 TABLET, FILM COATED ORAL at 16:40

## 2022-03-10 RX ADMIN — LABETALOL HYDROCHLORIDE 300 MG: 300 TABLET, FILM COATED ORAL at 08:19

## 2022-03-10 RX ADMIN — OXCARBAZEPINE 300 MG: 300 TABLET, FILM COATED ORAL at 21:31

## 2022-03-10 RX ADMIN — LEVOTHYROXINE SODIUM 75 MCG: 0.07 TABLET ORAL at 08:19

## 2022-03-10 RX ADMIN — DIPHENHYDRAMINE HYDROCHLORIDE 25 MG: 25 CAPSULE ORAL at 14:51

## 2022-03-10 RX ADMIN — ASPIRIN 81 MG: 81 TABLET, COATED ORAL at 08:12

## 2022-03-10 RX ADMIN — LAMOTRIGINE 150 MG: 100 TABLET ORAL at 08:11

## 2022-03-10 NOTE — PROGRESS NOTES
"[facility]  Maternal-Fetal Medicine  Progress Note    HD2     Chief Complaint:  Chief Complaint   Patient presents with   • Elevated Blood Pressure       36 year old  at 30 weeks 1 day with CHTN and history of preeclampsia requiring delivery at 32 weeks, admitted with worsening BP profile and fetal growth restriction/elevated UA dopplers. Since admission, has received betamethasone course. 24 hour urine was collected and noted increase to >500mg (from baseline upper 200s).     Overall, Ms Mercado feels well with no headache, RUQ pain, vision changes, nausea, vomiting. She is requesting to go home.       Objective     Vital Signs Range for the last 24 hours  Temp:  [97.6 °F (36.4 °C)-99.4 °F (37.4 °C)] 98 °F (36.7 °C)   Temp src: Axillary   BP: (118-147)/(70-85) 147/82   Heart Rate:  [91-98] 98   Resp:  [16] 16           Device (Oxygen Therapy): room air           Flowsheet Rows    Flowsheet Row First Filed Value   Admission Height 167.6 cm (66\") Documented at 2022 1722   Admission Weight 93.9 kg (207 lb) Documented at 2022 1722          Intake/Output last 24 hours:      Intake/Output Summary (Last 24 hours) at 3/10/2022 0949  Last data filed at 3/9/2022 1100  Gross per 24 hour   Intake --   Output 500 ml   Net -500 ml       Intake/Output this shift:    No intake/output data recorded.    Physical Exam:    Tearful.   Sitting upright in bed   Alert and oriented   Normal pulmonary effort          Ultrasound (3/9/22):     Cephalic   Overall S=D though AC is small (6th%ile)   Normal fluid   BPP 8/8   UA dopplers elevated (99th%ile) but with forward flow           Fetal Heart Rate Assessment           Baseline: Fetal HR Baseline: normal range   Varibility: Fetal HR Variability: moderate (amplitude range 6 to 25 bpm)   Accels: Fetal HR Accelerations: greater than/equal to 15 bpm, lasting at least 15 seconds   Decels: Fetal HR Decelerations: absent   Tracing Category:       Uterine Assessment         "   Ctx Count in 10 min:     Duration:     Intensity: Contraction Intensity: no contractions   Intensity by IUPC:     Resting Tone: Uterine Resting Tone: soft by palpation   Resting Tone by IUPC:     [place] Units:       Medications:  aspirin, 81 mg, Oral, Daily  labetalol, 300 mg, Oral, TID  lamoTRIgine, 150 mg, Oral, Daily  levothyroxine, 75 mcg, Oral, Daily  OXcarbazepine, 300 mg, Oral, Nightly  sodium chloride, 10 mL, Intravenous, Q12H       clonazePAM  •  diphenhydrAMINE  •  lidocaine PF 1%  •  sodium chloride    Labs:    CBC:   Lab Results   Component Value Date     2022    HGB 11.5 (L) 2022    HCT 34.7 2022    WBC 11.14 (H) 2022     Pre-eclampsia Panel:   Lab Results   Component Value Date    ALKPHOS 140 (H) 2022    AST 19 2022    ALT 11 2022    BILITOT <0.2 2022     2022    URICACID 4.9 2022    CREATININE 0.57 2022    CREATININE 0.57 2022     24 hour urine results:   Lab Results   Component Value Date    URINEVOLUME 1,700 2022    URINEVOLUME 1,700 2022    [id number] 520.2 (H) 2022    CRCLEARANCE 83.0 (L) 2022       Assessment/Plan       Acquired hypothyroidism    Hx of preeclampsia, prior pregnancy, currently pregnant    Multigravida of advanced maternal age in second trimester    Previous  section    Chronic hypertension affecting pregnancy    HTN complicating peripregnancy, antepartum        Assessment/Plan:  36 year old  at 30 weeks 1 day with CHTN with superimposed preeclampsia and fetal growth restriction/elevated UA dopplers. Concern for evolving severe features based on blood pressure criteria (multiple severe range blood pressures in PDC) though blood pressures have been mild range only since admission and with increase in Labetalol to 300 TID.   Patient is currently asymptomatic and PIH labs normal.   Fetal testing has been reassuring since admission.   The general  recommendation in this setting is for prolonged admission even through delivery though patient is very concerned about staying at this time due to being away from her daughter.   We discussed that she is at increased risk for complications of preeclampsia that can occur quite rapidly including maternal severe blood pressure, CVA, seizure, abruption, IUFD.   At a minimum, I recommend monitoring for the next day or so to ensure normal repeat labs, no evolving maternal symptoms, well controlled BP, reassuring fetal testing, no worsening UA dopplers (can perform tomorrow AM in PDC).   If discharged, recommend close follow up with weekly UA dopplers in PDC, weekly labs, twice weekly fetal testing, home BP monitoring and very low threshold for admission.         Josefina Cain MD  3/10/2022  09:49 EST

## 2022-03-10 NOTE — PROGRESS NOTES
3/10/2022  HD:2  36 y.o. female  at 30w1d    Subjective   Bianca feels well. No headache, visual chabnges RUQ pain    Her 24h urine returned 520mg protein       Objective   Temp: Temp:  [97.6 °F (36.4 °C)-99.4 °F (37.4 °C)] 98 °F (36.7 °C) Temp src: Axillary   BP: BP: (118-147)/(70-85) 147/82        Pulse: Heart Rate:  [91-98] 98  RR: Resp:  [16] 16    General:  nad   Abdomen: Gravid, nontender         Lab Results   Component Value Date    WBC 11.14 (H) 2022    HGB 11.5 (L) 2022    HCT 34.7 2022    MCV 89.4 2022     2022    HEPBSAG Negative 2021     Results from last 7 days   Lab Units 22   AST (SGOT) U/L 19     Results from last 7 days   Lab Units 22   ALT (SGPT) U/L 11      Results from last 7 days   Lab Units 22   CREATININE mg/dL 0.57  0.57      Results from last 7 days   Lab Units 22   BILIRUBIN mg/dL <0.2         Assessment  1.   36 y.o. yo female  at 30w1d  2. CHTN, with superimposed preeclampsia. Her 24h urine is up from 278 to 520mg. Her bps are stable on labtelol 300 TID, nl-mild range. Labs have been normal. No symptoms. Fetal monitoring reassuring.     Plan  1. Cont current hospital care.   2. Will discuss with PDC plan.    This note has been electronically signed.    Monique Dasilva MD  March 10, 2022

## 2022-03-10 NOTE — PAYOR COMM NOTE
"Bianca Mercado (36 y.o. Female)     [place] Ref#[id number]    Additional clinical for re-consideration for inpatient admission.    From: Bethanie Paez LPN, Utilization Review  Phone #[phone]  Fax #[phone]              Date of Birth   1985    Social Security Number       Address   [address] [place] [address]    Home Phone   [phone]    MRN   [id number]       Religious   Tenriism    Marital Status   Single                            Admission Date   3/8/22    Admission Type   Elective    Admitting Provider   Monique Dasilva MD    Attending Provider   Monique Dasilva MD    Department, Room/Bed   [facility] ANTEPARTUM, N329/1       Discharge Date       Discharge Disposition       Discharge Destination                               Attending Provider: Monique Dasilva MD    Allergies: Sulfa Antibiotics    Isolation: None   Infection: None   Code Status: CPR   Advance Care Planning Activity    Ht: 167.6 cm (66\")   Wt: 93.9 kg (207 lb)    Admission Cmt: None   Principal Problem: None                Active Insurance as of 3/8/2022     Primary Coverage     Payor Plan Insurance Group Employer/Plan Group    [facility] BLUE CROSS [facility] [facility] EMPLOYEE [id number]     Payor Plan Address Payor Plan Phone Number Payor Plan Fax Number Effective Dates    [address] [phone]  1/1/2015 - None Entered    [place] [address]       Subscriber Name Subscriber Birth Date Member ID       BIANCA MERCADO JEAN CARLOS 1985 [id number]                 Emergency Contacts      (Rel.) Home Phone Work Phone Mobile Phone    Fatoumata Mercado (Mother) -- -- [phone]            Insurance Information                [facility] [employer] [facility] [employer] EMPL[employer]EE Phone: [phone]    Subscriber: Bianca Mercado JEAN CARLOS Subscriber#: [id number]    Group#: [id number] Precert#: --          Vital Signs (last 2 days)     Date/Time Temp Temp src Pulse Resp BP Patient Position    03/10/22 " 0809 98 (36.7) Axillary 98 16 147/82 --    03/10/22 0347 97.9 (36.6) Oral 92 16 118/70 Lying    03/09/22 2355 98.3 (36.8) Oral 92 16 141/82 Lying    03/09/22 1935 99.4 (37.4) Axillary 91 16 146/74 Sitting    03/09/22 1622 97.6 (36.4) Oral 93 16 138/80 Sitting    03/09/22 1201 97.8 (36.6) Oral 96 16 133/85 Sitting    03/09/22 0805 98.2 (36.8) Oral 96 18 131/76 --    03/09/22 0425 97.7 (36.5) Oral 94 16 140/80 Lying    03/09/22 0015 97.7 (36.5) Oral 100 16 138/82 Lying    03/08/22 2109 -- -- 83 -- 158/86 --    03/08/22 1959 97.7 (36.5) Oral 88 20 166/83 Lying    03/08/22 1853 -- -- 81 -- 164/94 Sitting    03/08/22 1838 -- -- 92 -- 161/99 Sitting    03/08/22 1823 -- -- 83 -- 162/95 Sitting    03/08/22 1808 -- -- 86 -- 161/91 --    03/08/22 1745 -- -- 80 -- 158/80 --    03/08/22 1720 98.2 (36.8) Axillary 79 20 160/83 Sitting          Facility-Administered Medications as of 3/10/2022   Medication Dose Route Frequency Provider Last Rate Last Admin   • aspirin EC tablet 81 mg  81 mg Oral Daily Marty Mora DO   81 mg at 03/10/22 0812   • [COMPLETED] betamethasone acetate-betamethasone sodium phosphate (CELESTONE SOLUSPAN) injection 12 mg  12 mg Intramuscular Q24H Marty Mora DO   12 mg at 03/09/22 1750   • clonazePAM (KlonoPIN) tablet 0.5 mg  0.5 mg Oral BID PRN Marty Mora DO       • diphenhydrAMINE (BENADRYL) capsule 25 mg  25 mg Oral Q6H PRN Rj Villanueva MD   25 mg at 03/10/22 0807   • labetalol (NORMODYNE) tablet 300 mg  300 mg Oral TID Monique Dasilva MD   300 mg at 03/10/22 0819   • lamoTRIgine (LaMICtal) tablet 150 mg  150 mg Oral Daily Marty Mora DO   150 mg at 03/10/22 0811   • levothyroxine (SYNTHROID, LEVOTHROID) tablet 75 mcg  75 mcg Oral Daily Marty Mora DO   75 mcg at 03/10/22 0819   • lidocaine PF 1% (XYLOCAINE) injection 5 mL  5 mL Intradermal PRN Marty Mora, DO       • OXcarbazepine (TRILEPTAL) tablet 300 mg  300 mg Oral Nightly Marty Mora, DO    300 mg at 03/09/22 2053   • sodium chloride 0.9 % flush 1-10 mL  1-10 mL Intravenous PRN Marty Mora, DO   10 mL at 03/08/22 2032   • sodium chloride 0.9 % flush 10 mL  10 mL Intravenous Q12H Marty Mora, DO   10 mL at 03/09/22 2053     Lab Results (last 48 hours)     Procedure Component Value Units Date/Time    Creatinine Clearance - Urine, Clean Catch [[id number]]  (Abnormal) Collected: 03/08/22 1912    Specimen: 24 Hour Urine from Urine, Clean Catch Updated: 03/10/22 0005    Narrative:      The following orders were created for panel order Creatinine Clearance - Urine, Clean Catch.  Procedure                               Abnormality         Status                     ---------                               -----------         ------                     Creatinine clearance serum[[id number]]   Normal              Final result               Creatinine Clearance, Ur...[[id number]]  Abnormal            Final result                 Please view results for these tests on the individual orders.    Creatinine Clearance, Urine, 24 Hour - Urine, Clean Catch [[id number]]  (Abnormal) Collected: 03/09/22 1633    Specimen: 24 Hour Urine from Urine, Clean Catch Updated: 03/10/22 0005     Creatinine Clearance 83.0 ml/min      Creatinine, Urine 47.0 mg/dL      Time (Hours) 24 hrs      Creatinine, 24H 0.80 g/24 hr      CREATININE CLEARANCE L/24 HOUR 119.5 L/24 hr      24H Urine Volume 1,700 mL     Protein, Urine, 24 Hour - Urine, Clean Catch [[id number]]  (Abnormal) Collected: 03/09/22 1633    Specimen: 24 Hour Urine from Urine, Clean Catch Updated: 03/09/22 1731     Protein, 24H Urine 520.2 mg/24hours      24H Urine Volume 1,700 mL      Time (Hours) 24 hrs     Narrative:      Reference ranges are based on a 24 hour period, interperet results accordingly.    Preeclampsia Panel [[id number]]  (Abnormal) Collected: 03/08/22 1912    Specimen: Blood Updated: 03/08/22 2005     Alkaline Phosphatase 140 U/L      ALT (SGPT) 11 U/L       AST (SGOT) 19 U/L      Creatinine 0.57 mg/dL      Total Bilirubin <0.2 mg/dL       U/L      Comment: Specimen hemolyzed.  Results may be affected.        Uric Acid 4.9 mg/dL     Creatinine clearance serum [[id number]]  (Normal) Collected: 03/08/22 1912    Specimen: Blood Updated: 03/08/22 2001     Creatinine 0.57 mg/dL      eGFR 120.2 mL/min/1.73      Comment: National Kidney Foundation and American Society of Nephrology (ASN) Task Force recommended calculation based on the Chronic Kidney Disease Epidemiology Collaboration (CKD-EPI) equation refit without adjustment for race.       Narrative:      GFR Normal >60  Chronic Kidney Disease <60  Kidney Failure <15      CBC (No Diff) [[id number]]  (Abnormal) Collected: 03/08/22 1912    Specimen: Blood Updated: 03/08/22 1940     WBC 11.14 10*3/mm3      RBC 3.88 10*6/mm3      Hemoglobin 11.5 g/dL      Hematocrit 34.7 %      MCV 89.4 fL      MCH 29.6 pg      MCHC 33.1 g/dL      RDW 13.2 %      RDW-SD 43.4 fl      MPV 11.6 fL      Platelets 248 10*3/mm3     COVID PRE-OP / PRE-PROCEDURE SCREENING ORDER (NO ISOLATION) - Swab, Nasopharynx [[id number]]  (Normal) Collected: 03/08/22 1750    Specimen: Swab from Nasopharynx Updated: 03/08/22 1857    Narrative:      The following orders were created for panel order COVID PRE-OP / PRE-PROCEDURE SCREENING ORDER (NO ISOLATION) - Swab, Nasopharynx.  Procedure                               Abnormality         Status                     ---------                               -----------         ------                     COVID-19, ABBOTT IN-HOUS...[[id number]]  Normal              Final result                 Please view results for these tests on the individual orders.    COVID-19, ABBOTT IN-HOUSE,NASAL Swab (NO TRANSPORT MEDIA) 2 HR TAT - Swab, Nasopharynx [[id number]]  (Normal) Collected: 03/08/22 1750    Specimen: Swab from Nasopharynx Updated: 03/08/22 1857     COVID19 Presumptive Negative    Narrative:      Fact sheet for  providers: https://www.fda.gov/media/947083/download     Fact sheet for patients: https://www.fda.gov/media/845328/download    Test performed by PCR.  If inconsistent with clinical signs and symptoms patient should be tested with different authorized molecular test.          Imaging Results (Last 48 Hours)     Procedure Component Value Units Date/Time    SCANNED -  ULTRASOUND [[id number]] Resulted: 22     Updated: 22          Orders (last 48 hrs)      Start     Ordered    03/10/22 0739  diphenhydrAMINE (BENADRYL) capsule 25 mg  Every 6 Hours PRN         03/10/22 0739    22 0900  labetalol (NORMODYNE) tablet 300 mg  3 Times Daily         22 0809    22  Antibody Identification  Once         22 21122 2100  sodium chloride 0.9 % flush 10 mL  Every 12 Hours Scheduled         22 1703    03/08/22 2100  labetalol (NORMODYNE) tablet 200 mg  3 Times Daily,   Status:  Discontinued         22 1704    22 2100  OXcarbazepine (TRILEPTAL) tablet 300 mg  Nightly         22 1719    22 1902  clonazePAM (KlonoPIN) tablet 0.5 mg  2 Times Daily PRN         22 1903    22 1815  lamoTRIgine (LaMICtal) tablet 150 mg  Daily         22 1719    22 1800  betamethasone acetate-betamethasone sodium phosphate (CELESTONE SOLUSPAN) injection 12 mg  Every 24 Hours         22 1703    03/08/22 1800  Fetal Nonstress Test  3 Times Daily      Comments: No chief complaint on file.      22 1703    03/08/22 1800  aspirin EC tablet 81 mg  Daily         22 17022 1800  levothyroxine (SYNTHROID, LEVOTHROID) tablet 75 mcg  Daily         22 17022 174  COVID PRE-OP / PRE-PROCEDURE SCREENING ORDER (NO ISOLATION) - Swab, Nasopharynx  Once         22 1739    22 174  COVID-19, ABBOTT IN-HOUSE,NASAL Swab (NO TRANSPORT MEDIA) 2 HR TAT - Swab, Nasopharynx  PROCEDURE ONCE         22 8225     03/08/22 1704  Creatinine Clearance - Urine, Clean Catch  Once         03/08/22 1703    03/08/22 1704  Protein, Urine, 24 Hour - Urine, Clean Catch  Once         03/08/22 1703    03/08/22 1704  Creatinine clearance serum  PROCEDURE ONCE         03/08/22 1703    03/08/22 1704  Creatinine Clearance, Urine, 24 Hour - Urine, Clean Catch  PROCEDURE ONCE         03/08/22 1703    03/08/22 1703  Place Sequential Compression Device  Once         03/08/22 1703    03/08/22 1703  Maintain Sequential Compression Device  Continuous         03/08/22 1703    03/08/22 1703  Diet Regular  Diet Effective Now         03/08/22 1703    03/08/22 1702  Admit To Obstetrics Inpatient  Once         03/08/22 1703    03/08/22 1702  Code Status and Medical Interventions:  Continuous         03/08/22 1703    03/08/22 1702  Vital Signs Per hospital policy  Per Hospital Policy         03/08/22 1703    03/08/22 1702  Intermittent Auscultation FOR LOW RISK PATIENTS - NST on Admission Along With Intermittent Auscultation of Fetal Heart Tones.  Per Order Details        Comments: Intermittent Auscultation FOR LOW RISK PATIENTS - NST on Admission Along With Intermittent Auscultation of Fetal Heart Tones.    03/08/22 1703 03/08/22 1702  For Antepartum Patients Greater Than 24 Weeks - NST Daily and Monitor Fetal Heart Tones for One Hour 3 Times Daily and PRN.  Per Order Details        Comments: For Antepartum Patients Greater Than 24 Weeks - NST Daily & Monitor Fetal Heart Tones For One Hour 3 Times Daily & PRN.    03/08/22 1703    03/08/22 1702  For Antepartum Patients Less Than 24 Weeks - Document Fetal Heart Tones Daily and PRN.  Per Order Details        Comments: For Antepartum Patients Less Than 24 Weeks - Document Fetal Heart Tones Daily & PRN.    03/08/22 1703    03/08/22 1702  Notify Physician (specified)  Until Discontinued         03/08/22 1703 03/08/22 1702  Notify physician for hyperstimulus (per hospital algorithm)  Until Discontinued          22 170  Notify physician if membranes ruptured, bleeding greater than 1 pad an hour, fetal heart tone abnormality, and severe pain  Until Discontinued         22 170  Initiate Group Beta Strep (GBS) Prophylaxis Protocol, If Criteria Met  Continuous        Comments: NO TREATMENT RECOMMENDED IF: 1)  Maternal GBS status known negative 2)  Scheduled  birth with intact membranes, not in labor.  3 ) Maternal GBS unknown, no risk factors.   TREAT WITH ANTIBIOTICS IF:  1)  Maternal GBS status is known postive.  2)  Maternal GBS status unknown with these risk factors:  a)  Previous infant affected by GBS infection.  b)  GBS urinary tract infection (UTI) or bacteruria during pregnancy  c)  Unexplained maternal fever in labor (greater than or equal to 100.4F o  or 38.0C)  d)  Prolonged rupture of the membranes greater than or equal to 18 hours.  e)  Gestational age less than 37 weeks.    22 170  CBC (No Diff)  Once         223    22 170  Type & Screen  Once         223    22 170  Preeclampsia Panel  Once         223    22 1702  Insert Peripheral IV  Once         223    22 170  Saline Lock & Maintain IV Access  Continuous         22 170  lidocaine PF 1% (XYLOCAINE) injection 5 mL  As Needed         22 170  sodium chloride 0.9 % flush 1-10 mL  As Needed         22    Unscheduled  Position Change - For Intra-Uterine Resusitation for Hypertonus, HyperStimulation or Non-Reassuring Fetal Status  As Needed       22    --  SCANNED -  ULTRASOUND         22 0000                 Physician Progress Notes (last 48 hours)      Monique Dasilva MD at 03/10/22 0839          3/10/2022  HD:2  36 y.o. female  at 30w1d    Subjective   Bianca feels well. No headache, visual chabnges RUQ pain    Her 24h  urine returned 520mg protein       Objective   Temp: Temp:  [97.6 °F (36.4 °C)-99.4 °F (37.4 °C)] 98 °F (36.7 °C) Temp src: Axillary   BP: BP: (118-147)/(70-85) 147/82        Pulse: Heart Rate:  [91-98] 98  RR: Resp:  [16] 16    General:  nad   Abdomen: Gravid, nontender         Lab Results   Component Value Date    WBC 11.14 (H) 2022    HGB 11.5 (L) 2022    HCT 34.7 2022    MCV 89.4 2022     2022    HEPBSAG Negative 2021     Results from last 7 days   Lab Units 22   AST (SGOT) U/L 19     Results from last 7 days   Lab Units 22   ALT (SGPT) U/L 11      Results from last 7 days   Lab Units 22   CREATININE mg/dL 0.57  0.57      Results from last 7 days   Lab Units 22   BILIRUBIN mg/dL <0.2         Assessment  1.   36 y.o. yo female  at 30w1d  2. CHTN, with superimposed preeclampsia. Her 24h urine is up from 278 to 520mg. Her bps are stable on labtelol 300 TID, nl-mild range. Labs have been normal. No symptoms. Fetal monitoring reassuring.     Plan  1. Cont current hospital care.   2. Will discuss with PDC plan.    This note has been electronically signed.    Monique Dasilva MD  March 10, 2022    Electronically signed by Monique Dasilva MD at 03/10/22 0841     Monique Dasilva MD at 22 0810          3/9/2022  HD:1  36 y.o. female  at 30w0d    Subjective   Bianca feels well. Denies headache, visual changes, RUQ pain. Good fm. No contractions.            Objective   Temp: Temp:  [97.7 °F (36.5 °C)-98.2 °F (36.8 °C)] 97.7 °F (36.5 °C) Temp src: Oral   BP: BP: (138-181)/(78-99) 140/80        Pulse: Heart Rate:  [] 94  RR: Resp:  [16-20] 16    General:  nad   Abdomen: Gravid, nontender         Lab Results   Component Value Date    WBC 11.14 (H) 2022    HGB 11.5 (L) 2022    HCT 34.7 2022    MCV 89.4 2022     2022    HEPBSAG Negative 2021     Results from last 7 days    Lab Units 22   AST (SGOT) U/L 19     Results from last 7 days   Lab Units 22   ALT (SGPT) U/L 11      Results from last 7 days   Lab Units 22   CREATININE mg/dL 0.57  0.57      Results from last 7 days   Lab Units 22   BILIRUBIN mg/dL <0.2         Assessment/ plan  1.   36 y.o. yo female  at 30w0d  2. CHTN, that has been controlled on labetelol 200 TID, with a history of preeclampsia requiring delivery at 32 wks with prev pregnancy. On US yesterday baby had AC lag with elevated cord dopplers, and she had a severe range pressure and so was sent in for r/o preeclmapsia. She had a few 160 BPs yesterday after admission. Overnight they have been normal-mild range. Her labs are normal and she has no symptoms. Her 24h urine is pending and her second steroids dose will be this evening. At this point it doesn't seem to be severe disease. Will increase her labetelol to 300 TID and will continue monitoring until tomorrow inpatient. Depending on 24h urine results, as well as her repeated BP monitoring, will make decision about inpatient or outpatient monitoring.   3. IVF, prev c/s, AMA, hypothyroid, mood disorder/anxiety        This note has been electronically signed.    Monique Dasilva MD  2022    Electronically signed by Monique Dasilva MD at 22 0817       Consult Notes (last 48 hours)  Notes from 22 1002 through 03/10/22 1002   No notes of this type exist for this encounter.

## 2022-03-11 ENCOUNTER — APPOINTMENT (OUTPATIENT)
Dept: WOMENS IMAGING | Facility: HOSPITAL | Age: 37
End: 2022-03-11

## 2022-03-11 VITALS
WEIGHT: 207 LBS | TEMPERATURE: 97.8 F | HEIGHT: 66 IN | SYSTOLIC BLOOD PRESSURE: 166 MMHG | DIASTOLIC BLOOD PRESSURE: 86 MMHG | RESPIRATION RATE: 16 BRPM | BODY MASS INDEX: 33.27 KG/M2 | HEART RATE: 92 BPM

## 2022-03-11 PROBLEM — O09.523 MULTIGRAVIDA OF ADVANCED MATERNAL AGE IN THIRD TRIMESTER: Status: ACTIVE | Noted: 2021-11-30

## 2022-03-11 LAB
ALP SERPL-CCNC: 142 U/L (ref 39–117)
ALT SERPL W P-5'-P-CCNC: 12 U/L (ref 1–33)
AST SERPL-CCNC: 16 U/L (ref 1–32)
BILIRUB SERPL-MCNC: <0.2 MG/DL (ref 0–1.2)
CREAT SERPL-MCNC: 0.58 MG/DL (ref 0.57–1)
DEPRECATED RDW RBC AUTO: 45.3 FL (ref 37–54)
ERYTHROCYTE [DISTWIDTH] IN BLOOD BY AUTOMATED COUNT: 13.4 % (ref 12.3–15.4)
HCT VFR BLD AUTO: 35.1 % (ref 34–46.6)
HGB BLD-MCNC: 11.4 G/DL (ref 12–15.9)
LDH SERPL-CCNC: 157 U/L (ref 135–214)
MCH RBC QN AUTO: 29.7 PG (ref 26.6–33)
MCHC RBC AUTO-ENTMCNC: 32.5 G/DL (ref 31.5–35.7)
MCV RBC AUTO: 91.4 FL (ref 79–97)
PLATELET # BLD AUTO: 248 10*3/MM3 (ref 140–450)
PMV BLD AUTO: 11.2 FL (ref 6–12)
RBC # BLD AUTO: 3.84 10*6/MM3 (ref 3.77–5.28)
URATE SERPL-MCNC: 4.9 MG/DL (ref 2.4–5.7)
WBC NRBC COR # BLD: 11.49 10*3/MM3 (ref 3.4–10.8)

## 2022-03-11 PROCEDURE — 82565 ASSAY OF CREATININE: CPT | Performed by: OBSTETRICS & GYNECOLOGY

## 2022-03-11 PROCEDURE — 76820 UMBILICAL ARTERY ECHO: CPT | Performed by: OBSTETRICS & GYNECOLOGY

## 2022-03-11 PROCEDURE — 76819 FETAL BIOPHYS PROFIL W/O NST: CPT | Performed by: OBSTETRICS & GYNECOLOGY

## 2022-03-11 PROCEDURE — 59025 FETAL NON-STRESS TEST: CPT

## 2022-03-11 PROCEDURE — 99231 SBSQ HOSP IP/OBS SF/LOW 25: CPT | Performed by: OBSTETRICS & GYNECOLOGY

## 2022-03-11 PROCEDURE — 76819 FETAL BIOPHYS PROFIL W/O NST: CPT

## 2022-03-11 PROCEDURE — 83615 LACTATE (LD) (LDH) ENZYME: CPT | Performed by: OBSTETRICS & GYNECOLOGY

## 2022-03-11 PROCEDURE — 82247 BILIRUBIN TOTAL: CPT | Performed by: OBSTETRICS & GYNECOLOGY

## 2022-03-11 PROCEDURE — 84460 ALANINE AMINO (ALT) (SGPT): CPT | Performed by: OBSTETRICS & GYNECOLOGY

## 2022-03-11 PROCEDURE — 84075 ASSAY ALKALINE PHOSPHATASE: CPT | Performed by: OBSTETRICS & GYNECOLOGY

## 2022-03-11 PROCEDURE — 84550 ASSAY OF BLOOD/URIC ACID: CPT | Performed by: OBSTETRICS & GYNECOLOGY

## 2022-03-11 PROCEDURE — 76820 UMBILICAL ARTERY ECHO: CPT

## 2022-03-11 PROCEDURE — 84450 TRANSFERASE (AST) (SGOT): CPT | Performed by: OBSTETRICS & GYNECOLOGY

## 2022-03-11 PROCEDURE — 85027 COMPLETE CBC AUTOMATED: CPT | Performed by: OBSTETRICS & GYNECOLOGY

## 2022-03-11 PROCEDURE — 99238 HOSP IP/OBS DSCHRG MGMT 30/<: CPT | Performed by: OBSTETRICS & GYNECOLOGY

## 2022-03-11 RX ORDER — LABETALOL 300 MG/1
300 TABLET, FILM COATED ORAL 3 TIMES DAILY
Qty: 90 TABLET | Refills: 0 | Status: SHIPPED | OUTPATIENT
Start: 2022-03-12 | End: 2022-04-11

## 2022-03-11 RX ADMIN — LABETALOL HYDROCHLORIDE 300 MG: 300 TABLET, FILM COATED ORAL at 16:45

## 2022-03-11 RX ADMIN — LABETALOL HYDROCHLORIDE 300 MG: 300 TABLET, FILM COATED ORAL at 01:06

## 2022-03-11 RX ADMIN — LAMOTRIGINE 150 MG: 100 TABLET ORAL at 09:06

## 2022-03-11 RX ADMIN — ASPIRIN 81 MG: 81 TABLET, COATED ORAL at 09:02

## 2022-03-11 RX ADMIN — LABETALOL HYDROCHLORIDE 300 MG: 300 TABLET, FILM COATED ORAL at 09:01

## 2022-03-11 RX ADMIN — LEVOTHYROXINE SODIUM 75 MCG: 0.07 TABLET ORAL at 09:01

## 2022-03-11 RX ADMIN — Medication 10 ML: at 13:51

## 2022-03-11 NOTE — PROGRESS NOTES
Daily Progress Note    Patient name: Bianca Mercado  YOB: 1985   MRN: [id number]  Admission Date: 3/8/2022  Date of Service: 3/11/2022  Referring Provider: Monique Dasilva MD    Bianca Mercado is a 36 y.o.    at 30w2d  admitted on 3/8/2022 for HTN complicating peripregnancy, antepartum    Hospital day 3      Diagnoses:   Patient Active Problem List    Diagnosis    • *HTN complicating peripregnancy, antepartum [O16.9]    • Chronic hypertension affecting pregnancy [O10.919]    • Multigravida of advanced maternal age in third trimester [O09.523]    • Previous  section [Z98.891]    • Rh negative, antepartum [O26.899, Z67.91]    • Hx of preeclampsia, prior pregnancy, currently pregnant [O09.299]    • Anxiety [F41.9]    • Depression [F32.A]    • Acquired hypothyroidism [E03.9]    • Simple goiter [E04.0]    • Pregnancy resulting from assisted reproductive technology in second trimester [O09.812]        Chief Complaint:  Chief Complaint   Patient presents with   • Elevated Blood Pressure       Subjective:      Bianca has no complaints this afternoon and would like to go home. She states that she can check her BP as well at home and can return with severe BP or features.  Reports fetal movement is normal  Denies leakage of amniotic fluid.  Denies vaginal bleeding    Objective:     Vital signs:  Temp:  [97.6 °F (36.4 °C)-98.4 °F (36.9 °C)] 97.8 °F (36.6 °C)  Heart Rate:  [75-92] 92  Resp:  [16] 16  BP: (129-168)/(69-86) 166/86    Abdomen: soft, nontender  Uterus: gravid, nontender  Extremities: nontender; no edema        Non-Stress Test: chronic hypertension  Duration >20 minutes  Reactive NST    Fetal Heart Rate Assessment   Method: Fetal HR Assessment Method: external   Beats/min: Fetal HR (beats/min): 145   Baseline: Fetal HR Baseline: normal range   Variability: Fetal HR Variability: moderate (amplitude range 6 to 25 bpm)   Accels: Fetal HR Accelerations: greater than/equal to 15  bpm   Decels: Fetal HR Decelerations: variable   Tracing Category:  1     Uterine Assessment   Method: Method: external tocotransducer   Frequency (min): Contraction Frequency (Minutes): x1   Ctx Count in 10 min:     Duration:     Intensity: Contraction Intensity: no contractions   Intensity by IUPC:     Resting Tone: Uterine Resting Tone: soft by palpation   Resting Tone by IUPC:     Ranjana Units:                           Most recent ultrasound:  3/11/22    Medications:  aspirin, 81 mg, Oral, Daily  labetalol, 300 mg, Oral, TID  lamoTRIgine, 150 mg, Oral, Daily  levothyroxine, 75 mcg, Oral, Daily  OXcarbazepine, 300 mg, Oral, Nightly  sodium chloride, 10 mL, Intravenous, Q12H       clonazePAM  •  diphenhydrAMINE  •  lidocaine PF 1%  •  sodium chloride    Labs:  Lab Results (last 24 hours)     Procedure Component Value Units Date/Time    Preeclampsia Panel [[id number]]  (Abnormal) Collected: 22    Specimen: Blood Updated: 22     Alkaline Phosphatase 142 U/L      ALT (SGPT) 12 U/L      AST (SGOT) 16 U/L      Creatinine 0.58 mg/dL      Total Bilirubin <0.2 mg/dL       U/L      Uric Acid 4.9 mg/dL     CBC (No Diff) [[id number]]  (Abnormal) Collected: 22    Specimen: Blood Updated: 22     WBC 11.49 10*3/mm3      RBC 3.84 10*6/mm3      Hemoglobin 11.4 g/dL      Hematocrit 35.1 %      MCV 91.4 fL      MCH 29.7 pg      MCHC 32.5 g/dL      RDW 13.4 %      RDW-SD 45.3 fl      MPV 11.2 fL      Platelets 248 10*3/mm3         Lab Results   Component Value Date    HGB 11.4 (L) 2022         Assessment/Plan:      Bianca is a 36 y.o.    at 30w2d.  1. HTN complicating peripregnancy, antepartum: improved BP with increased labetalol. Reviewed [facility] recs today. Labs normal and she is asymptomatic. She has f/u appointment next week and would like to go home this afternoon.     .  All questions were answered to the best my ability.    Balbir Moreno,  MD  3/11/2022

## 2022-03-11 NOTE — DISCHARGE SUMMARY
Admission date: 3/8/2022  Discharge date: 22    Referring Provider: Monique Dasilva MD    Admission diagnosis:  HTN complicating peripregnancy, antepartum [O16.9]    Patient Active Problem List   Diagnosis   • Pregnancy resulting from assisted reproductive technology in second trimester   • Acquired hypothyroidism   • Simple goiter   • Rh negative, antepartum   • Hx of preeclampsia, prior pregnancy, currently pregnant   • Anxiety   • Depression   • Multigravida of advanced maternal age in third trimester   • Previous  section   • Chronic hypertension affecting pregnancy   • HTN complicating peripregnancy, antepartum       Discharge diagnosis:  Chronic hypertension   Hypothyroidism  AMA  Previous  section    Consultants:  [facility]    Hospital course:  The patient was admitted for elevated BP with known history of chronic hypertension. She was evaluated with serial BP, labs, NSTs, and [facility] consultation. She was found to have no evidence of preeclampsia or severe disease, but BP better controlled with increased labetalol. She was requesting discharge today and can manage as outpatient.       Vitals:    22 1529   BP: 166/86   Pulse: 92   Resp:    Temp:        GENERAL:  Well-developed, well-nourished in no acute distress.   ABD:  Gravid  EXTREMITIES:  No clubbing, cyanosis or edema.  PSYCHIATRIC:  Normal affect and mood.      Discharge condition: stable  Discharge diet   Dietary Orders (From admission, onward)     Start     Ordered    22  Diet Regular  Diet Effective Now        Question:  Diet Texture / Consistency  Answer:  Regular    22 1703              Additional Instructions: Call with fevers, uncontrolled nausea/vomiting/pain.  Medications:      Discharge Medications      Changes to Medications      Instructions Start Date   labetalol 300 MG tablet  Commonly known as: NORMODYNE  What changed:   · medication strength  · how much to take   300 mg, Oral, 3 Times Daily   Start Date:  March 12, 2022        Continue These Medications      Instructions Start Date   aspirin 81 MG EC tablet   81 mg, Oral, Daily      clonazePAM 0.5 MG tablet  Commonly known as: KlonoPIN   0.5 mg, Oral, 2 Times Daily PRN      lamoTRIgine 100 MG tablet  Commonly known as: LaMICtal   150 mg, Daily      levothyroxine 75 MCG tablet  Commonly known as: Synthroid   75 mcg, Oral, Daily      OXcarbazepine 300 MG tablet  Commonly known as: TRILEPTAL   300 mg, Oral, Nightly      PRENATAL VITAMIN PO   Oral           Disposition:Home or Self Care  Follow up:   Future Appointments   Date Time Provider Department Center   3/15/2022  4:30 PM Monique Dasilva MD [facility]   3/18/2022  8:45 AM [facility] US 1  [facility]   3/18/2022  8:45 AM [facility] FOLLOW UP [facility] None   3/22/2022  3:40 PM Monique Dasilva MD [facility]   3/25/2022 11:30 AM [facility] US 1  [facility]   3/25/2022 11:30 AM [facility] FOLLOW UP [facility] None   4/1/2022 11:30 AM [facility] US 1  [facility]   4/1/2022 11:30 AM [facility] FOLLOW UP [facility] None   7/26/2022  9:45 AM Mark Walker MD [facility]       Over 30 minutes on discharge.  Counseling and coordinating care.    Balbir Moreno MD

## 2022-03-11 NOTE — PROGRESS NOTES
Daily Progress Note    Patient name: Bianca Mercado  YOB: 1985   MRN: [id number]  Admission Date: 3/8/2022  Date of Service: 3/11/2022  Referring Provider: Monique Dasilva MD    Bianca Mercado is a 36 y.o.    at 30w2d  admitted on 3/8/2022 for HTN complicating peripregnancy, antepartum    Hospital day 3      Diagnoses:   Patient Active Problem List    Diagnosis    • *HTN complicating peripregnancy, antepartum [O16.9]    • Chronic hypertension affecting pregnancy [O10.919]    • Multigravida of advanced maternal age in third trimester [O09.523]    • Previous  section [Z98.891]    • Rh negative, antepartum [O26.899, Z67.91]    • Hx of preeclampsia, prior pregnancy, currently pregnant [O09.299]    • Anxiety [F41.9]    • Depression [F32.A]    • Acquired hypothyroidism [E03.9]    • Simple goiter [E04.0]    • Pregnancy resulting from assisted reproductive technology in second trimester [O09.812]        Chief Complaint:  Chief Complaint   Patient presents with   • Elevated Blood Pressure       Subjective:      Bianca has no complaints today.  Reports fetal movement is normal  Denies leakage of amniotic fluid.  Denies vaginal bleeding    Objective:     Vital signs:  Temp:  [97.6 °F (36.4 °C)-98.4 °F (36.9 °C)] 97.6 °F (36.4 °C)  Heart Rate:  [75-97] 75  Resp:  [16] 16  BP: (129-157)/(69-89) 139/69    Abdomen: soft, nontender  Uterus: gravid, nontender  Extremities: nontender; no edema        Non-Stress Test:    Fetal Heart Rate Assessment   Method: Fetal HR Assessment Method: external   Beats/min: Fetal HR (beats/min): 140   Baseline: Fetal HR Baseline: normal range   Variability: Fetal HR Variability: moderate (amplitude range 6 to 25 bpm)   Accels: Fetal HR Accelerations: greater than/equal to 15 bpm, lasting at least 15 seconds   Decels: Fetal HR Decelerations: absent   Tracing Category:       Uterine Assessment   Method: Method: external tocotransducer   Frequency (min):  Contraction Frequency (Minutes): x1   Ctx Count in 10 min:     Duration:     Intensity: Contraction Intensity: no contractions   Intensity by IUPC:     Resting Tone: Uterine Resting Tone: soft by palpation   Resting Tone by IUPC:     Ranjana Units:       Cervix: Exam by:     Dilation:     Effacement:     Station:         Most recent ultrasound:  Per Dr. Iverson this morning    Medications:  aspirin, 81 mg, Oral, Daily  labetalol, 300 mg, Oral, TID  lamoTRIgine, 150 mg, Oral, Daily  levothyroxine, 75 mcg, Oral, Daily  OXcarbazepine, 300 mg, Oral, Nightly  sodium chloride, 10 mL, Intravenous, Q12H       clonazePAM  •  diphenhydrAMINE  •  lidocaine PF 1%  •  sodium chloride    Labs:  Lab Results (last 24 hours)     Procedure Component Value Units Date/Time    Preeclampsia Panel [[id number]]  (Abnormal) Collected: 22    Specimen: Blood Updated: 22     Alkaline Phosphatase 142 U/L      ALT (SGPT) 12 U/L      AST (SGOT) 16 U/L      Creatinine 0.58 mg/dL      Total Bilirubin <0.2 mg/dL       U/L      Uric Acid 4.9 mg/dL     CBC (No Diff) [[id number]]  (Abnormal) Collected: 22    Specimen: Blood Updated: 22     WBC 11.49 10*3/mm3      RBC 3.84 10*6/mm3      Hemoglobin 11.4 g/dL      Hematocrit 35.1 %      MCV 91.4 fL      MCH 29.7 pg      MCHC 32.5 g/dL      RDW 13.4 %      RDW-SD 45.3 fl      MPV 11.2 fL      Platelets 248 10*3/mm3         Lab Results   Component Value Date    HGB 11.4 (L) 2022         Assessment/Plan:      Bianca is a 36 y.o.    at 30w2d.  1. HTN complicating peripregnancy, antepartum:  2.  Dr. Bowman evaluation this morning reveals a stable situation continues to recommend biophysical profiles once a week.  Her blood pressures right now are stable  3. Fetal: NSTs bid; BPPs twice weekly, growth q 3-4 weeks.  4. Delivery plan: cephalic, for vaginal delivery.    .  All questions were answered to the best my ability.    Daniela Bond,  MD  3/11/2022

## 2022-03-11 NOTE — PROGRESS NOTES
Maternal-Fetal Medicine   Progress Note    Patient name: Bianca Mercado  YOB: 1985   MRN: [id number]  Admission Date: 3/8/2022  Date of Service: 3/11/2022  Referring Provider: Monique Dasilva       Bianca Mercado is a 36 y.o.    at 30w2d  admitted on 3/8/2022 for HTN complicating peripregnancy, antepartum (chronic hypertension with superimposed preeclampsia)    Hospital day 3    Chief Complaint   Patient presents with   • Elevated Blood Pressure       Diagnoses:   Patient Active Problem List   Diagnosis   • Pregnancy resulting from assisted reproductive technology in second trimester   • Acquired hypothyroidism   • Simple goiter   • Rh negative, antepartum   • Hx of preeclampsia, prior pregnancy, currently pregnant   • Anxiety   • Depression   • Multigravida of advanced maternal age in third trimester   • Previous  section   • Chronic hypertension affecting pregnancy   • HTN complicating peripregnancy, antepartum       Subjective:      Bianca has no complaints today.   Patient denies  headache:   Patient denies  right upper quadrant pain:   Reports fetal movement is normal  Denies leakage of amniotic fluid.  Denies vaginal bleeding    Objective:     Vital signs:  Temp:  [97.6 °F (36.4 °C)-98.4 °F (36.9 °C)] 97.6 °F (36.4 °C)  Heart Rate:  [75-98] 75  Resp:  [16] 16  BP: (129-157)/(69-89) 139/69    Abdomen: soft, nontender  Uterus: gravid, nontender, vertex presentation  Extremities: nontender; no edema     Fetal heart rate tracing review  FHT's: Category 1 with occasional variable deceleration  TOCO: irregular    Cervix: last check      Most recent ultrasound:  Single viable intrauterine pregnancy in a vertex presentation  BPP equals 8 of 8  JOLENE equals 15.9 cm  Umbilical artery SD ratio 3.1-3.6 to 1 (normal)    Medications:  aspirin, 81 mg, Oral, Daily  labetalol, 300 mg, Oral, TID  lamoTRIgine, 150 mg, Oral, Daily  levothyroxine, 75 mcg, Oral, Daily  OXcarbazepine, 300 mg,  Oral, Nightly  sodium chloride, 10 mL, Intravenous, Q12H       clonazePAM  •  diphenhydrAMINE  •  lidocaine PF 1%  •  sodium chloride    Labs:  Lab Results   Component Value Date    HGB 11.4 (L) 2022     Lab Results   Component Value Date    GLUCOSE 103 (H) 2018     24-hour urine with 520 mg of protein per day.  A.m. labs 3/11/2022 platelet 248,000    Assessment/Plan:      Bianca is a 36 y.o.    at 30w2d.  1.  Chronic hypertension with superimposed preeclampsia  2.  Advanced maternal age  3.  Previous  delivery x1  4.  Pregnancy is a result of IVF therapy with donor sperm  5.  Previous pregnancy complicated by  delivery secondary to preeclampsia  6.  Fetal abdominal circumference measurement lag  7.  Status post steroids for fetal lung maturity enhancement  8.  Reassuring biophysical profile and umbilical artery Doppler studies today    Plan:   1.  Await results of preeclampsia panel today  2.  No current change in her antihypertensive therapy  3.  Continue to monitor severity of superimposed preeclampsia  4.  Weekly BPP with Dopplers given history of elevated umbilical artery Doppler and abdominal circumference measurement lag    I spent 15 minutes with this patient of which greater than 50% was face to face counseling and coordination of care.  All questions were answered to the best of my ablity.    Carlin Iverson MD  3/11/2022  08:07 EST

## 2022-03-11 NOTE — DISCHARGE INSTR - OTHER ORDERS
Continue taking your blood pressures at home , like you have been. Call your OB if you start having high blood pressures or any signs and symptoms of preeclampsia

## 2022-03-14 NOTE — PAYOR COMM NOTE
"  Auth#[id number]    Discharged 3/11/22.    From: Bethanie Paez LPN, Utilization Review  Phone #[phone]  Fax #[phone]    Bianca Mercado (36 y.o. Female)             Date of Birth   1985    Social Security Number       Address   [address] [address]    Home Phone   [phone]    MRN   [id number]       Hol[employer]   [employer]    Marital Status   Single                            Admission Date   3/8/22    Admission Type   Elective    Admitting Provider   Monique Dasilva MD    Attending Provider       Department, Room/Bed   [facility] ANTEPARTUM, N329/1       Discharge Date   3/11/2022    Discharge Disposition   Home or Self Care    Discharge Destination                               Attending Provider: (none)   Allergies: Sulfa Antibiotics    Isolation: None   Infection: None   Code Status: Prior   Advance Care Planning Activity    Ht: 167.6 cm (66\")   Wt: 93.9 kg (207 lb)    Admission Cmt: None   Principal Problem: HTN complicating peripregnancy, antepartum [O16.9]                 Active Insurance as of 3/8/2022     Primary Coverage     Payor Plan Insurance Group Employer/Plan Group    [facility] [employer] [id number]     Payor Plan Address Payor Plan Phone Number Payor Plan Fax Number Effective Dates    [address] [phone]  1/1/2015 - None Entered    [place] [address]       Subscriber Name Subscriber Birth Date Member ID       BIANCA MERCADO 1985 [id number]                 Emergency Contacts      (Rel.) Home Phone Work Phone Mobile Phone    Fatoumata Mercado (Mother) -- -- [phone]               Discharge Summary      Balbir Moreno MD at 03/11/22 1653          Admission date: 3/8/2022  Discharge date: 03/11/22    Referring Provider: Monique Dasilva MD    Admission diagnosis:  HTN complicating peripregnancy, antepartum [O16.9]    Patient Active Problem List   Diagnosis   • Pregnancy resulting " from assisted reproductive technology in second trimester   • Acquired hypothyroidism   • Simple goiter   • Rh negative, antepartum   • Hx of preeclampsia, prior pregnancy, currently pregnant   • Anxiety   • Depression   • Multigravida of advanced maternal age in third trimester   • Previous  section   • Chronic hypertension affecting pregnancy   • HTN complicating peripregnancy, antepartum       Discharge diagnosis:  Chronic hypertension   Hypothyroidism  AMA  Previous  section    Consultants:  [facility]    Hospital course:  The patient was admitted for elevated BP with known history of chronic hypertension. She was evaluated with serial BP, labs, NSTs, and [facility] consultation. She was found to have no evidence of preeclampsia or severe disease, but BP better controlled with increased labetalol. She was requesting discharge today and can manage as outpatient.       Vitals:    22 1529   BP: 166/86   Pulse: 92   Resp:    Temp:        GENERAL:  Well-developed, well-nourished in no acute distress.   ABD:  Gravid  EXTREMITIES:  No clubbing, cyanosis or edema.  PSYCHIATRIC:  Normal affect and mood.      Discharge condition: stable  Discharge diet   Dietary Orders (From admission, onward)     Start     Ordered    22  Diet Regular  Diet Effective Now        Question:  Diet Texture / Consistency  Answer:  Regular    22              Additional Instructions: Call with fevers, uncontrolled nausea/vomiting/pain.  Medications:      Discharge Medications      Changes to Medications      Instructions Start Date   labetalol 300 MG tablet  Commonly known as: NORMODYNE  What changed:   · medication strength  · how much to take   300 mg, Oral, 3 Times Daily   Start Date: 2022        Continue These Medications      Instructions Start Date   aspirin 81 MG EC tablet   81 mg, Oral, Daily      clonazePAM 0.5 MG tablet  Commonly known as: KlonoPIN   0.5 mg, Oral, 2 Times Daily PRN       lamoTRIgine 100 MG tablet  Commonly known as: LaMICtal   150 mg, Daily      levothyroxine 75 MCG tablet  Commonly known as: Synthroid   75 mcg, Oral, Daily      OXcarbazepine 300 MG tablet  Commonly known as: TRILEPTAL   300 mg, Oral, Nightly      PRENATAL VITAMIN PO   Oral           Disposition:Home or Self Care  Follow up:   Future Appointments   Date Time Provider Department Center   3/15/2022  4:30 PM Monique Dasilva MD [facility]   3/18/2022  8:45 AM [facility] US 1  [facility]   3/18/2022  8:45 AM [facility] FOLLOW UP [facility] None   3/22/2022  3:40 PM Monique Dasilva MD [facility]   3/25/2022 11:30 AM [facility] US 1 [facility]   3/25/2022 11:30 AM [facility] FOLLOW UP [facility] None   4/1/2022 11:30 AM [facility] US 1  [facility]   4/1/2022 11:30 AM [facility] FOLLOW UP [facility] None   7/26/2022  9:45 AM Mark Walker MD [facility]       Over 30 minutes on discharge.  Counseling and coordinating care.    Balbir Moreno MD        Electronically signed by Balbir Moreno MD at 03/11/22 6331

## 2022-03-15 ENCOUNTER — ROUTINE PRENATAL (OUTPATIENT)
Dept: OBSTETRICS AND GYNECOLOGY | Facility: CLINIC | Age: 37
End: 2022-03-15

## 2022-03-15 VITALS — DIASTOLIC BLOOD PRESSURE: 84 MMHG | BODY MASS INDEX: 32.67 KG/M2 | SYSTOLIC BLOOD PRESSURE: 170 MMHG | WEIGHT: 202.4 LBS

## 2022-03-15 DIAGNOSIS — Z87.59 HISTORY OF PRE-ECLAMPSIA: ICD-10-CM

## 2022-03-15 DIAGNOSIS — O09.523 MULTIGRAVIDA OF ADVANCED MATERNAL AGE IN THIRD TRIMESTER: ICD-10-CM

## 2022-03-15 DIAGNOSIS — O09.812 PREGNANCY RESULTING FROM ASSISTED REPRODUCTIVE TECHNOLOGY IN SECOND TRIMESTER: ICD-10-CM

## 2022-03-15 DIAGNOSIS — O10.919 CHRONIC HYPERTENSION AFFECTING PREGNANCY: ICD-10-CM

## 2022-03-15 DIAGNOSIS — Z98.891 PREVIOUS CESAREAN SECTION: ICD-10-CM

## 2022-03-15 DIAGNOSIS — Z67.91 RH NEGATIVE, ANTEPARTUM: Primary | ICD-10-CM

## 2022-03-15 DIAGNOSIS — Z3A.30 30 WEEKS GESTATION OF PREGNANCY: ICD-10-CM

## 2022-03-15 DIAGNOSIS — F41.9 ANXIETY: ICD-10-CM

## 2022-03-15 DIAGNOSIS — O36.5931 IUGR (INTRAUTERINE GROWTH RESTRICTION) AFFECTING CARE OF MOTHER, THIRD TRIMESTER, FETUS 1: ICD-10-CM

## 2022-03-15 DIAGNOSIS — O26.899 RH NEGATIVE, ANTEPARTUM: Primary | ICD-10-CM

## 2022-03-15 PROBLEM — O11.9 CHRONIC HYPERTENSION WITH SUPERIMPOSED PREECLAMPSIA: Status: ACTIVE | Noted: 2022-03-15

## 2022-03-15 LAB
GLUCOSE UR STRIP-MCNC: NEGATIVE MG/DL
PROT UR STRIP-MCNC: NEGATIVE MG/DL

## 2022-03-15 PROCEDURE — 0502F SUBSEQUENT PRENATAL CARE: CPT | Performed by: OBSTETRICS & GYNECOLOGY

## 2022-03-15 PROCEDURE — 59025 FETAL NON-STRESS TEST: CPT | Performed by: OBSTETRICS & GYNECOLOGY

## 2022-03-15 NOTE — PROGRESS NOTES
OB FOLLOW UP  CC- Here for care of pregnancy        Bianca Mercado is a 36 y.o.  30w6d patient being seen today for her obstetrical follow up visit. Patient reports no complaints. NST today reactive.         Reason for test: Hypertension, Intrauterine growth restriction, Other (Comment) (abnormal cord dopplers, advanced maternal age)  Date of Test: 3/15/2022  Time frame of test: >20min  NST Interpretation:  reactive    Her prenatal care is complicated by (and status) :    Patient Active Problem List   Diagnosis   • Pregnancy resulting from assisted reproductive technology in second trimester   • Acquired hypothyroidism   • Simple goiter   • Rh negative, antepartum   • Hx of preeclampsia, prior pregnancy, currently pregnant   • Anxiety   • Depression   • Multigravida of advanced maternal age in third trimester   • Previous  section   • Chronic hypertension affecting pregnancy   • HTN complicating peripregnancy, antepartum   • Chronic hypertension with superimposed preeclampsia     Fetal Non-Stress Test  Patient: Bianca Mercado  : 1985  MRN: [id number]  CSN: [id number]  Date: 03/15/2022    Estimated Date of Delivery: 22  Gestational Age: 30w6d    Indication for NST IUGR, Preeclampsia, Abnormal cord dopplers, AMA        Time On 5:20 pm   Time Off 1800 pm       Interpretation    Baseline  beats per minute   Category 1   Decelerations Absent                       Monique Dasilva MD    Ultrasound Today: No.    ROS -   Patient Reports : No Problems  Patient Denies: Loss of Fluid, Vaginal Spotting, Vision Changes, Headaches, Nausea , Vomiting , Contractions and Epigastric pain  Fetal Movement : normal  All other systems reviewed and are negative.       The additional following portions of the patient's history were reviewed and updated as appropriate: allergies, current medications, past family history, past medical history, past social history, past surgical history and problem  list.    I have reviewed and agree with the HPI, ROS, and historical information as entered above. Monique Dasilva MD    /84   Wt 91.8 kg (202 lb 6.4 oz)   LMP  (LMP Unknown)   BMI 32.67 kg/m²       EXAM:     FHT: + BPM   Uterine Size: size equals dates  Pelvic Exam: No    Urine glucose/protein: See prenatal flowsheet       Assessment and Plan    Problem List Items Addressed This Visit     Pregnancy resulting from assisted reproductive technology in second trimester    Overview     Fetal echo wnl           Rh negative, antepartum - Primary    Overview     Rhogam given 21           Anxiety    Overview     Mood disorder  On lamictal, trileptal at nob. Followed by psych- stable           Multigravida of advanced maternal age in third trimester    Overview     cfDNA low risk  AFP           Relevant Orders    Fetal Nonstress Test    Previous  section    Overview     32 wks for severe preeclampsia  LTUI           Chronic hypertension affecting pregnancy    Overview     labetelol 200 BID started 20 wks. TID 28 wks           Relevant Medications    labetalol (NORMODYNE) 300 MG tablet    Other Relevant Orders    Fetal Nonstress Test    Fetal Nonstress Test      Other Visit Diagnoses     30 weeks gestation of pregnancy        Relevant Orders    POC Urinalysis Dipstick (Completed)    Fetal Nonstress Test    History of pre-eclampsia        Relevant Orders    Fetal Nonstress Test    IUGR (intrauterine growth restriction) affecting care of mother, third trimester, fetus 1        Relevant Orders    Fetal Nonstress Test      CHTN with superimposed preeclampsia. Baby with IUGR.cord dopplers had improved last Friday.   Today her BP in the office is 170/90. She has been taking her BP at home 10-12 times a day and they have been overall mild range. She reports no symptoms. NST reactive today. Bianca is tearful as we talk, and reports that coming here makes her BP spike. I would like her to be admitted to the  hospital for monitoring, but she strongly wants to go home. She reports that she knew she was sick last pregnancy, but continues to feel well, and trusts that her bps are only high here. She has FU sched with PD[facility] on Thursday for BPP/ dopplers. She is going to bring her BP cuff in to check with theirs. She reports she understands risks of seizure, stroke, abruption, iufd, and will come in for any symptom or change.     1. Pregnancy at 30w6d. Has [facility] FU 3 days.   2. Fetal status reassuring.   3. Activity and Exercise discussed.  Return in about 1 week (around 3/22/2022) for NST Next Visit, F/U Prenatal.    Monique Dasilva MD  03/15/2022

## 2022-03-18 ENCOUNTER — HOSPITAL ENCOUNTER (INPATIENT)
Facility: HOSPITAL | Age: 37
LOS: 9 days | Discharge: HOME | End: 2022-03-27
Attending: OBSTETRICS & GYNECOLOGY | Admitting: OBSTETRICS & GYNECOLOGY

## 2022-03-18 ENCOUNTER — HOSPITAL ENCOUNTER (OUTPATIENT)
Dept: WOMENS IMAGING | Facility: HOSPITAL | Age: 37
Discharge: HOME | End: 2022-03-18
Admitting: OBSTETRICS & GYNECOLOGY

## 2022-03-18 ENCOUNTER — OFFICE VISIT (OUTPATIENT)
Dept: OBSTETRICS AND GYNECOLOGY | Facility: HOSPITAL | Age: 37
End: 2022-03-18

## 2022-03-18 VITALS — SYSTOLIC BLOOD PRESSURE: 154 MMHG | WEIGHT: 198 LBS | DIASTOLIC BLOOD PRESSURE: 90 MMHG | BODY MASS INDEX: 31.96 KG/M2

## 2022-03-18 DIAGNOSIS — O11.9 CHRONIC HYPERTENSION WITH SUPERIMPOSED PREECLAMPSIA: ICD-10-CM

## 2022-03-18 DIAGNOSIS — O10.919 CHRONIC HYPERTENSION AFFECTING PREGNANCY: Primary | ICD-10-CM

## 2022-03-18 DIAGNOSIS — O09.299 HX OF PREECLAMPSIA, PRIOR PREGNANCY, CURRENTLY PREGNANT: ICD-10-CM

## 2022-03-18 DIAGNOSIS — O10.919 CHRONIC HYPERTENSION AFFECTING PREGNANCY: ICD-10-CM

## 2022-03-18 DIAGNOSIS — Z98.891 PREVIOUS CESAREAN SECTION: Primary | ICD-10-CM

## 2022-03-18 DIAGNOSIS — R03.0 ELEVATED BLOOD PRESSURE READING: ICD-10-CM

## 2022-03-18 DIAGNOSIS — O09.523 MULTIGRAVIDA OF ADVANCED MATERNAL AGE IN THIRD TRIMESTER: ICD-10-CM

## 2022-03-18 DIAGNOSIS — O09.812 PREGNANCY RESULTING FROM ASSISTED REPRODUCTIVE TECHNOLOGY IN SECOND TRIMESTER: ICD-10-CM

## 2022-03-18 LAB
ABO GROUP BLD: NORMAL
ALP SERPL-CCNC: 152 U/L (ref 39–117)
ALT SERPL W P-5'-P-CCNC: 13 U/L (ref 1–33)
AST SERPL-CCNC: 14 U/L (ref 1–32)
BILIRUB SERPL-MCNC: 0.2 MG/DL (ref 0–1.2)
BLD GP AB SCN SERPL QL: POSITIVE
CREAT SERPL-MCNC: 0.53 MG/DL (ref 0.57–1)
DEPRECATED RDW RBC AUTO: 40.8 FL (ref 37–54)
ERYTHROCYTE [DISTWIDTH] IN BLOOD BY AUTOMATED COUNT: 13.1 % (ref 12.3–15.4)
HCT VFR BLD AUTO: 34.1 % (ref 34–46.6)
HGB BLD-MCNC: 11.7 G/DL (ref 12–15.9)
LDH SERPL-CCNC: 158 U/L (ref 135–214)
MCH RBC QN AUTO: 29.5 PG (ref 26.6–33)
MCHC RBC AUTO-ENTMCNC: 34.3 G/DL (ref 31.5–35.7)
MCV RBC AUTO: 85.9 FL (ref 79–97)
PLATELET # BLD AUTO: 248 10*3/MM3 (ref 140–450)
PMV BLD AUTO: 11.4 FL (ref 6–12)
RBC # BLD AUTO: 3.97 10*6/MM3 (ref 3.77–5.28)
RESIDUAL RHIG DETECTED: NORMAL
RH BLD: NEGATIVE
SARS-COV-2 RDRP RESP QL NAA+PROBE: NORMAL
T&S EXPIRATION DATE: NORMAL
URATE SERPL-MCNC: 6.1 MG/DL (ref 2.4–5.7)
WBC NRBC COR # BLD: 11.39 10*3/MM3 (ref 3.4–10.8)

## 2022-03-18 PROCEDURE — 83615 LACTATE (LD) (LDH) ENZYME: CPT | Performed by: OBSTETRICS & GYNECOLOGY

## 2022-03-18 PROCEDURE — 84075 ASSAY ALKALINE PHOSPHATASE: CPT | Performed by: OBSTETRICS & GYNECOLOGY

## 2022-03-18 PROCEDURE — 76820 UMBILICAL ARTERY ECHO: CPT | Performed by: OBSTETRICS & GYNECOLOGY

## 2022-03-18 PROCEDURE — 82565 ASSAY OF CREATININE: CPT | Performed by: OBSTETRICS & GYNECOLOGY

## 2022-03-18 PROCEDURE — 87635 SARS-COV-2 COVID-19 AMP PRB: CPT | Performed by: OBSTETRICS & GYNECOLOGY

## 2022-03-18 PROCEDURE — 76819 FETAL BIOPHYS PROFIL W/O NST: CPT

## 2022-03-18 PROCEDURE — 86900 BLOOD TYPING SEROLOGIC ABO: CPT | Performed by: OBSTETRICS & GYNECOLOGY

## 2022-03-18 PROCEDURE — 84460 ALANINE AMINO (ALT) (SGPT): CPT | Performed by: OBSTETRICS & GYNECOLOGY

## 2022-03-18 PROCEDURE — 76820 UMBILICAL ARTERY ECHO: CPT

## 2022-03-18 PROCEDURE — 86901 BLOOD TYPING SEROLOGIC RH(D): CPT | Performed by: OBSTETRICS & GYNECOLOGY

## 2022-03-18 PROCEDURE — 85027 COMPLETE CBC AUTOMATED: CPT | Performed by: OBSTETRICS & GYNECOLOGY

## 2022-03-18 PROCEDURE — 86850 RBC ANTIBODY SCREEN: CPT | Performed by: OBSTETRICS & GYNECOLOGY

## 2022-03-18 PROCEDURE — 59025 FETAL NON-STRESS TEST: CPT

## 2022-03-18 PROCEDURE — 82247 BILIRUBIN TOTAL: CPT | Performed by: OBSTETRICS & GYNECOLOGY

## 2022-03-18 PROCEDURE — 59025 FETAL NON-STRESS TEST: CPT | Performed by: OBSTETRICS & GYNECOLOGY

## 2022-03-18 PROCEDURE — 86870 RBC ANTIBODY IDENTIFICATION: CPT | Performed by: OBSTETRICS & GYNECOLOGY

## 2022-03-18 PROCEDURE — 99221 1ST HOSP IP/OBS SF/LOW 40: CPT | Performed by: OBSTETRICS & GYNECOLOGY

## 2022-03-18 PROCEDURE — 84550 ASSAY OF BLOOD/URIC ACID: CPT | Performed by: OBSTETRICS & GYNECOLOGY

## 2022-03-18 PROCEDURE — 76819 FETAL BIOPHYS PROFIL W/O NST: CPT | Performed by: OBSTETRICS & GYNECOLOGY

## 2022-03-18 PROCEDURE — 84450 TRANSFERASE (AST) (SGOT): CPT | Performed by: OBSTETRICS & GYNECOLOGY

## 2022-03-18 RX ORDER — CLONAZEPAM 0.5 MG/1
0.5 TABLET ORAL 2 TIMES DAILY PRN
Status: DISCONTINUED | OUTPATIENT
Start: 2022-03-18 | End: 2022-03-18

## 2022-03-18 RX ORDER — AMOXICILLIN 250 MG
1 CAPSULE ORAL 2 TIMES DAILY
Status: DISCONTINUED | OUTPATIENT
Start: 2022-03-18 | End: 2022-03-24

## 2022-03-18 RX ORDER — SODIUM CHLORIDE 0.9 % (FLUSH) 0.9 %
10 SYRINGE (ML) INJECTION EVERY 12 HOURS SCHEDULED
Status: DISCONTINUED | OUTPATIENT
Start: 2022-03-18 | End: 2022-03-24

## 2022-03-18 RX ORDER — SODIUM CHLORIDE 0.9 % (FLUSH) 0.9 %
10 SYRINGE (ML) INJECTION AS NEEDED
Status: DISCONTINUED | OUTPATIENT
Start: 2022-03-18 | End: 2022-03-24

## 2022-03-18 RX ORDER — ASPIRIN 81 MG/1
81 TABLET, CHEWABLE ORAL NIGHTLY
Status: DISCONTINUED | OUTPATIENT
Start: 2022-03-18 | End: 2022-03-24

## 2022-03-18 RX ORDER — CLONAZEPAM 0.5 MG/1
0.25 TABLET ORAL 2 TIMES DAILY PRN
Status: DISCONTINUED | OUTPATIENT
Start: 2022-03-18 | End: 2022-03-24

## 2022-03-18 RX ORDER — LIDOCAINE HYDROCHLORIDE 10 MG/ML
5 INJECTION, SOLUTION EPIDURAL; INFILTRATION; INTRACAUDAL; PERINEURAL AS NEEDED
Status: DISCONTINUED | OUTPATIENT
Start: 2022-03-18 | End: 2022-03-24

## 2022-03-18 RX ORDER — PRENATAL VIT/IRON FUM/FOLIC AC 27MG-0.8MG
1 TABLET ORAL DAILY
Status: DISCONTINUED | OUTPATIENT
Start: 2022-03-19 | End: 2022-03-24

## 2022-03-18 RX ORDER — LABETALOL 300 MG/1
300 TABLET, FILM COATED ORAL EVERY 8 HOURS SCHEDULED
Status: DISCONTINUED | OUTPATIENT
Start: 2022-03-18 | End: 2022-03-23

## 2022-03-18 RX ORDER — LAMOTRIGINE 100 MG/1
100 TABLET ORAL DAILY
Status: DISCONTINUED | OUTPATIENT
Start: 2022-03-19 | End: 2022-03-19

## 2022-03-18 RX ADMIN — LABETALOL HYDROCHLORIDE 300 MG: 300 TABLET, FILM COATED ORAL at 22:05

## 2022-03-18 RX ADMIN — OXCARBAZEPINE 450 MG: 300 TABLET, FILM COATED ORAL at 21:13

## 2022-03-18 RX ADMIN — CLONAZEPAM 0.25 MG: 0.5 TABLET ORAL at 22:06

## 2022-03-18 RX ADMIN — ASPIRIN 81 MG 81 MG: 81 TABLET ORAL at 21:12

## 2022-03-18 RX ADMIN — SENNOSIDES AND DOCUSATE SODIUM 1 TABLET: 50; 8.6 TABLET ORAL at 21:12

## 2022-03-18 RX ADMIN — LABETALOL HYDROCHLORIDE 300 MG: 300 TABLET, FILM COATED ORAL at 13:55

## 2022-03-18 NOTE — PROGRESS NOTES
Documentation of the ultasound findings, images, and interpretations will be available in the patient's Viewpoint report located in the Chart Review Imaging tab in [facility].

## 2022-03-19 PROCEDURE — 99231 SBSQ HOSP IP/OBS SF/LOW 25: CPT | Performed by: OBSTETRICS & GYNECOLOGY

## 2022-03-19 PROCEDURE — 59025 FETAL NON-STRESS TEST: CPT | Performed by: OBSTETRICS & GYNECOLOGY

## 2022-03-19 PROCEDURE — 59025 FETAL NON-STRESS TEST: CPT

## 2022-03-19 RX ADMIN — ASPIRIN 81 MG 81 MG: 81 TABLET ORAL at 21:59

## 2022-03-19 RX ADMIN — SENNOSIDES AND DOCUSATE SODIUM 1 TABLET: 50; 8.6 TABLET ORAL at 09:30

## 2022-03-19 RX ADMIN — LEVOTHYROXINE SODIUM 75 MCG: 25 TABLET ORAL at 06:09

## 2022-03-19 RX ADMIN — LABETALOL HYDROCHLORIDE 300 MG: 300 TABLET, FILM COATED ORAL at 21:59

## 2022-03-19 RX ADMIN — OXCARBAZEPINE 450 MG: 300 TABLET, FILM COATED ORAL at 21:59

## 2022-03-19 RX ADMIN — LABETALOL HYDROCHLORIDE 300 MG: 300 TABLET, FILM COATED ORAL at 14:22

## 2022-03-19 RX ADMIN — SENNOSIDES AND DOCUSATE SODIUM 1 TABLET: 50; 8.6 TABLET ORAL at 21:59

## 2022-03-19 RX ADMIN — LAMOTRIGINE 150 MG: 100 TABLET ORAL at 07:44

## 2022-03-19 RX ADMIN — LABETALOL HYDROCHLORIDE 300 MG: 300 TABLET, FILM COATED ORAL at 06:08

## 2022-03-20 LAB
ALP SERPL-CCNC: 152 U/L (ref 39–117)
ALT SERPL W P-5'-P-CCNC: 11 U/L (ref 1–33)
AST SERPL-CCNC: 14 U/L (ref 1–32)
BILIRUB SERPL-MCNC: <0.2 MG/DL (ref 0–1.2)
CREAT SERPL-MCNC: 0.61 MG/DL (ref 0.57–1)
LDH SERPL-CCNC: 159 U/L (ref 135–214)
URATE SERPL-MCNC: 5.6 MG/DL (ref 2.4–5.7)

## 2022-03-20 PROCEDURE — 59025 FETAL NON-STRESS TEST: CPT

## 2022-03-20 PROCEDURE — 83615 LACTATE (LD) (LDH) ENZYME: CPT | Performed by: OBSTETRICS & GYNECOLOGY

## 2022-03-20 PROCEDURE — 59025 FETAL NON-STRESS TEST: CPT | Performed by: OBSTETRICS & GYNECOLOGY

## 2022-03-20 PROCEDURE — 84450 TRANSFERASE (AST) (SGOT): CPT | Performed by: OBSTETRICS & GYNECOLOGY

## 2022-03-20 PROCEDURE — 84550 ASSAY OF BLOOD/URIC ACID: CPT | Performed by: OBSTETRICS & GYNECOLOGY

## 2022-03-20 PROCEDURE — 82565 ASSAY OF CREATININE: CPT | Performed by: OBSTETRICS & GYNECOLOGY

## 2022-03-20 PROCEDURE — 84460 ALANINE AMINO (ALT) (SGPT): CPT | Performed by: OBSTETRICS & GYNECOLOGY

## 2022-03-20 PROCEDURE — 82247 BILIRUBIN TOTAL: CPT | Performed by: OBSTETRICS & GYNECOLOGY

## 2022-03-20 PROCEDURE — 84075 ASSAY ALKALINE PHOSPHATASE: CPT | Performed by: OBSTETRICS & GYNECOLOGY

## 2022-03-20 PROCEDURE — 99231 SBSQ HOSP IP/OBS SF/LOW 25: CPT | Performed by: OBSTETRICS & GYNECOLOGY

## 2022-03-20 RX ADMIN — LABETALOL HYDROCHLORIDE 300 MG: 300 TABLET, FILM COATED ORAL at 14:01

## 2022-03-20 RX ADMIN — LABETALOL HYDROCHLORIDE 300 MG: 300 TABLET, FILM COATED ORAL at 21:23

## 2022-03-20 RX ADMIN — OXCARBAZEPINE 450 MG: 300 TABLET, FILM COATED ORAL at 21:23

## 2022-03-20 RX ADMIN — LABETALOL HYDROCHLORIDE 300 MG: 300 TABLET, FILM COATED ORAL at 05:35

## 2022-03-20 RX ADMIN — SENNOSIDES AND DOCUSATE SODIUM 1 TABLET: 50; 8.6 TABLET ORAL at 09:30

## 2022-03-20 RX ADMIN — SENNOSIDES AND DOCUSATE SODIUM 1 TABLET: 50; 8.6 TABLET ORAL at 21:23

## 2022-03-20 RX ADMIN — LEVOTHYROXINE SODIUM 75 MCG: 25 TABLET ORAL at 05:36

## 2022-03-20 RX ADMIN — ASPIRIN 81 MG 81 MG: 81 TABLET ORAL at 21:23

## 2022-03-20 RX ADMIN — LAMOTRIGINE 150 MG: 100 TABLET ORAL at 05:36

## 2022-03-21 ENCOUNTER — APPOINTMENT (OUTPATIENT)
Dept: WOMENS IMAGING | Facility: HOSPITAL | Age: 37
End: 2022-03-21

## 2022-03-21 PROCEDURE — 76819 FETAL BIOPHYS PROFIL W/O NST: CPT | Performed by: OBSTETRICS & GYNECOLOGY

## 2022-03-21 PROCEDURE — 76816 OB US FOLLOW-UP PER FETUS: CPT

## 2022-03-21 PROCEDURE — 99232 SBSQ HOSP IP/OBS MODERATE 35: CPT | Performed by: OBSTETRICS & GYNECOLOGY

## 2022-03-21 PROCEDURE — 59025 FETAL NON-STRESS TEST: CPT

## 2022-03-21 PROCEDURE — 59025 FETAL NON-STRESS TEST: CPT | Performed by: OBSTETRICS & GYNECOLOGY

## 2022-03-21 PROCEDURE — 76819 FETAL BIOPHYS PROFIL W/O NST: CPT

## 2022-03-21 PROCEDURE — 76820 UMBILICAL ARTERY ECHO: CPT

## 2022-03-21 PROCEDURE — 76816 OB US FOLLOW-UP PER FETUS: CPT | Performed by: OBSTETRICS & GYNECOLOGY

## 2022-03-21 PROCEDURE — 99231 SBSQ HOSP IP/OBS SF/LOW 25: CPT | Performed by: OBSTETRICS & GYNECOLOGY

## 2022-03-21 PROCEDURE — 76820 UMBILICAL ARTERY ECHO: CPT | Performed by: OBSTETRICS & GYNECOLOGY

## 2022-03-21 RX ADMIN — LABETALOL HYDROCHLORIDE 300 MG: 300 TABLET, FILM COATED ORAL at 13:42

## 2022-03-21 RX ADMIN — OXCARBAZEPINE 450 MG: 300 TABLET, FILM COATED ORAL at 21:01

## 2022-03-21 RX ADMIN — LABETALOL HYDROCHLORIDE 300 MG: 300 TABLET, FILM COATED ORAL at 06:08

## 2022-03-21 RX ADMIN — SENNOSIDES AND DOCUSATE SODIUM 1 TABLET: 50; 8.6 TABLET ORAL at 08:21

## 2022-03-21 RX ADMIN — LABETALOL HYDROCHLORIDE 300 MG: 300 TABLET, FILM COATED ORAL at 21:00

## 2022-03-21 RX ADMIN — SENNOSIDES AND DOCUSATE SODIUM 1 TABLET: 50; 8.6 TABLET ORAL at 21:00

## 2022-03-21 RX ADMIN — ASPIRIN 81 MG 81 MG: 81 TABLET ORAL at 21:00

## 2022-03-21 RX ADMIN — LAMOTRIGINE 150 MG: 100 TABLET ORAL at 06:08

## 2022-03-21 RX ADMIN — LEVOTHYROXINE SODIUM 75 MCG: 25 TABLET ORAL at 06:08

## 2022-03-22 LAB
ABO GROUP BLD: NORMAL
BLD GP AB SCN SERPL QL: POSITIVE
RESIDUAL RHIG DETECTED: NORMAL
RH BLD: NEGATIVE
T&S EXPIRATION DATE: NORMAL

## 2022-03-22 PROCEDURE — 86850 RBC ANTIBODY SCREEN: CPT | Performed by: OBSTETRICS & GYNECOLOGY

## 2022-03-22 PROCEDURE — 25010000002 BETAMETHASONE ACET & SOD PHOS PER 4 MG: Performed by: OBSTETRICS & GYNECOLOGY

## 2022-03-22 PROCEDURE — 99232 SBSQ HOSP IP/OBS MODERATE 35: CPT | Performed by: OBSTETRICS & GYNECOLOGY

## 2022-03-22 PROCEDURE — 59025 FETAL NON-STRESS TEST: CPT | Performed by: OBSTETRICS & GYNECOLOGY

## 2022-03-22 PROCEDURE — 86901 BLOOD TYPING SEROLOGIC RH(D): CPT | Performed by: OBSTETRICS & GYNECOLOGY

## 2022-03-22 PROCEDURE — 86870 RBC ANTIBODY IDENTIFICATION: CPT | Performed by: OBSTETRICS & GYNECOLOGY

## 2022-03-22 PROCEDURE — 86900 BLOOD TYPING SEROLOGIC ABO: CPT | Performed by: OBSTETRICS & GYNECOLOGY

## 2022-03-22 PROCEDURE — 59025 FETAL NON-STRESS TEST: CPT

## 2022-03-22 RX ORDER — BETAMETHASONE SODIUM PHOSPHATE AND BETAMETHASONE ACETATE 3; 3 MG/ML; MG/ML
12 INJECTION, SUSPENSION INTRA-ARTICULAR; INTRALESIONAL; INTRAMUSCULAR; SOFT TISSUE EVERY 24 HOURS
Status: DISCONTINUED | OUTPATIENT
Start: 2022-03-22 | End: 2022-03-24

## 2022-03-22 RX ADMIN — LEVOTHYROXINE SODIUM 75 MCG: 25 TABLET ORAL at 05:03

## 2022-03-22 RX ADMIN — LABETALOL HYDROCHLORIDE 300 MG: 300 TABLET, FILM COATED ORAL at 21:05

## 2022-03-22 RX ADMIN — BETAMETHASONE SODIUM PHOSPHATE AND BETAMETHASONE ACETATE 12 MG: 3; 3 INJECTION, SUSPENSION INTRA-ARTICULAR; INTRALESIONAL; INTRAMUSCULAR at 17:47

## 2022-03-22 RX ADMIN — SENNOSIDES AND DOCUSATE SODIUM 1 TABLET: 50; 8.6 TABLET ORAL at 08:42

## 2022-03-22 RX ADMIN — LAMOTRIGINE 150 MG: 100 TABLET ORAL at 05:03

## 2022-03-22 RX ADMIN — OXCARBAZEPINE 450 MG: 300 TABLET, FILM COATED ORAL at 21:05

## 2022-03-22 RX ADMIN — LABETALOL HYDROCHLORIDE 300 MG: 300 TABLET, FILM COATED ORAL at 13:55

## 2022-03-22 RX ADMIN — SENNOSIDES AND DOCUSATE SODIUM 1 TABLET: 50; 8.6 TABLET ORAL at 21:05

## 2022-03-22 RX ADMIN — ASPIRIN 81 MG 81 MG: 81 TABLET ORAL at 21:05

## 2022-03-22 RX ADMIN — LABETALOL HYDROCHLORIDE 300 MG: 300 TABLET, FILM COATED ORAL at 05:03

## 2022-03-23 ENCOUNTER — APPOINTMENT (OUTPATIENT)
Dept: WOMENS IMAGING | Facility: HOSPITAL | Age: 37
End: 2022-03-23

## 2022-03-23 LAB
ALP SERPL-CCNC: 168 U/L (ref 39–117)
ALT SERPL W P-5'-P-CCNC: 16 U/L (ref 1–33)
AST SERPL-CCNC: 15 U/L (ref 1–32)
BILIRUB SERPL-MCNC: <0.2 MG/DL (ref 0–1.2)
CREAT SERPL-MCNC: 0.64 MG/DL (ref 0.57–1)
DEPRECATED RDW RBC AUTO: 43.7 FL (ref 37–54)
ERYTHROCYTE [DISTWIDTH] IN BLOOD BY AUTOMATED COUNT: 13.2 % (ref 12.3–15.4)
HCT VFR BLD AUTO: 35.1 % (ref 34–46.6)
HGB BLD-MCNC: 11.5 G/DL (ref 12–15.9)
LDH SERPL-CCNC: 184 U/L (ref 135–214)
MCH RBC QN AUTO: 29.6 PG (ref 26.6–33)
MCHC RBC AUTO-ENTMCNC: 32.8 G/DL (ref 31.5–35.7)
MCV RBC AUTO: 90.2 FL (ref 79–97)
PLATELET # BLD AUTO: 238 10*3/MM3 (ref 140–450)
PMV BLD AUTO: 11.8 FL (ref 6–12)
RBC # BLD AUTO: 3.89 10*6/MM3 (ref 3.77–5.28)
URATE SERPL-MCNC: 5.4 MG/DL (ref 2.4–5.7)
WBC NRBC COR # BLD: 13.86 10*3/MM3 (ref 3.4–10.8)

## 2022-03-23 PROCEDURE — 99232 SBSQ HOSP IP/OBS MODERATE 35: CPT | Performed by: OBSTETRICS & GYNECOLOGY

## 2022-03-23 PROCEDURE — 76819 FETAL BIOPHYS PROFIL W/O NST: CPT

## 2022-03-23 PROCEDURE — 83615 LACTATE (LD) (LDH) ENZYME: CPT | Performed by: OBSTETRICS & GYNECOLOGY

## 2022-03-23 PROCEDURE — 76820 UMBILICAL ARTERY ECHO: CPT

## 2022-03-23 PROCEDURE — 82247 BILIRUBIN TOTAL: CPT | Performed by: OBSTETRICS & GYNECOLOGY

## 2022-03-23 PROCEDURE — 59025 FETAL NON-STRESS TEST: CPT | Performed by: OBSTETRICS & GYNECOLOGY

## 2022-03-23 PROCEDURE — 84460 ALANINE AMINO (ALT) (SGPT): CPT | Performed by: OBSTETRICS & GYNECOLOGY

## 2022-03-23 PROCEDURE — 84075 ASSAY ALKALINE PHOSPHATASE: CPT | Performed by: OBSTETRICS & GYNECOLOGY

## 2022-03-23 PROCEDURE — 84450 TRANSFERASE (AST) (SGOT): CPT | Performed by: OBSTETRICS & GYNECOLOGY

## 2022-03-23 PROCEDURE — 59025 FETAL NON-STRESS TEST: CPT

## 2022-03-23 PROCEDURE — 84550 ASSAY OF BLOOD/URIC ACID: CPT | Performed by: OBSTETRICS & GYNECOLOGY

## 2022-03-23 PROCEDURE — 76820 UMBILICAL ARTERY ECHO: CPT | Performed by: OBSTETRICS & GYNECOLOGY

## 2022-03-23 PROCEDURE — 76819 FETAL BIOPHYS PROFIL W/O NST: CPT | Performed by: OBSTETRICS & GYNECOLOGY

## 2022-03-23 PROCEDURE — 85027 COMPLETE CBC AUTOMATED: CPT | Performed by: OBSTETRICS & GYNECOLOGY

## 2022-03-23 PROCEDURE — 82565 ASSAY OF CREATININE: CPT | Performed by: OBSTETRICS & GYNECOLOGY

## 2022-03-23 RX ORDER — MAGNESIUM SULFATE HEPTAHYDRATE 40 MG/ML
2 INJECTION, SOLUTION INTRAVENOUS CONTINUOUS
Status: DISCONTINUED | OUTPATIENT
Start: 2022-03-23 | End: 2022-03-25

## 2022-03-23 RX ORDER — DEXTROSE AND SODIUM CHLORIDE 5; .2 G/100ML; G/100ML
75 INJECTION, SOLUTION INTRAVENOUS CONTINUOUS
Status: DISCONTINUED | OUTPATIENT
Start: 2022-03-23 | End: 2022-03-24

## 2022-03-23 RX ORDER — NIFEDIPINE 10 MG/1
10 CAPSULE ORAL ONCE
Status: COMPLETED | OUTPATIENT
Start: 2022-03-23 | End: 2022-03-23

## 2022-03-23 RX ORDER — LABETALOL 200 MG/1
400 TABLET, FILM COATED ORAL EVERY 8 HOURS SCHEDULED
Status: DISCONTINUED | OUTPATIENT
Start: 2022-03-23 | End: 2022-03-24

## 2022-03-23 RX ADMIN — SENNOSIDES AND DOCUSATE SODIUM 1 TABLET: 50; 8.6 TABLET ORAL at 08:58

## 2022-03-23 RX ADMIN — LABETALOL HYDROCHLORIDE 300 MG: 300 TABLET, FILM COATED ORAL at 06:04

## 2022-03-23 RX ADMIN — LEVOTHYROXINE SODIUM 75 MCG: 25 TABLET ORAL at 06:04

## 2022-03-23 RX ADMIN — OXCARBAZEPINE 450 MG: 300 TABLET, FILM COATED ORAL at 22:12

## 2022-03-23 RX ADMIN — ASPIRIN 81 MG 81 MG: 81 TABLET ORAL at 22:01

## 2022-03-23 RX ADMIN — LABETALOL HYDROCHLORIDE 400 MG: 200 TABLET, FILM COATED ORAL at 21:28

## 2022-03-23 RX ADMIN — SENNOSIDES AND DOCUSATE SODIUM 1 TABLET: 50; 8.6 TABLET ORAL at 22:01

## 2022-03-23 RX ADMIN — LAMOTRIGINE 150 MG: 100 TABLET ORAL at 06:04

## 2022-03-23 RX ADMIN — NIFEDIPINE 10 MG: 10 CAPSULE ORAL at 19:59

## 2022-03-23 RX ADMIN — DEXTROSE AND SODIUM CHLORIDE 75 ML/HR: 5; 200 INJECTION, SOLUTION INTRAVENOUS at 22:59

## 2022-03-24 ENCOUNTER — ANESTHESIA EVENT (OUTPATIENT)
Dept: LABOR AND DELIVERY | Facility: HOSPITAL | Age: 37
End: 2022-03-24

## 2022-03-24 ENCOUNTER — ANESTHESIA (OUTPATIENT)
Dept: LABOR AND DELIVERY | Facility: HOSPITAL | Age: 37
End: 2022-03-24

## 2022-03-24 LAB
ATMOSPHERIC PRESS: ABNORMAL MM[HG]
ATMOSPHERIC PRESS: ABNORMAL MM[HG]
BASE EXCESS BLDCOA CALC-SCNC: -1.5 MMOL/L (ref 0–2)
BASE EXCESS BLDCOV CALC-SCNC: -1.7 MMOL/L (ref 0–2)
BDY SITE: ABNORMAL
BDY SITE: ABNORMAL
BODY TEMPERATURE: 37 C
BODY TEMPERATURE: 37 C
CO2 BLDA-SCNC: 26.6 MMOL/L (ref 22–33)
CO2 BLDA-SCNC: 28.5 MMOL/L (ref 22–33)
COLLECT TME SMN: ABNORMAL
EPAP: 0
EPAP: 0
HCO3 BLDCOA-SCNC: 26.8 MMOL/L (ref 16.9–20.5)
HCO3 BLDCOV-SCNC: 25.1 MMOL/L (ref 18.6–21.4)
HGB BLDA-MCNC: 18.5 G/DL (ref 14–18)
HGB BLDA-MCNC: 18.6 G/DL (ref 14–18)
INHALED O2 CONCENTRATION: 21 %
INHALED O2 CONCENTRATION: 21 %
IPAP: 0
IPAP: 0
MODALITY: ABNORMAL
MODALITY: ABNORMAL
NOTE: ABNORMAL
NOTE: ABNORMAL
PAW @ PEAK INSP FLOW SETTING VENT: 0 CMH2O
PAW @ PEAK INSP FLOW SETTING VENT: 0 CMH2O
PCO2 BLDCOA: 56.5 MMHG (ref 43.3–54.9)
PCO2 BLDCOV: 48.4 MM HG (ref 28–40)
PH BLDCOA: 7.28 PH UNITS (ref 7.22–7.3)
PH BLDCOV: 7.32 PH UNITS (ref 7.31–7.37)
PO2 BLDCOA: 12.6 MMHG (ref 11.5–43.3)
PO2 BLDCOV: 17.2 MM HG (ref 21–31)
SAO2 % BLDCOA: 18.6 %
SAO2 % BLDCOA: ABNORMAL %
SAO2 % BLDCOV: 32.5 %
TOTAL RATE: 0 BREATHS/MINUTE
TOTAL RATE: 0 BREATHS/MINUTE
VENTILATOR MODE: ABNORMAL

## 2022-03-24 PROCEDURE — 88307 TISSUE EXAM BY PATHOLOGIST: CPT | Performed by: OBSTETRICS & GYNECOLOGY

## 2022-03-24 PROCEDURE — 25010000002 ONDANSETRON PER 1 MG: Performed by: NURSE ANESTHETIST, CERTIFIED REGISTERED

## 2022-03-24 PROCEDURE — 0 MAGNESIUM SULFATE 20 GM/500ML SOLUTION: Performed by: OBSTETRICS & GYNECOLOGY

## 2022-03-24 PROCEDURE — 25010000002 KETOROLAC TROMETHAMINE PER 15 MG: Performed by: OBSTETRICS & GYNECOLOGY

## 2022-03-24 PROCEDURE — 25010000002 CEFAZOLIN IN DEXTROSE 2-4 GM/100ML-% SOLUTION

## 2022-03-24 PROCEDURE — 25010000002 METOCLOPRAMIDE PER 10 MG: Performed by: NURSE ANESTHETIST, CERTIFIED REGISTERED

## 2022-03-24 PROCEDURE — 59510 CESAREAN DELIVERY: CPT | Performed by: OBSTETRICS & GYNECOLOGY

## 2022-03-24 PROCEDURE — 25010000002 FENTANYL CITRATE (PF) 50 MCG/ML SOLUTION: Performed by: NURSE ANESTHETIST, CERTIFIED REGISTERED

## 2022-03-24 PROCEDURE — 25010000002 MIDAZOLAM PER 1 MG: Performed by: NURSE ANESTHETIST, CERTIFIED REGISTERED

## 2022-03-24 PROCEDURE — 82805 BLOOD GASES W/O2 SATURATION: CPT

## 2022-03-24 PROCEDURE — 59025 FETAL NON-STRESS TEST: CPT

## 2022-03-24 PROCEDURE — 25010000002 ONDANSETRON PER 1 MG: Performed by: OBSTETRICS & GYNECOLOGY

## 2022-03-24 PROCEDURE — 0 MORPHINE PER 10 MG: Performed by: NURSE ANESTHETIST, CERTIFIED REGISTERED

## 2022-03-24 RX ORDER — TRISODIUM CITRATE DIHYDRATE AND CITRIC ACID MONOHYDRATE 500; 334 MG/5ML; MG/5ML
30 SOLUTION ORAL ONCE
Status: DISCONTINUED | OUTPATIENT
Start: 2022-03-24 | End: 2022-03-27 | Stop reason: HOSPADM

## 2022-03-24 RX ORDER — CARBOPROST TROMETHAMINE 250 UG/ML
250 INJECTION, SOLUTION INTRAMUSCULAR
Status: DISCONTINUED | OUTPATIENT
Start: 2022-03-24 | End: 2022-03-27 | Stop reason: HOSPADM

## 2022-03-24 RX ORDER — SODIUM CHLORIDE 0.9 % (FLUSH) 0.9 %
10 SYRINGE (ML) INJECTION EVERY 12 HOURS SCHEDULED
Status: DISCONTINUED | OUTPATIENT
Start: 2022-03-24 | End: 2022-03-27 | Stop reason: HOSPADM

## 2022-03-24 RX ORDER — ACETAMINOPHEN 325 MG/1
650 TABLET ORAL EVERY 6 HOURS
Status: DISCONTINUED | OUTPATIENT
Start: 2022-03-25 | End: 2022-03-27 | Stop reason: HOSPADM

## 2022-03-24 RX ORDER — ACETAMINOPHEN 500 MG
1000 TABLET ORAL EVERY 6 HOURS
Status: COMPLETED | OUTPATIENT
Start: 2022-03-24 | End: 2022-03-25

## 2022-03-24 RX ORDER — SCOLOPAMINE TRANSDERMAL SYSTEM 1 MG/1
1 PATCH, EXTENDED RELEASE TRANSDERMAL
Status: DISCONTINUED | OUTPATIENT
Start: 2022-03-24 | End: 2022-03-24

## 2022-03-24 RX ORDER — METHYLERGONOVINE MALEATE 0.2 MG/ML
200 INJECTION INTRAVENOUS AS NEEDED
Status: DISCONTINUED | OUTPATIENT
Start: 2022-03-24 | End: 2022-03-27 | Stop reason: HOSPADM

## 2022-03-24 RX ORDER — TRISODIUM CITRATE DIHYDRATE AND CITRIC ACID MONOHYDRATE 500; 334 MG/5ML; MG/5ML
30 SOLUTION ORAL ONCE
Status: COMPLETED | OUTPATIENT
Start: 2022-03-24 | End: 2022-03-24

## 2022-03-24 RX ORDER — OXYTOCIN/0.9 % SODIUM CHLORIDE 30/500 ML
PLASTIC BAG, INJECTION (ML) INTRAVENOUS AS NEEDED
Status: DISCONTINUED | OUTPATIENT
Start: 2022-03-24 | End: 2022-03-24 | Stop reason: SURG

## 2022-03-24 RX ORDER — CALCIUM CARBONATE 200(500)MG
1 TABLET,CHEWABLE ORAL EVERY 4 HOURS PRN
Status: DISCONTINUED | OUTPATIENT
Start: 2022-03-24 | End: 2022-03-27 | Stop reason: HOSPADM

## 2022-03-24 RX ORDER — ONDANSETRON 2 MG/ML
4 INJECTION INTRAMUSCULAR; INTRAVENOUS ONCE AS NEEDED
Status: ACTIVE | OUTPATIENT
Start: 2022-03-24 | End: 2022-03-25

## 2022-03-24 RX ORDER — LIDOCAINE HYDROCHLORIDE 10 MG/ML
5 INJECTION, SOLUTION EPIDURAL; INFILTRATION; INTRACAUDAL; PERINEURAL AS NEEDED
Status: DISCONTINUED | OUTPATIENT
Start: 2022-03-24 | End: 2022-03-27 | Stop reason: HOSPADM

## 2022-03-24 RX ORDER — IBUPROFEN 600 MG/1
600 TABLET ORAL EVERY 6 HOURS
Status: DISCONTINUED | OUTPATIENT
Start: 2022-03-25 | End: 2022-03-27 | Stop reason: HOSPADM

## 2022-03-24 RX ORDER — KETOROLAC TROMETHAMINE 15 MG/ML
15 INJECTION, SOLUTION INTRAMUSCULAR; INTRAVENOUS EVERY 6 HOURS
Status: COMPLETED | OUTPATIENT
Start: 2022-03-24 | End: 2022-03-25

## 2022-03-24 RX ORDER — SIMETHICONE 80 MG
80 TABLET,CHEWABLE ORAL 4 TIMES DAILY PRN
Status: DISCONTINUED | OUTPATIENT
Start: 2022-03-24 | End: 2022-03-27 | Stop reason: HOSPADM

## 2022-03-24 RX ORDER — NALBUPHINE HCL 10 MG/ML
2.5 AMPUL (ML) INJECTION EVERY 4 HOURS PRN
Status: ACTIVE | OUTPATIENT
Start: 2022-03-24 | End: 2022-03-25

## 2022-03-24 RX ORDER — OXYTOCIN/0.9 % SODIUM CHLORIDE 30/500 ML
85 PLASTIC BAG, INJECTION (ML) INTRAVENOUS ONCE
Status: DISCONTINUED | OUTPATIENT
Start: 2022-03-24 | End: 2022-03-27 | Stop reason: HOSPADM

## 2022-03-24 RX ORDER — MORPHINE SULFATE 0.5 MG/ML
INJECTION, SOLUTION EPIDURAL; INTRATHECAL; INTRAVENOUS AS NEEDED
Status: DISCONTINUED | OUTPATIENT
Start: 2022-03-24 | End: 2022-03-24 | Stop reason: SURG

## 2022-03-24 RX ORDER — ONDANSETRON 2 MG/ML
INJECTION INTRAMUSCULAR; INTRAVENOUS AS NEEDED
Status: DISCONTINUED | OUTPATIENT
Start: 2022-03-24 | End: 2022-03-24 | Stop reason: SURG

## 2022-03-24 RX ORDER — KETOROLAC TROMETHAMINE 30 MG/ML
30 INJECTION, SOLUTION INTRAMUSCULAR; INTRAVENOUS ONCE
Status: COMPLETED | OUTPATIENT
Start: 2022-03-24 | End: 2022-03-24

## 2022-03-24 RX ORDER — HYDROMORPHONE HYDROCHLORIDE 1 MG/ML
0.5 INJECTION, SOLUTION INTRAMUSCULAR; INTRAVENOUS; SUBCUTANEOUS
Status: DISCONTINUED | OUTPATIENT
Start: 2022-03-24 | End: 2022-03-25

## 2022-03-24 RX ORDER — FAMOTIDINE 10 MG/ML
INJECTION, SOLUTION INTRAVENOUS AS NEEDED
Status: DISCONTINUED | OUTPATIENT
Start: 2022-03-24 | End: 2022-03-24 | Stop reason: SURG

## 2022-03-24 RX ORDER — SODIUM CHLORIDE, SODIUM LACTATE, POTASSIUM CHLORIDE, CALCIUM CHLORIDE 600; 310; 30; 20 MG/100ML; MG/100ML; MG/100ML; MG/100ML
4000 INJECTION, SOLUTION INTRAVENOUS CONTINUOUS
Status: DISCONTINUED | OUTPATIENT
Start: 2022-03-24 | End: 2022-03-24

## 2022-03-24 RX ORDER — ACETAMINOPHEN 500 MG
TABLET ORAL
Status: COMPLETED
Start: 2022-03-24 | End: 2022-03-24

## 2022-03-24 RX ORDER — MIDAZOLAM HYDROCHLORIDE 1 MG/ML
INJECTION INTRAMUSCULAR; INTRAVENOUS AS NEEDED
Status: DISCONTINUED | OUTPATIENT
Start: 2022-03-24 | End: 2022-03-24

## 2022-03-24 RX ORDER — ONDANSETRON 2 MG/ML
4 INJECTION INTRAMUSCULAR; INTRAVENOUS EVERY 6 HOURS PRN
Status: DISCONTINUED | OUTPATIENT
Start: 2022-03-24 | End: 2022-03-24

## 2022-03-24 RX ORDER — METOCLOPRAMIDE HYDROCHLORIDE 5 MG/ML
INJECTION INTRAMUSCULAR; INTRAVENOUS AS NEEDED
Status: DISCONTINUED | OUTPATIENT
Start: 2022-03-24 | End: 2022-03-24 | Stop reason: SURG

## 2022-03-24 RX ORDER — PRENATAL VIT/IRON FUM/FOLIC AC 27MG-0.8MG
1 TABLET ORAL DAILY
Status: DISCONTINUED | OUTPATIENT
Start: 2022-03-24 | End: 2022-03-27 | Stop reason: HOSPADM

## 2022-03-24 RX ORDER — CARBOPROST TROMETHAMINE 250 UG/ML
250 INJECTION, SOLUTION INTRAMUSCULAR AS NEEDED
Status: DISCONTINUED | OUTPATIENT
Start: 2022-03-24 | End: 2022-03-27 | Stop reason: HOSPADM

## 2022-03-24 RX ORDER — METHYLERGONOVINE MALEATE 0.2 MG/ML
200 INJECTION INTRAVENOUS ONCE AS NEEDED
Status: DISCONTINUED | OUTPATIENT
Start: 2022-03-24 | End: 2022-03-27 | Stop reason: HOSPADM

## 2022-03-24 RX ORDER — MISOPROSTOL 200 UG/1
800 TABLET ORAL ONCE AS NEEDED
Status: DISCONTINUED | OUTPATIENT
Start: 2022-03-24 | End: 2022-03-27 | Stop reason: HOSPADM

## 2022-03-24 RX ORDER — BUPIVACAINE HYDROCHLORIDE 7.5 MG/ML
INJECTION, SOLUTION INTRASPINAL AS NEEDED
Status: DISCONTINUED | OUTPATIENT
Start: 2022-03-24 | End: 2022-03-24 | Stop reason: SURG

## 2022-03-24 RX ORDER — ALUMINA, MAGNESIA, AND SIMETHICONE 2400; 2400; 240 MG/30ML; MG/30ML; MG/30ML
15 SUSPENSION ORAL EVERY 4 HOURS PRN
Status: DISCONTINUED | OUTPATIENT
Start: 2022-03-24 | End: 2022-03-27 | Stop reason: HOSPADM

## 2022-03-24 RX ORDER — SODIUM CHLORIDE 0.9 % (FLUSH) 0.9 %
10 SYRINGE (ML) INJECTION AS NEEDED
Status: DISCONTINUED | OUTPATIENT
Start: 2022-03-24 | End: 2022-03-27 | Stop reason: HOSPADM

## 2022-03-24 RX ORDER — CEFAZOLIN SODIUM 2 G/100ML
INJECTION, SOLUTION INTRAVENOUS
Status: COMPLETED
Start: 2022-03-24 | End: 2022-03-24

## 2022-03-24 RX ORDER — BUPIVACAINE HCL/0.9 % NACL/PF 0.125 %
PLASTIC BAG, INJECTION (ML) EPIDURAL AS NEEDED
Status: DISCONTINUED | OUTPATIENT
Start: 2022-03-24 | End: 2022-03-24 | Stop reason: SURG

## 2022-03-24 RX ORDER — ACETAMINOPHEN 500 MG
1000 TABLET ORAL ONCE
Status: COMPLETED | OUTPATIENT
Start: 2022-03-24 | End: 2022-03-24

## 2022-03-24 RX ORDER — HYDROCORTISONE 25 MG/G
1 CREAM TOPICAL AS NEEDED
Status: DISCONTINUED | OUTPATIENT
Start: 2022-03-24 | End: 2022-03-27 | Stop reason: HOSPADM

## 2022-03-24 RX ORDER — MIDAZOLAM HYDROCHLORIDE 1 MG/ML
INJECTION INTRAMUSCULAR; INTRAVENOUS AS NEEDED
Status: DISCONTINUED | OUTPATIENT
Start: 2022-03-24 | End: 2022-03-24 | Stop reason: SURG

## 2022-03-24 RX ORDER — CEFAZOLIN SODIUM 2 G/100ML
2 INJECTION, SOLUTION INTRAVENOUS ONCE
Status: COMPLETED | OUTPATIENT
Start: 2022-03-24 | End: 2022-03-24

## 2022-03-24 RX ORDER — OXYCODONE HYDROCHLORIDE 5 MG/1
10 TABLET ORAL EVERY 4 HOURS PRN
Status: DISCONTINUED | OUTPATIENT
Start: 2022-03-24 | End: 2022-03-27 | Stop reason: HOSPADM

## 2022-03-24 RX ORDER — FENTANYL CITRATE 50 UG/ML
INJECTION, SOLUTION INTRAMUSCULAR; INTRAVENOUS AS NEEDED
Status: DISCONTINUED | OUTPATIENT
Start: 2022-03-24 | End: 2022-03-24 | Stop reason: SURG

## 2022-03-24 RX ORDER — SODIUM CHLORIDE, SODIUM LACTATE, POTASSIUM CHLORIDE, CALCIUM CHLORIDE 600; 310; 30; 20 MG/100ML; MG/100ML; MG/100ML; MG/100ML
125 INJECTION, SOLUTION INTRAVENOUS CONTINUOUS
Status: DISCONTINUED | OUTPATIENT
Start: 2022-03-24 | End: 2022-03-27 | Stop reason: HOSPADM

## 2022-03-24 RX ORDER — DOCUSATE SODIUM 100 MG/1
100 CAPSULE, LIQUID FILLED ORAL 2 TIMES DAILY PRN
Status: DISCONTINUED | OUTPATIENT
Start: 2022-03-24 | End: 2022-03-27 | Stop reason: HOSPADM

## 2022-03-24 RX ORDER — MISOPROSTOL 200 UG/1
600 TABLET ORAL AS NEEDED
Status: DISCONTINUED | OUTPATIENT
Start: 2022-03-24 | End: 2022-03-27 | Stop reason: HOSPADM

## 2022-03-24 RX ORDER — LABETALOL 200 MG/1
200 TABLET, FILM COATED ORAL EVERY 8 HOURS SCHEDULED
Status: DISCONTINUED | OUTPATIENT
Start: 2022-03-25 | End: 2022-03-27 | Stop reason: HOSPADM

## 2022-03-24 RX ORDER — LABETALOL HYDROCHLORIDE 5 MG/ML
20-80 INJECTION, SOLUTION INTRAVENOUS
Status: DISCONTINUED | OUTPATIENT
Start: 2022-03-24 | End: 2022-03-24

## 2022-03-24 RX ORDER — OXYTOCIN/0.9 % SODIUM CHLORIDE 30/500 ML
650 PLASTIC BAG, INJECTION (ML) INTRAVENOUS ONCE
Status: DISCONTINUED | OUTPATIENT
Start: 2022-03-24 | End: 2022-03-27 | Stop reason: HOSPADM

## 2022-03-24 RX ORDER — OXYCODONE HYDROCHLORIDE 5 MG/1
5 TABLET ORAL EVERY 4 HOURS PRN
Status: DISCONTINUED | OUTPATIENT
Start: 2022-03-24 | End: 2022-03-27 | Stop reason: HOSPADM

## 2022-03-24 RX ADMIN — KETOROLAC TROMETHAMINE 30 MG: 30 INJECTION, SOLUTION INTRAMUSCULAR at 11:19

## 2022-03-24 RX ADMIN — ACETAMINOPHEN 1000 MG: 500 TABLET ORAL at 07:42

## 2022-03-24 RX ADMIN — MAGNESIUM SULFATE IN WATER 2 G/HR: 40 INJECTION, SOLUTION INTRAVENOUS at 18:22

## 2022-03-24 RX ADMIN — CEFAZOLIN SODIUM 2 G: 2 INJECTION, SOLUTION INTRAVENOUS at 07:42

## 2022-03-24 RX ADMIN — Medication 1000 MG: at 07:42

## 2022-03-24 RX ADMIN — CLONAZEPAM 0.25 MG: 0.5 TABLET ORAL at 03:46

## 2022-03-24 RX ADMIN — MORPHINE SULFATE 150 MCG: 0.5 INJECTION, SOLUTION EPIDURAL; INTRATHECAL; INTRAVENOUS at 08:46

## 2022-03-24 RX ADMIN — LEVOTHYROXINE SODIUM 75 MCG: 25 TABLET ORAL at 05:35

## 2022-03-24 RX ADMIN — SODIUM CHLORIDE, POTASSIUM CHLORIDE, SODIUM LACTATE AND CALCIUM CHLORIDE 4000 ML: 600; 310; 30; 20 INJECTION, SOLUTION INTRAVENOUS at 08:04

## 2022-03-24 RX ADMIN — SODIUM CITRATE AND CITRIC ACID MONOHYDRATE 30 ML: 500; 334 SOLUTION ORAL at 07:45

## 2022-03-24 RX ADMIN — KETOROLAC TROMETHAMINE 15 MG: 15 INJECTION, SOLUTION INTRAMUSCULAR; INTRAVENOUS at 17:35

## 2022-03-24 RX ADMIN — MIDAZOLAM 1 MG: 1 INJECTION INTRAMUSCULAR; INTRAVENOUS at 09:02

## 2022-03-24 RX ADMIN — LAMOTRIGINE 150 MG: 100 TABLET ORAL at 05:35

## 2022-03-24 RX ADMIN — ONDANSETRON 4 MG: 2 INJECTION INTRAMUSCULAR; INTRAVENOUS at 08:41

## 2022-03-24 RX ADMIN — LABETALOL HYDROCHLORIDE 20 MG: 5 INJECTION, SOLUTION INTRAVENOUS at 04:54

## 2022-03-24 RX ADMIN — LABETALOL HYDROCHLORIDE 200 MG: 200 TABLET, FILM COATED ORAL at 21:38

## 2022-03-24 RX ADMIN — Medication 100 MCG: at 08:49

## 2022-03-24 RX ADMIN — LABETALOL HYDROCHLORIDE 400 MG: 200 TABLET, FILM COATED ORAL at 13:34

## 2022-03-24 RX ADMIN — SCOPALAMINE 1 PATCH: 1 PATCH, EXTENDED RELEASE TRANSDERMAL at 08:01

## 2022-03-24 RX ADMIN — ONDANSETRON 4 MG: 2 INJECTION INTRAMUSCULAR; INTRAVENOUS at 06:53

## 2022-03-24 RX ADMIN — LABETALOL HYDROCHLORIDE 400 MG: 200 TABLET, FILM COATED ORAL at 05:35

## 2022-03-24 RX ADMIN — Medication 500 ML: at 09:17

## 2022-03-24 RX ADMIN — ACETAMINOPHEN 1000 MG: 500 TABLET ORAL at 20:30

## 2022-03-24 RX ADMIN — SODIUM CHLORIDE, POTASSIUM CHLORIDE, SODIUM LACTATE AND CALCIUM CHLORIDE 75 ML/HR: 600; 310; 30; 20 INJECTION, SOLUTION INTRAVENOUS at 13:36

## 2022-03-24 RX ADMIN — FAMOTIDINE 20 MG: 10 INJECTION, SOLUTION INTRAVENOUS at 08:41

## 2022-03-24 RX ADMIN — MAGNESIUM SULFATE IN WATER 2 G/HR: 40 INJECTION, SOLUTION INTRAVENOUS at 05:34

## 2022-03-24 RX ADMIN — METOCLOPRAMIDE 10 MG: 5 INJECTION, SOLUTION INTRAMUSCULAR; INTRAVENOUS at 08:57

## 2022-03-24 RX ADMIN — ACETAMINOPHEN 1000 MG: 500 TABLET ORAL at 13:33

## 2022-03-24 RX ADMIN — DEXTROSE AND SODIUM CHLORIDE 75 ML/HR: 5; 200 INJECTION, SOLUTION INTRAVENOUS at 05:34

## 2022-03-24 RX ADMIN — OXCARBAZEPINE 450 MG: 300 TABLET, FILM COATED ORAL at 21:36

## 2022-03-24 RX ADMIN — FENTANYL CITRATE 20 MCG: 50 INJECTION, SOLUTION INTRAMUSCULAR; INTRAVENOUS at 08:46

## 2022-03-24 RX ADMIN — BUPIVACAINE HYDROCHLORIDE IN DEXTROSE 1.6 ML: 7.5 INJECTION, SOLUTION SUBARACHNOID at 08:46

## 2022-03-24 RX ADMIN — KETOROLAC TROMETHAMINE 15 MG: 15 INJECTION, SOLUTION INTRAMUSCULAR; INTRAVENOUS at 23:39

## 2022-03-24 NOTE — ANESTHESIA POSTPROCEDURE EVALUATION
Patient: Bianca Mercado    Procedure Summary     Date: 22 Room / Location: [facility] LABOR DELIVERY   [facility] LABOR DELIVERY    Anesthesia Start: 838 Anesthesia Stop: 931    Procedure:  SECTION REPEAT (N/A Abdomen) Diagnosis:     Surgeons: Monique Dasilva MD Provider: Bianca Betancourt DO    Anesthesia Type: spinal, ITN ASA Status: 3          Anesthesia Type: spinal, ITN    Vitals  Vitals Value Taken Time   /67 2227   Temp 97.6    Pulse 76 22   Resp 17    SpO2 97 % 22   Vitals shown include unvalidated device data.        Post Anesthesia Care and Evaluation    Patient location during evaluation: bedside  Patient participation: complete - patient participated  Level of consciousness: awake and alert  Pain management: adequate  Airway patency: patent  Anesthetic complications: No anesthetic complications    Cardiovascular status: acceptable  Respiratory status: acceptable  Hydration status: acceptable

## 2022-03-24 NOTE — ANESTHESIA PREPROCEDURE EVALUATION
Anesthesia Evaluation     Patient summary reviewed and Nursing notes reviewed   NPO Solid Status: > 6 hours  NPO Liquid Status: > 2 hours           Airway   Mallampati: II  TM distance: >3 FB  Neck ROM: full  Dental      Pulmonary - negative pulmonary ROS   Cardiovascular     (+) hypertension,       Neuro/Psych  (+) psychiatric history Anxiety and Depression,    GI/Hepatic/Renal/Endo    (+)   thyroid problem (goiter) hypothyroidism    Musculoskeletal (-) negative ROS    Abdominal    Substance History - negative use     OB/GYN    (+) Pregnant,         Other - negative ROS                       Anesthesia Plan    ASA 3     spinal and ITN       Anesthetic plan, all risks, benefits, and alternatives have been provided, discussed and informed consent has been obtained with: patient.  Use of blood products discussed with patient .   Plan discussed with attending.        CODE STATUS:    Code Status (Patient has no pulse and is not breathing): CPR (Attempt to Resuscitate)  Medical Interventions (Patient has pulse or is breathing): Full Support

## 2022-03-24 NOTE — ANESTHESIA PROCEDURE NOTES
Spinal Block      Patient reassessed immediately prior to procedure    Patient location during procedure: OR  Indication:at surgeon's request  Performed By  CRNA: Nani Gallo CRNA  Preanesthetic Checklist  Completed: patient identified, IV checked, risks and benefits discussed, surgical consent, monitors and equipment checked, pre-op evaluation and timeout performed  Spinal Block Prep:  Patient Position:sitting  Sterile Tech:cap, gloves, mask and sterile barriers  Prep:Betadine  Patient Monitoring:blood pressure monitoring, continuous pulse oximetry and EKG  Spinal Block Procedure  Approach:midline  Guidance:palpation technique  Location:L3-L4  Needle Type:Russel  Needle Gauge:25 G  Placement of Spinal needle event:cerebrospinal fluid aspirated  Paresthesia: no  Fluid Appearance:clear     Post Assessment  Patient Tolerance:patient tolerated the procedure well with no apparent complications  Complications no

## 2022-03-25 ENCOUNTER — APPOINTMENT (OUTPATIENT)
Dept: WOMENS IMAGING | Facility: HOSPITAL | Age: 37
End: 2022-03-25

## 2022-03-25 LAB
ABO GROUP BLD: NORMAL
BASOPHILS # BLD AUTO: 0.01 10*3/MM3 (ref 0–0.2)
BASOPHILS NFR BLD AUTO: 0.1 % (ref 0–1.5)
DEPRECATED RDW RBC AUTO: 44.6 FL (ref 37–54)
EOSINOPHIL # BLD AUTO: 0.17 10*3/MM3 (ref 0–0.4)
EOSINOPHIL NFR BLD AUTO: 1.6 % (ref 0.3–6.2)
ERYTHROCYTE [DISTWIDTH] IN BLOOD BY AUTOMATED COUNT: 13.8 % (ref 12.3–15.4)
FETAL BLEED: NEGATIVE
HCT VFR BLD AUTO: 32 % (ref 34–46.6)
HGB BLD-MCNC: 10.6 G/DL (ref 12–15.9)
IMM GRANULOCYTES # BLD AUTO: 0.06 10*3/MM3 (ref 0–0.05)
IMM GRANULOCYTES NFR BLD AUTO: 0.6 % (ref 0–0.5)
LYMPHOCYTES # BLD AUTO: 1.21 10*3/MM3 (ref 0.7–3.1)
LYMPHOCYTES NFR BLD AUTO: 11.5 % (ref 19.6–45.3)
MCH RBC QN AUTO: 29.4 PG (ref 26.6–33)
MCHC RBC AUTO-ENTMCNC: 33.1 G/DL (ref 31.5–35.7)
MCV RBC AUTO: 88.6 FL (ref 79–97)
MONOCYTES # BLD AUTO: 0.6 10*3/MM3 (ref 0.1–0.9)
MONOCYTES NFR BLD AUTO: 5.7 % (ref 5–12)
NEUTROPHILS NFR BLD AUTO: 8.5 10*3/MM3 (ref 1.7–7)
NEUTROPHILS NFR BLD AUTO: 80.5 % (ref 42.7–76)
NRBC BLD AUTO-RTO: 0 /100 WBC (ref 0–0.2)
NUMBER OF DOSES: NORMAL
PLATELET # BLD AUTO: 236 10*3/MM3 (ref 140–450)
PMV BLD AUTO: 10.7 FL (ref 6–12)
RBC # BLD AUTO: 3.61 10*6/MM3 (ref 3.77–5.28)
RH BLD: NEGATIVE
WBC NRBC COR # BLD: 10.55 10*3/MM3 (ref 3.4–10.8)

## 2022-03-25 PROCEDURE — 99231 SBSQ HOSP IP/OBS SF/LOW 25: CPT | Performed by: OBSTETRICS & GYNECOLOGY

## 2022-03-25 PROCEDURE — 85025 COMPLETE CBC W/AUTO DIFF WBC: CPT | Performed by: OBSTETRICS & GYNECOLOGY

## 2022-03-25 PROCEDURE — 85461 HEMOGLOBIN FETAL: CPT | Performed by: OBSTETRICS & GYNECOLOGY

## 2022-03-25 PROCEDURE — 25010000002 RHO D IMMUNE GLOBULIN 1500 UNIT/2ML SOLUTION PREFILLED SYRINGE: Performed by: OBSTETRICS & GYNECOLOGY

## 2022-03-25 PROCEDURE — 86900 BLOOD TYPING SEROLOGIC ABO: CPT | Performed by: OBSTETRICS & GYNECOLOGY

## 2022-03-25 PROCEDURE — 86901 BLOOD TYPING SEROLOGIC RH(D): CPT | Performed by: OBSTETRICS & GYNECOLOGY

## 2022-03-25 PROCEDURE — 0 MAGNESIUM SULFATE 20 GM/500ML SOLUTION: Performed by: OBSTETRICS & GYNECOLOGY

## 2022-03-25 PROCEDURE — 25010000002 KETOROLAC TROMETHAMINE PER 15 MG: Performed by: OBSTETRICS & GYNECOLOGY

## 2022-03-25 RX ADMIN — ACETAMINOPHEN 1000 MG: 500 TABLET ORAL at 02:30

## 2022-03-25 RX ADMIN — LAMOTRIGINE 150 MG: 100 TABLET ORAL at 05:29

## 2022-03-25 RX ADMIN — Medication 1 APPLICATION: at 23:02

## 2022-03-25 RX ADMIN — IBUPROFEN 600 MG: 600 TABLET ORAL at 17:30

## 2022-03-25 RX ADMIN — IBUPROFEN 600 MG: 600 TABLET ORAL at 23:02

## 2022-03-25 RX ADMIN — ACETAMINOPHEN 1000 MG: 500 TABLET ORAL at 08:37

## 2022-03-25 RX ADMIN — LABETALOL HYDROCHLORIDE 200 MG: 200 TABLET, FILM COATED ORAL at 13:00

## 2022-03-25 RX ADMIN — DOCUSATE SODIUM 100 MG: 100 CAPSULE, LIQUID FILLED ORAL at 22:06

## 2022-03-25 RX ADMIN — SODIUM CHLORIDE, POTASSIUM CHLORIDE, SODIUM LACTATE AND CALCIUM CHLORIDE 125 ML/HR: 600; 310; 30; 20 INJECTION, SOLUTION INTRAVENOUS at 04:30

## 2022-03-25 RX ADMIN — MAGNESIUM SULFATE IN WATER 2 G/HR: 40 INJECTION, SOLUTION INTRAVENOUS at 06:16

## 2022-03-25 RX ADMIN — OXYCODONE 5 MG: 5 TABLET ORAL at 22:05

## 2022-03-25 RX ADMIN — LEVOTHYROXINE SODIUM 75 MCG: 25 TABLET ORAL at 05:29

## 2022-03-25 RX ADMIN — ACETAMINOPHEN 650 MG: 325 TABLET ORAL at 13:00

## 2022-03-25 RX ADMIN — HUMAN RHO(D) IMMUNE GLOBULIN 1500 UNITS: 1500 SOLUTION INTRAMUSCULAR; INTRAVENOUS at 16:00

## 2022-03-25 RX ADMIN — Medication 10 ML: at 20:41

## 2022-03-25 RX ADMIN — OXCARBAZEPINE 450 MG: 300 TABLET, FILM COATED ORAL at 20:41

## 2022-03-25 RX ADMIN — KETOROLAC TROMETHAMINE 15 MG: 15 INJECTION, SOLUTION INTRAMUSCULAR; INTRAVENOUS at 11:18

## 2022-03-25 RX ADMIN — KETOROLAC TROMETHAMINE 15 MG: 15 INJECTION, SOLUTION INTRAMUSCULAR; INTRAVENOUS at 04:30

## 2022-03-25 RX ADMIN — ACETAMINOPHEN 650 MG: 325 TABLET ORAL at 19:46

## 2022-03-25 RX ADMIN — LABETALOL HYDROCHLORIDE 200 MG: 200 TABLET, FILM COATED ORAL at 22:06

## 2022-03-25 RX ADMIN — LABETALOL HYDROCHLORIDE 200 MG: 200 TABLET, FILM COATED ORAL at 05:29

## 2022-03-25 NOTE — ANESTHESIA POSTPROCEDURE EVALUATION
Patient: Bianca Mercado    Procedure Summary     Date: 22 Room / Location: [facility] LABOR DELIVERY   [facility] LABOR DELIVERY    Anesthesia Start: 838 Anesthesia Stop: 931    Procedure:  SECTION REPEAT (N/A Abdomen) Diagnosis:     Surgeons: Monique Dasilva MD Provider: Bianca Betancourt DO    Anesthesia Type: spinal, ITN ASA Status: 3          Anesthesia Type: spinal, ITN    Vitals  Vitals Value Taken Time   /70 22 0952   Temp 97.6 °F (36.4 °C) 22 0833   Pulse 81 22 0952   Resp 18 22 0833   SpO2 98 % 22 193   Vitals shown include unvalidated device data.        Post Anesthesia Care and Evaluation    Patient location during evaluation: bedside  Patient participation: complete - patient participated  Level of consciousness: awake and alert  Pain management: adequate  Airway patency: patent  Anesthetic complications: No anesthetic complications    Cardiovascular status: acceptable  Respiratory status: acceptable  Hydration status: acceptable  Post Neuraxial Block status: Motor and sensory function returned to baseline and No signs or symptoms of PDPH

## 2022-03-26 PROCEDURE — 99231 SBSQ HOSP IP/OBS SF/LOW 25: CPT | Performed by: STUDENT IN AN ORGANIZED HEALTH CARE EDUCATION/TRAINING PROGRAM

## 2022-03-26 RX ADMIN — ACETAMINOPHEN 650 MG: 325 TABLET ORAL at 02:03

## 2022-03-26 RX ADMIN — DOCUSATE SODIUM 100 MG: 100 CAPSULE, LIQUID FILLED ORAL at 20:19

## 2022-03-26 RX ADMIN — OXYCODONE 5 MG: 5 TABLET ORAL at 06:07

## 2022-03-26 RX ADMIN — LEVOTHYROXINE SODIUM 75 MCG: 25 TABLET ORAL at 05:34

## 2022-03-26 RX ADMIN — LABETALOL HYDROCHLORIDE 200 MG: 200 TABLET, FILM COATED ORAL at 14:15

## 2022-03-26 RX ADMIN — ACETAMINOPHEN 650 MG: 325 TABLET ORAL at 20:19

## 2022-03-26 RX ADMIN — SIMETHICONE 80 MG: 80 TABLET, CHEWABLE ORAL at 20:19

## 2022-03-26 RX ADMIN — ACETAMINOPHEN 650 MG: 325 TABLET ORAL at 14:15

## 2022-03-26 RX ADMIN — IBUPROFEN 600 MG: 600 TABLET ORAL at 23:10

## 2022-03-26 RX ADMIN — LABETALOL HYDROCHLORIDE 200 MG: 200 TABLET, FILM COATED ORAL at 21:59

## 2022-03-26 RX ADMIN — OXCARBAZEPINE 450 MG: 300 TABLET, FILM COATED ORAL at 20:19

## 2022-03-26 RX ADMIN — DOCUSATE SODIUM 100 MG: 100 CAPSULE, LIQUID FILLED ORAL at 08:55

## 2022-03-26 RX ADMIN — IBUPROFEN 600 MG: 600 TABLET ORAL at 05:34

## 2022-03-26 RX ADMIN — LABETALOL HYDROCHLORIDE 200 MG: 200 TABLET, FILM COATED ORAL at 05:34

## 2022-03-26 RX ADMIN — SIMETHICONE 80 MG: 80 TABLET, CHEWABLE ORAL at 08:55

## 2022-03-26 RX ADMIN — OXYCODONE 5 MG: 5 TABLET ORAL at 14:47

## 2022-03-26 RX ADMIN — ACETAMINOPHEN 650 MG: 325 TABLET ORAL at 08:55

## 2022-03-26 RX ADMIN — IBUPROFEN 600 MG: 600 TABLET ORAL at 16:51

## 2022-03-26 RX ADMIN — OXYCODONE 5 MG: 5 TABLET ORAL at 02:03

## 2022-03-26 RX ADMIN — OXYCODONE 5 MG: 5 TABLET ORAL at 23:10

## 2022-03-26 RX ADMIN — IBUPROFEN 600 MG: 600 TABLET ORAL at 12:10

## 2022-03-26 RX ADMIN — LAMOTRIGINE 150 MG: 100 TABLET ORAL at 05:34

## 2022-03-26 RX ADMIN — SIMETHICONE 80 MG: 80 TABLET, CHEWABLE ORAL at 12:50

## 2022-03-27 VITALS
OXYGEN SATURATION: 98 % | TEMPERATURE: 98.4 F | BODY MASS INDEX: 31.64 KG/M2 | RESPIRATION RATE: 16 BRPM | HEART RATE: 92 BPM | WEIGHT: 196 LBS | DIASTOLIC BLOOD PRESSURE: 67 MMHG | SYSTOLIC BLOOD PRESSURE: 138 MMHG

## 2022-03-27 PROCEDURE — 99238 HOSP IP/OBS DSCHRG MGMT 30/<: CPT | Performed by: STUDENT IN AN ORGANIZED HEALTH CARE EDUCATION/TRAINING PROGRAM

## 2022-03-27 RX ORDER — OXYCODONE HYDROCHLORIDE 5 MG/1
5 TABLET ORAL EVERY 4 HOURS PRN
Qty: 18 TABLET | Refills: 0 | Status: SHIPPED | OUTPATIENT
Start: 2022-03-27 | End: 2022-03-31

## 2022-03-27 RX ORDER — PSEUDOEPHEDRINE HCL 30 MG
100 TABLET ORAL 2 TIMES DAILY PRN
Qty: 60 CAPSULE | Refills: 2 | Status: SHIPPED | OUTPATIENT
Start: 2022-03-27

## 2022-03-27 RX ORDER — IBUPROFEN 600 MG/1
600 TABLET ORAL EVERY 6 HOURS
Qty: 60 TABLET | Refills: 2 | Status: SHIPPED | OUTPATIENT
Start: 2022-03-27 | End: 2022-04-07

## 2022-03-27 RX ADMIN — IBUPROFEN 600 MG: 600 TABLET ORAL at 05:32

## 2022-03-27 RX ADMIN — ACETAMINOPHEN 650 MG: 325 TABLET ORAL at 10:21

## 2022-03-27 RX ADMIN — LAMOTRIGINE 150 MG: 100 TABLET ORAL at 05:32

## 2022-03-27 RX ADMIN — IBUPROFEN 600 MG: 600 TABLET ORAL at 12:39

## 2022-03-27 RX ADMIN — LABETALOL HYDROCHLORIDE 200 MG: 200 TABLET, FILM COATED ORAL at 05:31

## 2022-03-27 RX ADMIN — ACETAMINOPHEN 650 MG: 325 TABLET ORAL at 02:13

## 2022-03-27 RX ADMIN — LEVOTHYROXINE SODIUM 75 MCG: 25 TABLET ORAL at 05:32

## 2022-03-27 RX ADMIN — OXYCODONE 5 MG: 5 TABLET ORAL at 12:39

## 2022-03-27 RX ADMIN — LABETALOL HYDROCHLORIDE 200 MG: 200 TABLET, FILM COATED ORAL at 13:19

## 2022-03-27 RX ADMIN — SIMETHICONE 80 MG: 80 TABLET, CHEWABLE ORAL at 10:22

## 2022-03-27 RX ADMIN — ACETAMINOPHEN 650 MG: 325 TABLET ORAL at 13:19

## 2022-03-28 LAB
CYTO UR: NORMAL
LAB AP CASE REPORT: NORMAL
PATH REPORT.FINAL DX SPEC: NORMAL
PATH REPORT.GROSS SPEC: NORMAL

## 2022-04-01 ENCOUNTER — APPOINTMENT (OUTPATIENT)
Dept: WOMENS IMAGING | Facility: HOSPITAL | Age: 37
End: 2022-04-01

## 2022-04-07 ENCOUNTER — POSTPARTUM VISIT (OUTPATIENT)
Dept: OBSTETRICS AND GYNECOLOGY | Facility: CLINIC | Age: 37
End: 2022-04-07

## 2022-04-07 VITALS
HEIGHT: 66 IN | DIASTOLIC BLOOD PRESSURE: 62 MMHG | SYSTOLIC BLOOD PRESSURE: 122 MMHG | WEIGHT: 187 LBS | BODY MASS INDEX: 30.05 KG/M2

## 2022-04-07 PROCEDURE — 0503F POSTPARTUM CARE VISIT: CPT | Performed by: NURSE PRACTITIONER

## 2022-04-07 NOTE — PROGRESS NOTES
"Chief Complaint   Patient presents with   • Postpartum Care     2 Week Visit       2 Week Postpartum Visit         Bianca Mercado is a 37 y.o.  s/p , due to Previous  at 32 weeks 1 day on 2022, who presents today for a 2 week postpartum check.      Patient reports her incision is clean, dry, intact. Patient denies any redness or drainage. Patient stated that she is having a shooting pain on left side of her incision. Patient stated that the pain is improving. Patient denies postpartum depression.  Patient describes bleeding as light. Patient stated that she stopped for a week but has recently started spotting. Patient stated that the spotting is maroon mucus color. Patient is breast and bottle feeding.  Desires contraceptive methods: None for contraception.  Patient denies bowel or bladder issues.    Postpartum Depression Screening Questionnaire: 2022, No treatment indicated.  Baby Name: Marty  Baby weight: 3 pounds 12 ounces  Baby To NICU for 14 days, remains in NICU      The additional following portions of the patient's history were reviewed and updated as appropriate: allergies and current medications.      Review of Systems   Constitutional: Negative.    Respiratory: Negative.    Cardiovascular: Negative.    Gastrointestinal: Negative.    Genitourinary: Negative.    Psychiatric/Behavioral: Negative.        I have reviewed and agree with the HPI, ROS, and historical information as entered above. Yesy Card, APRN    Objective   /62   Ht 167.6 cm (66\")   Wt 84.8 kg (187 lb)   LMP  (LMP Unknown)   Breastfeeding Yes   BMI 30.18 kg/m²     Physical Exam  Constitutional:       Appearance: Normal appearance.   Pulmonary:      Effort: Pulmonary effort is normal.   Abdominal:      Palpations: Abdomen is soft.      Comments: Incision well healed   Neurological:      Mental Status: She is alert.              Assessment and Plan    Problem List Items Addressed This " Visit    None     Visit Diagnoses     Encounter for postpartum visit    -  Primary        BP well controlled on current regimen     1. S/p , 2 weeks postpartum.  Doing well.    2. Continued pelvic rest with a return to driving and light physical activity.  3. Contraception: contraceptive methods: None  Return in about 4 weeks (around 2022).   Baby improving in NICU    Yesy Card, APRN  2022

## 2022-04-19 ENCOUNTER — TELEPHONE (OUTPATIENT)
Dept: OBSTETRICS AND GYNECOLOGY | Facility: CLINIC | Age: 37
End: 2022-04-19

## 2022-04-19 NOTE — TELEPHONE ENCOUNTER
S/w pt to let her know that Dr. Dasilva would be doing circumcision either tomorrow or Thursday . She v/u.

## 2022-05-09 ENCOUNTER — POSTPARTUM VISIT (OUTPATIENT)
Dept: OBSTETRICS AND GYNECOLOGY | Facility: CLINIC | Age: 37
End: 2022-05-09

## 2022-05-09 VITALS
SYSTOLIC BLOOD PRESSURE: 120 MMHG | BODY MASS INDEX: 28.91 KG/M2 | WEIGHT: 179.9 LBS | DIASTOLIC BLOOD PRESSURE: 70 MMHG | HEIGHT: 66 IN

## 2022-05-09 DIAGNOSIS — Z01.419 PAP TEST, AS PART OF ROUTINE GYNECOLOGICAL EXAMINATION: ICD-10-CM

## 2022-05-09 DIAGNOSIS — R35.0 URINARY FREQUENCY: ICD-10-CM

## 2022-05-09 DIAGNOSIS — R39.15 URINARY URGENCY: ICD-10-CM

## 2022-05-09 LAB
BILIRUB BLD-MCNC: NEGATIVE MG/DL
CLARITY, POC: CLEAR
COLOR UR: YELLOW
EXPIRATION DATE: 1
GLUCOSE UR STRIP-MCNC: NEGATIVE MG/DL
KETONES UR QL: NEGATIVE
LEUKOCYTE EST, POC: NEGATIVE
Lab: 1
NITRITE UR-MCNC: NEGATIVE MG/ML
PH UR: 6.5 [PH] (ref 5–8)
PROT UR STRIP-MCNC: ABNORMAL MG/DL
RBC # UR STRIP: ABNORMAL /UL
UROBILINOGEN UR QL: NORMAL

## 2022-05-09 PROCEDURE — 0503F POSTPARTUM CARE VISIT: CPT

## 2022-05-09 NOTE — PROGRESS NOTES
"Chief Complaint   Patient presents with   • Postpartum Care     6 wk visit       6 Week Postpartum Visit         Bianca Mercado is a 37 y.o.  s/p , due to Previous  at 32 weeks 1 day on 2022, who presents today for a 6 week postpartum check.      At the time of delivery were you diagnosed with any of the following: None. The patient did not have a laceration or episiotomy  is healing well.    Patient denies postpartum depression.  Patient describes bleeding as absent.  Patient is bottle feeding.  Desires contraceptive methods: None for contraception.  Patient reports bladder issues. Patient stated that she is experiencing urgency and frequency. Denies: hematuria & dysuria    Postpartum Depression Screening Questionnaire: score 1, 2022, no treatment indicated.  Baby Name: Marty  Baby Weight: 3 pounds 12 ounces  Baby Discharged: To NICU for 27 days  Delivering Physician: Brown Garrett Completed Pap Smear     This patient has no relevant Health Maintenance data.        Is the patient due for a pap today? Yes.  Performed Today    The additional following portions of the patient's history were reviewed and updated as appropriate: allergies and current medications.    Review of Systems   Constitutional: Negative.    Respiratory: Negative.    Cardiovascular: Negative.    Gastrointestinal: Negative.    Genitourinary: Positive for frequency and urgency. Negative for dysuria and hematuria.     All other systems reviewed and are negative.     I have reviewed and agree with the HPI, ROS, and historical information as entered above. Fanta Pardo, APRN    /70   Ht 167.6 cm (66\")   Wt 81.6 kg (179 lb 14.4 oz)   BMI 29.04 kg/m²     Physical Exam  Vitals and nursing note reviewed. Exam conducted with a chaperone present.   Constitutional:       Appearance: Normal appearance.   HENT:      Head: Normocephalic and atraumatic.   Pulmonary:      Effort: Pulmonary effort is normal. "   Abdominal:      Comments: LTCS incision well approximated; no drainage, bleeding, or redness.    Genitourinary:     General: Normal vulva.   Neurological:      Mental Status: She is alert.   Psychiatric:         Mood and Affect: Mood normal.         Behavior: Behavior normal.             Assessment and Plan      Problem List Items Addressed This Visit    None     Visit Diagnoses     Postpartum follow-up    -  Primary    Relevant Orders    LIQUID-BASED PAP SMEAR, [facility] ([facility])    Urinary frequency        Relevant Orders    POC Urinalysis Dipstick, Automated (Completed)    Urinalysis With Culture If Indicated - Urine, Clean Catch    Urinary urgency        Relevant Orders    POC Urinalysis Dipstick, Automated (Completed)    Urinalysis With Culture If Indicated - Urine, Clean Catch          1. S/p , 6 weeks postpartum.  Doing well.    2. Return to normal physical activity.  No pelvic restrictions.   3. Contraception: contraceptive methods: Abstinence- would not like to start birth control at this time.  4. Urine culture sent  5. Advised increase water intake, and decrease carbonated/caffinated beverages.   6. Pap today  7. Return in about 1 year (around 2023) for Annual physical or PRN.     Fanta Pardo, APRN  2022

## 2022-05-10 LAB
APPEARANCE UR: CLEAR
BACTERIA #/AREA URNS HPF: NORMAL /HPF
BILIRUB UR QL STRIP: NEGATIVE
CASTS URNS QL MICRO: NORMAL /LPF
COLOR UR: YELLOW
EPI CELLS #/AREA URNS HPF: NORMAL /HPF (ref 0–10)
GLUCOSE UR QL STRIP: NEGATIVE
HGB UR QL STRIP: NEGATIVE
KETONES UR QL STRIP: NEGATIVE
LEUKOCYTE ESTERASE UR QL STRIP: NEGATIVE
MICRO URNS: ABNORMAL
NITRITE UR QL STRIP: NEGATIVE
PH UR STRIP: 6.5 [PH] (ref 5–7.5)
PROT UR QL STRIP: ABNORMAL
RBC #/AREA URNS HPF: NORMAL /HPF (ref 0–2)
SP GR UR STRIP: 1.01 (ref 1–1.03)
URINALYSIS REFLEX: ABNORMAL
UROBILINOGEN UR STRIP-MCNC: 0.2 MG/DL (ref 0.2–1)
WBC #/AREA URNS HPF: NORMAL /HPF (ref 0–5)

## 2022-05-11 LAB — REF LAB TEST METHOD: NORMAL

## 2022-07-26 ENCOUNTER — OFFICE VISIT (OUTPATIENT)
Dept: ENDOCRINOLOGY | Facility: CLINIC | Age: 37
End: 2022-07-26

## 2022-07-26 ENCOUNTER — LAB (OUTPATIENT)
Dept: LAB | Facility: HOSPITAL | Age: 37
End: 2022-07-26

## 2022-07-26 VITALS
DIASTOLIC BLOOD PRESSURE: 80 MMHG | SYSTOLIC BLOOD PRESSURE: 122 MMHG | OXYGEN SATURATION: 98 % | HEART RATE: 92 BPM | HEIGHT: 66 IN | WEIGHT: 169 LBS | BODY MASS INDEX: 27.16 KG/M2

## 2022-07-26 DIAGNOSIS — E03.9 ACQUIRED HYPOTHYROIDISM: ICD-10-CM

## 2022-07-26 DIAGNOSIS — E03.9 ACQUIRED HYPOTHYROIDISM: Primary | ICD-10-CM

## 2022-07-26 LAB — TSH SERPL DL<=0.05 MIU/L-ACNC: 0.16 UIU/ML (ref 0.27–4.2)

## 2022-07-26 PROCEDURE — 84443 ASSAY THYROID STIM HORMONE: CPT

## 2022-07-26 PROCEDURE — 99213 OFFICE O/P EST LOW 20 MIN: CPT | Performed by: INTERNAL MEDICINE

## 2022-07-26 RX ORDER — LABETALOL 200 MG/1
200 TABLET, FILM COATED ORAL 3 TIMES DAILY
COMMUNITY
Start: 2022-06-13 | End: 2022-07-26 | Stop reason: SDUPTHER

## 2022-07-26 NOTE — PROGRESS NOTES
"     Office Note      Date: 2022  Patient Name: Bianca Mercado  MRN: [id number]  : 1985    Chief Complaint   Patient presents with   • Hypothyroidism       History of Present Illness:   Bianca Mercado is a 37 y.o. female who presents for Hypothyroidism  she is on T4 at 75 mcg per day  She is 4 months post partum  ========================  She has no new issues or problems  Labs 8 months ago were fine     Subjective          Review of Systems:   Review of Systems   Constitutional: Negative for fatigue and unexpected weight change.   Cardiovascular: Negative for palpitations.   Endocrine: Negative for cold intolerance and heat intolerance.   Skin: Negative.    Neurological: Negative for tremors.   Psychiatric/Behavioral: Negative for dysphoric mood. The patient is not nervous/anxious.        The following portions of the patient's history were reviewed and updated as appropriate: allergies, current medications, past family history, past medical history, past social history, past surgical history and problem list.    Objective     Visit Vitals  /80   Pulse 92   Ht 167.6 cm (66\")   Wt 76.7 kg (169 lb)   SpO2 98%   BMI 27.28 kg/m²       Labs:    CBC w/DIFF  Lab Results   Component Value Date    WBC 10.55 2022    RBC 3.61 (L) 2022    HGB 10.6 (L) 2022    HCT 32.0 (L) 2022    MCV 88.6 2022    MCH 29.4 2022    MCHC 33.1 2022    RDW 13.8 2022    RDWSD 44.6 2022    MPV 10.7 2022     2022    NEUTRORELPCT 80.5 (H) 2022    LYMPHORELPCT 11.5 (L) 2022    MONORELPCT 5.7 2022    EOSRELPCT 1.6 2022    BASORELPCT 0.1 2022    AUTOIGPER 0.6 (H) 2022    NEUTROABS 8.50 (H) 2022    LYMPHSABS 1.21 2022    MONOSABS 0.60 2022    EOSABS 0.17 2022    BASOSABS 0.01 2022    AUTOIGNUM 0.06 (H) 2022    NRBC 0.0 2022       T4  No results found for: FREET4    TSH  No " results found for: TSHBASE     Physical Exam:  Physical Exam  Vitals reviewed.   HENT:      Head: Atraumatic.   Eyes:      Extraocular Movements: Extraocular movements intact.   Neck:      Comments: Stable goiter  Lymphadenopathy:      Cervical: No cervical adenopathy.   Neurological:      Mental Status: She is alert.   Psychiatric:         Thought Content: Thought content normal.         Judgment: Judgment normal.         Assessment / Plan      Assessment & Plan:  Problem List Items Addressed This Visit        Other    Acquired hypothyroidism - Primary    Current Assessment & Plan     Stable  Clinically euthyroid. Thyroid levels ordered. Medication to be adjusted accordingly.           Relevant Medications    levothyroxine (Synthroid) 75 MCG tablet    Other Relevant Orders    TSH           Mark Walker MD   07/26/2022

## 2022-11-08 DIAGNOSIS — E03.9 ACQUIRED HYPOTHYROIDISM: ICD-10-CM

## 2022-11-08 RX ORDER — LEVOTHYROXINE SODIUM 0.07 MG/1
75 TABLET ORAL DAILY
Qty: 90 TABLET | Refills: 2 | Status: SHIPPED | OUTPATIENT
Start: 2022-11-08 | End: 2023-11-08

## 2023-05-31 DIAGNOSIS — E03.9 ACQUIRED HYPOTHYROIDISM: ICD-10-CM

## 2023-05-31 RX ORDER — LEVOTHYROXINE SODIUM 0.07 MG/1
75 TABLET ORAL DAILY
Qty: 90 TABLET | Refills: 0 | Status: SHIPPED | OUTPATIENT
Start: 2023-05-31 | End: 2024-05-30

## 2023-07-26 ENCOUNTER — OFFICE VISIT (OUTPATIENT)
Dept: ENDOCRINOLOGY | Facility: CLINIC | Age: 38
End: 2023-07-26
Payer: COMMERCIAL

## 2023-07-26 VITALS
BODY MASS INDEX: 28.77 KG/M2 | DIASTOLIC BLOOD PRESSURE: 70 MMHG | WEIGHT: 179 LBS | HEIGHT: 66 IN | HEART RATE: 79 BPM | SYSTOLIC BLOOD PRESSURE: 130 MMHG | OXYGEN SATURATION: 98 %

## 2023-07-26 DIAGNOSIS — E03.9 ACQUIRED HYPOTHYROIDISM: Primary | ICD-10-CM

## 2023-07-26 LAB — TSH SERPL DL<=0.05 MIU/L-ACNC: 6.3 UIU/ML (ref 0.27–4.2)

## 2023-07-26 PROCEDURE — 84443 ASSAY THYROID STIM HORMONE: CPT | Performed by: INTERNAL MEDICINE

## 2023-07-26 NOTE — PROGRESS NOTES
"     Office Note      Date: 2023  Patient Name: Bianca Mercado  MRN: [id number]  : 1985    Chief Complaint   Patient presents with    Hypothyroidism       History of Present Illness:   Bianca Mercado is a 38 y.o. female who presents for Hypothyroidism  .   Current rx: t4- 88 mcg per day     Changes in history:had high cholesterol for first time   Questions /problems:none     Subjective          Review of Systems:   Review of Systems   Constitutional:  Positive for fatigue and unexpected weight change. Negative for diaphoresis.   HENT:  Negative for trouble swallowing and voice change.    Eyes:  Negative for visual disturbance.   Cardiovascular:  Negative for palpitations.   Endocrine: Negative for cold intolerance and heat intolerance.   Skin:         Dry skin   Neurological:  Negative for tremors.   Psychiatric/Behavioral:  Positive for decreased concentration and sleep disturbance. Negative for confusion. The patient is nervous/anxious.      The following portions of the patient's history were reviewed and updated as appropriate: allergies, current medications, past family history, past medical history, past social history, past surgical history, and problem list.    Objective     Visit Vitals  /70   Pulse 79   Ht 167.6 cm (66\")   Wt 81.2 kg (179 lb)   SpO2 98%   Breastfeeding No   BMI 28.89 kg/m²           Physical Exam:  Physical Exam  Vitals reviewed.   Constitutional:       General: She is not in acute distress.     Appearance: Normal appearance. She is not ill-appearing, toxic-appearing or diaphoretic.   HENT:      Head: Normocephalic and atraumatic.   Eyes:      Extraocular Movements: Extraocular movements intact.   Neck:      Comments: No palpable thyroid abnormality   Cardiovascular:      Rate and Rhythm: Normal rate.   Pulmonary:      Effort: Pulmonary effort is normal.   Lymphadenopathy:      Cervical: No cervical adenopathy.   Neurological:      Mental Status: She is alert. "   Psychiatric:         Mood and Affect: Mood normal.         Behavior: Behavior normal.         Thought Content: Thought content normal.         Judgment: Judgment normal.       Assessment / Plan      Assessment & Plan:  Problem List Items Addressed This Visit          Other    Acquired hypothyroidism - Primary    Current Assessment & Plan     Clinically euthyroid. Thyroid levels ordered. Medication to be adjusted accordingly.          Relevant Medications    levothyroxine (Synthroid) 88 MCG tablet    Other Relevant Orders    TSH        Mark Walker MD   07/26/2023

## 2023-08-01 ENCOUNTER — TELEPHONE (OUTPATIENT)
Dept: ENDOCRINOLOGY | Facility: CLINIC | Age: 38
End: 2023-08-01

## 2023-08-01 RX ORDER — LEVOTHYROXINE SODIUM 0.1 MG/1
100 TABLET ORAL DAILY
Qty: 30 TABLET | Refills: 11 | Status: SHIPPED | OUTPATIENT
Start: 2023-08-01 | End: 2024-07-31

## 2023-08-01 NOTE — TELEPHONE ENCOUNTER
Pt would like to discuss TSH lab results.  She is in training today and is only available to take calls between 11:30 and 12:30.

## 2023-12-14 DIAGNOSIS — E03.9 ACQUIRED HYPOTHYROIDISM: Primary | ICD-10-CM

## 2024-07-29 RX ORDER — LEVOTHYROXINE SODIUM 0.1 MG/1
100 TABLET ORAL DAILY
Qty: 60 TABLET | Refills: 0 | Status: SHIPPED | OUTPATIENT
Start: 2024-07-29

## 2024-09-16 ENCOUNTER — TRANSCRIBE ORDERS (OUTPATIENT)
Dept: ADMINISTRATIVE | Facility: HOSPITAL | Age: 39
End: 2024-09-16
Payer: COMMERCIAL

## 2024-09-16 DIAGNOSIS — Z12.31 VISIT FOR SCREENING MAMMOGRAM: Primary | ICD-10-CM

## 2024-09-25 RX ORDER — LEVOTHYROXINE SODIUM 100 UG/1
100 TABLET ORAL DAILY
Qty: 60 TABLET | Refills: 0 | Status: SHIPPED | OUTPATIENT
Start: 2024-09-25

## 2024-10-09 ENCOUNTER — OFFICE VISIT (OUTPATIENT)
Age: 39
End: 2024-10-09
Payer: COMMERCIAL

## 2024-10-09 ENCOUNTER — HOSPITAL ENCOUNTER (OUTPATIENT)
Facility: HOSPITAL | Age: 39
Discharge: HOME | End: 2024-10-09
Admitting: NURSE PRACTITIONER
Payer: COMMERCIAL

## 2024-10-09 VITALS
HEIGHT: 66 IN | HEART RATE: 71 BPM | WEIGHT: 181 LBS | SYSTOLIC BLOOD PRESSURE: 138 MMHG | BODY MASS INDEX: 29.09 KG/M2 | OXYGEN SATURATION: 99 % | DIASTOLIC BLOOD PRESSURE: 78 MMHG

## 2024-10-09 DIAGNOSIS — E04.0 SIMPLE GOITER: ICD-10-CM

## 2024-10-09 DIAGNOSIS — E03.9 ACQUIRED HYPOTHYROIDISM: Primary | ICD-10-CM

## 2024-10-09 DIAGNOSIS — Z12.31 VISIT FOR SCREENING MAMMOGRAM: ICD-10-CM

## 2024-10-09 LAB
NCCN CRITERIA FLAG: ABNORMAL
TYRER CUZICK SCORE: 26.3

## 2024-10-09 PROCEDURE — 84443 ASSAY THYROID STIM HORMONE: CPT | Performed by: INTERNAL MEDICINE

## 2024-10-09 PROCEDURE — 77067 SCR MAMMO BI INCL CAD: CPT

## 2024-10-09 PROCEDURE — 77063 BREAST TOMOSYNTHESIS BI: CPT

## 2024-10-09 RX ORDER — LABETALOL 100 MG/1
50 TABLET, FILM COATED ORAL DAILY
COMMUNITY

## 2024-10-09 NOTE — PROGRESS NOTES
"     Office Note      Date: 10/09/2024  Patient Name: Bianca Mercado  MRN: [id number]  : 1985    Chief Complaint   Patient presents with    Hypothyroidism       History of Present Illness:   Bianca Mercado is a 39 y.o. female who presents for Hypothyroidism  .   Current rx: t4- 100 mcg per day     Changes in history: None   Questions /problems: none     Subjective          Review of Systems:   Review of Systems   Constitutional:  Negative for fatigue and unexpected weight change.   HENT:  Negative for trouble swallowing and voice change.    Cardiovascular:  Negative for palpitations.   Gastrointestinal:  Negative for nausea.   Endocrine: Negative for cold intolerance and heat intolerance.   Psychiatric/Behavioral:  Negative for sleep disturbance.        The following portions of the patient's history were reviewed and updated as appropriate: allergies, current medications, past family history, past medical history, past social history, past surgical history, and problem list.    Objective     Visit Vitals  /78 (BP Location: Right arm, Patient Position: Sitting, Cuff Size: Adult)   Pulse 71   Ht 167.6 cm (66\")   Wt 82.1 kg (181 lb)   SpO2 99%   BMI 29.21 kg/m²           Physical Exam:  Physical Exam  Vitals reviewed.   Constitutional:       Appearance: Normal appearance. She is normal weight. She is obese.   Neck:      Comments: Smooth diffuse goiter   Lymphadenopathy:      Cervical: No cervical adenopathy.   Neurological:      Mental Status: She is alert.   Psychiatric:         Mood and Affect: Mood normal.         Behavior: Behavior normal.         Thought Content: Thought content normal.         Judgment: Judgment normal.         Assessment / Plan      Assessment & Plan:  Problem List Items Addressed This Visit       Acquired hypothyroidism - Primary    Current Assessment & Plan     Clinically euthyroid. Thyroid levels ordered. Medication to be adjusted accordingly.          Relevant " Medications    levothyroxine (SYNTHROID, LEVOTHROID) 100 MCG tablet    labetalol (NORMODYNE) 100 MG tablet    Other Relevant Orders    TSH    Simple goiter    Current Assessment & Plan     Stable on exam. No changes are needed          Relevant Medications    levothyroxine (SYNTHROID, LEVOTHROID) 100 MCG tablet    labetalol (NORMODYNE) 100 MG tablet        Electronically signed by : Mark Walker MD   10/09/2024

## 2024-10-10 LAB — TSH SERPL DL<=0.05 MIU/L-ACNC: 1.81 UIU/ML (ref 0.27–4.2)

## 2024-10-10 RX ORDER — LEVOTHYROXINE SODIUM 100 UG/1
100 TABLET ORAL DAILY
Qty: 90 TABLET | Refills: 3 | Status: SHIPPED | OUTPATIENT
Start: 2024-10-10

## 2024-10-14 NOTE — PROGRESS NOTES
This patient recently took the CARE risk assessment as part of their mammogram appointment. Based on the responses, the patient meets NCCN criteria for genetic testing and the Tyrer-Cuzick breast cancer risk score is > 20%.     Navigator follow-up:      The patient would like to proceed with genetic testing.  I will submit an order for approval from her provider and coordinate care. I spoke with the patient regarding her Tyrer-Cuzick result of >20% and the option for referral to the high-risk cancer clinic.  At this time, the patient has declined a referral to the clinic.

## 2024-10-15 DIAGNOSIS — Z13.79 GENETIC TESTING: Primary | ICD-10-CM

## (undated) DEVICE — TRY SPINE BLCK WHITACRE 25G 3X5IN

## (undated) DEVICE — SUT VIC 2/0 CT1 27IN J339H BX/36

## (undated) DEVICE — COATED VICRYL  (POLYGLACTIN 910) SUTURE, VIOLET BRAIDED, STERILE, SYNTHETIC ABSORBABLE SUTURE: Brand: COATED VICRYL

## (undated) DEVICE — PK C/SECT 10

## (undated) DEVICE — SUT GUT CHRM 1 CTX 36IN 905H

## (undated) DEVICE — GLV SURG BIOGEL LTX PF 6 1/2

## (undated) DEVICE — MAT PREVALON MOBL TRANSFR AIR WO/PAD 39X80IN

## (undated) DEVICE — SUT PLAIN  3/0 CT1 27IN 842H

## (undated) DEVICE — SOL IRR NACL 0.9PCT BT 1000ML

## (undated) DEVICE — SOL IRR H2O BTL 1000ML STRL

## (undated) DEVICE — PROXIMATE RH ROTATING HEAD SKIN STAPLERS (35 WIDE) CONTAINS 35 STAINLESS STEEL STAPLES: Brand: PROXIMATE

## (undated) DEVICE — STPLR SKIN SUBCUTICULAR INSORB 2030